# Patient Record
Sex: FEMALE | Race: BLACK OR AFRICAN AMERICAN | NOT HISPANIC OR LATINO | URBAN - METROPOLITAN AREA
[De-identification: names, ages, dates, MRNs, and addresses within clinical notes are randomized per-mention and may not be internally consistent; named-entity substitution may affect disease eponyms.]

---

## 2015-03-23 RX ORDER — LEVOTHYROXINE SODIUM 125 MCG
1 TABLET ORAL
Qty: 0 | Refills: 0 | DISCHARGE
Start: 2015-03-23

## 2020-02-17 ENCOUNTER — INPATIENT (INPATIENT)
Facility: HOSPITAL | Age: 85
LOS: 8 days | Discharge: HOME | DRG: 385 | End: 2020-02-26
Attending: INTERNAL MEDICINE | Admitting: INTERNAL MEDICINE
Payer: MEDICARE

## 2020-02-17 VITALS
TEMPERATURE: 97 F | DIASTOLIC BLOOD PRESSURE: 83 MMHG | HEART RATE: 61 BPM | OXYGEN SATURATION: 96 % | HEIGHT: 60 IN | WEIGHT: 164.91 LBS | RESPIRATION RATE: 18 BRPM | SYSTOLIC BLOOD PRESSURE: 158 MMHG

## 2020-02-17 DIAGNOSIS — K92.2 GASTROINTESTINAL HEMORRHAGE, UNSPECIFIED: ICD-10-CM

## 2020-02-17 LAB
ALBUMIN SERPL ELPH-MCNC: 3.1 G/DL — LOW (ref 3.3–5)
ALP SERPL-CCNC: 70 U/L — SIGNIFICANT CHANGE UP (ref 40–120)
ALT FLD-CCNC: 11 U/L — SIGNIFICANT CHANGE UP (ref 10–45)
ANION GAP SERPL CALC-SCNC: 11 MMOL/L — SIGNIFICANT CHANGE UP (ref 5–17)
APTT BLD: 30.6 SEC — SIGNIFICANT CHANGE UP (ref 27.5–36.3)
AST SERPL-CCNC: 16 U/L — SIGNIFICANT CHANGE UP (ref 10–40)
BASOPHILS # BLD AUTO: 0.05 K/UL — SIGNIFICANT CHANGE UP (ref 0–0.2)
BASOPHILS NFR BLD AUTO: 0.6 % — SIGNIFICANT CHANGE UP (ref 0–2)
BILIRUB SERPL-MCNC: 0.2 MG/DL — SIGNIFICANT CHANGE UP (ref 0.2–1.2)
BLD GP AB SCN SERPL QL: NEGATIVE — SIGNIFICANT CHANGE UP
BUN SERPL-MCNC: 31 MG/DL — HIGH (ref 7–23)
C DIFF GDH STL QL: NEGATIVE — SIGNIFICANT CHANGE UP
C DIFF GDH STL QL: SIGNIFICANT CHANGE UP
CALCIUM SERPL-MCNC: 9 MG/DL — SIGNIFICANT CHANGE UP (ref 8.4–10.5)
CHLORIDE SERPL-SCNC: 103 MMOL/L — SIGNIFICANT CHANGE UP (ref 96–108)
CO2 SERPL-SCNC: 22 MMOL/L — SIGNIFICANT CHANGE UP (ref 22–31)
CREAT SERPL-MCNC: 1.25 MG/DL — SIGNIFICANT CHANGE UP (ref 0.5–1.3)
EOSINOPHIL # BLD AUTO: 0.08 K/UL — SIGNIFICANT CHANGE UP (ref 0–0.5)
EOSINOPHIL NFR BLD AUTO: 0.9 % — SIGNIFICANT CHANGE UP (ref 0–6)
GAS PNL BLDV: SIGNIFICANT CHANGE UP
GLUCOSE BLDC GLUCOMTR-MCNC: 159 MG/DL — HIGH (ref 70–99)
GLUCOSE BLDC GLUCOMTR-MCNC: 182 MG/DL — HIGH (ref 70–99)
GLUCOSE BLDC GLUCOMTR-MCNC: 202 MG/DL — HIGH (ref 70–99)
GLUCOSE SERPL-MCNC: 195 MG/DL — HIGH (ref 70–99)
HCT VFR BLD CALC: 21.3 % — LOW (ref 34.5–45)
HCT VFR BLD CALC: 28 % — LOW (ref 34.5–45)
HGB BLD-MCNC: 6.9 G/DL — CRITICAL LOW (ref 11.5–15.5)
HGB BLD-MCNC: 8.5 G/DL — LOW (ref 11.5–15.5)
IMM GRANULOCYTES NFR BLD AUTO: 0.5 % — SIGNIFICANT CHANGE UP (ref 0–1.5)
INR BLD: 1.01 RATIO — SIGNIFICANT CHANGE UP (ref 0.88–1.16)
LYMPHOCYTES # BLD AUTO: 1.78 K/UL — SIGNIFICANT CHANGE UP (ref 1–3.3)
LYMPHOCYTES # BLD AUTO: 20.8 % — SIGNIFICANT CHANGE UP (ref 13–44)
MCHC RBC-ENTMCNC: 25.4 PG — LOW (ref 27–34)
MCHC RBC-ENTMCNC: 26.3 PG — LOW (ref 27–34)
MCHC RBC-ENTMCNC: 30.4 GM/DL — LOW (ref 32–36)
MCHC RBC-ENTMCNC: 32.4 GM/DL — SIGNIFICANT CHANGE UP (ref 32–36)
MCV RBC AUTO: 81.3 FL — SIGNIFICANT CHANGE UP (ref 80–100)
MCV RBC AUTO: 83.8 FL — SIGNIFICANT CHANGE UP (ref 80–100)
MONOCYTES # BLD AUTO: 0.58 K/UL — SIGNIFICANT CHANGE UP (ref 0–0.9)
MONOCYTES NFR BLD AUTO: 6.8 % — SIGNIFICANT CHANGE UP (ref 2–14)
NEUTROPHILS # BLD AUTO: 6.01 K/UL — SIGNIFICANT CHANGE UP (ref 1.8–7.4)
NEUTROPHILS NFR BLD AUTO: 70.4 % — SIGNIFICANT CHANGE UP (ref 43–77)
NRBC # BLD: 0 /100 WBCS — SIGNIFICANT CHANGE UP (ref 0–0)
NRBC # BLD: 0 /100 WBCS — SIGNIFICANT CHANGE UP (ref 0–0)
PLATELET # BLD AUTO: 229 K/UL — SIGNIFICANT CHANGE UP (ref 150–400)
PLATELET # BLD AUTO: 250 K/UL — SIGNIFICANT CHANGE UP (ref 150–400)
POTASSIUM SERPL-MCNC: 4.7 MMOL/L — SIGNIFICANT CHANGE UP (ref 3.5–5.3)
POTASSIUM SERPL-SCNC: 4.7 MMOL/L — SIGNIFICANT CHANGE UP (ref 3.5–5.3)
PROT SERPL-MCNC: 6.5 G/DL — SIGNIFICANT CHANGE UP (ref 6–8.3)
PROTHROM AB SERPL-ACNC: 11.5 SEC — SIGNIFICANT CHANGE UP (ref 10–12.9)
RBC # BLD: 2.62 M/UL — LOW (ref 3.8–5.2)
RBC # BLD: 3.34 M/UL — LOW (ref 3.8–5.2)
RBC # FLD: 15.4 % — HIGH (ref 10.3–14.5)
RBC # FLD: 15.5 % — HIGH (ref 10.3–14.5)
RH IG SCN BLD-IMP: POSITIVE — SIGNIFICANT CHANGE UP
SODIUM SERPL-SCNC: 136 MMOL/L — SIGNIFICANT CHANGE UP (ref 135–145)
WBC # BLD: 11.27 K/UL — HIGH (ref 3.8–10.5)
WBC # BLD: 8.54 K/UL — SIGNIFICANT CHANGE UP (ref 3.8–10.5)
WBC # FLD AUTO: 11.27 K/UL — HIGH (ref 3.8–10.5)
WBC # FLD AUTO: 8.54 K/UL — SIGNIFICANT CHANGE UP (ref 3.8–10.5)

## 2020-02-17 PROCEDURE — 74177 CT ABD & PELVIS W/CONTRAST: CPT | Mod: 26

## 2020-02-17 PROCEDURE — 99285 EMERGENCY DEPT VISIT HI MDM: CPT | Mod: GC

## 2020-02-17 PROCEDURE — 93010 ELECTROCARDIOGRAM REPORT: CPT

## 2020-02-17 RX ORDER — DEXTROSE 50 % IN WATER 50 %
12.5 SYRINGE (ML) INTRAVENOUS ONCE
Refills: 0 | Status: DISCONTINUED | OUTPATIENT
Start: 2020-02-17 | End: 2020-02-26

## 2020-02-17 RX ORDER — ATORVASTATIN CALCIUM 80 MG/1
40 TABLET, FILM COATED ORAL AT BEDTIME
Refills: 0 | Status: DISCONTINUED | OUTPATIENT
Start: 2020-02-17 | End: 2020-02-26

## 2020-02-17 RX ORDER — SODIUM CHLORIDE 9 MG/ML
1000 INJECTION INTRAMUSCULAR; INTRAVENOUS; SUBCUTANEOUS
Refills: 0 | Status: DISCONTINUED | OUTPATIENT
Start: 2020-02-17 | End: 2020-02-18

## 2020-02-17 RX ORDER — QUETIAPINE FUMARATE 200 MG/1
100 TABLET, FILM COATED ORAL AT BEDTIME
Refills: 0 | Status: DISCONTINUED | OUTPATIENT
Start: 2020-02-17 | End: 2020-02-26

## 2020-02-17 RX ORDER — BUDESONIDE AND FORMOTEROL FUMARATE DIHYDRATE 160; 4.5 UG/1; UG/1
2 AEROSOL RESPIRATORY (INHALATION)
Refills: 0 | Status: DISCONTINUED | OUTPATIENT
Start: 2020-02-17 | End: 2020-02-26

## 2020-02-17 RX ORDER — DONEPEZIL HYDROCHLORIDE 10 MG/1
5 TABLET, FILM COATED ORAL AT BEDTIME
Refills: 0 | Status: DISCONTINUED | OUTPATIENT
Start: 2020-02-17 | End: 2020-02-26

## 2020-02-17 RX ORDER — ALBUTEROL 90 UG/1
2 AEROSOL, METERED ORAL EVERY 6 HOURS
Refills: 0 | Status: DISCONTINUED | OUTPATIENT
Start: 2020-02-17 | End: 2020-02-26

## 2020-02-17 RX ORDER — INSULIN LISPRO 100/ML
VIAL (ML) SUBCUTANEOUS
Refills: 0 | Status: DISCONTINUED | OUTPATIENT
Start: 2020-02-17 | End: 2020-02-26

## 2020-02-17 RX ORDER — PANTOPRAZOLE SODIUM 20 MG/1
40 TABLET, DELAYED RELEASE ORAL DAILY
Refills: 0 | Status: DISCONTINUED | OUTPATIENT
Start: 2020-02-17 | End: 2020-02-23

## 2020-02-17 RX ORDER — FUROSEMIDE 40 MG
0 TABLET ORAL
Qty: 0 | Refills: 0 | DISCHARGE

## 2020-02-17 RX ORDER — SODIUM CHLORIDE 9 MG/ML
1000 INJECTION, SOLUTION INTRAVENOUS
Refills: 0 | Status: DISCONTINUED | OUTPATIENT
Start: 2020-02-17 | End: 2020-02-19

## 2020-02-17 RX ORDER — GLUCAGON INJECTION, SOLUTION 0.5 MG/.1ML
1 INJECTION, SOLUTION SUBCUTANEOUS ONCE
Refills: 0 | Status: DISCONTINUED | OUTPATIENT
Start: 2020-02-17 | End: 2020-02-26

## 2020-02-17 RX ORDER — DEXTROSE 50 % IN WATER 50 %
25 SYRINGE (ML) INTRAVENOUS ONCE
Refills: 0 | Status: DISCONTINUED | OUTPATIENT
Start: 2020-02-17 | End: 2020-02-26

## 2020-02-17 RX ORDER — LEVOTHYROXINE SODIUM 125 MCG
125 TABLET ORAL DAILY
Refills: 0 | Status: DISCONTINUED | OUTPATIENT
Start: 2020-02-17 | End: 2020-02-26

## 2020-02-17 RX ORDER — DEXTROSE 50 % IN WATER 50 %
15 SYRINGE (ML) INTRAVENOUS ONCE
Refills: 0 | Status: DISCONTINUED | OUTPATIENT
Start: 2020-02-17 | End: 2020-02-26

## 2020-02-17 RX ORDER — PANTOPRAZOLE SODIUM 20 MG/1
40 TABLET, DELAYED RELEASE ORAL ONCE
Refills: 0 | Status: COMPLETED | OUTPATIENT
Start: 2020-02-17 | End: 2020-02-17

## 2020-02-17 RX ORDER — INSULIN LISPRO 100/ML
VIAL (ML) SUBCUTANEOUS AT BEDTIME
Refills: 0 | Status: DISCONTINUED | OUTPATIENT
Start: 2020-02-17 | End: 2020-02-26

## 2020-02-17 RX ADMIN — QUETIAPINE FUMARATE 100 MILLIGRAM(S): 200 TABLET, FILM COATED ORAL at 23:38

## 2020-02-17 RX ADMIN — DONEPEZIL HYDROCHLORIDE 5 MILLIGRAM(S): 10 TABLET, FILM COATED ORAL at 23:38

## 2020-02-17 RX ADMIN — Medication 1: at 13:17

## 2020-02-17 RX ADMIN — Medication 2: at 17:47

## 2020-02-17 RX ADMIN — ATORVASTATIN CALCIUM 40 MILLIGRAM(S): 80 TABLET, FILM COATED ORAL at 23:38

## 2020-02-17 RX ADMIN — PANTOPRAZOLE SODIUM 40 MILLIGRAM(S): 20 TABLET, DELAYED RELEASE ORAL at 23:39

## 2020-02-17 RX ADMIN — PANTOPRAZOLE SODIUM 40 MILLIGRAM(S): 20 TABLET, DELAYED RELEASE ORAL at 10:00

## 2020-02-17 NOTE — CONSULT NOTE ADULT - SUBJECTIVE AND OBJECTIVE BOX
Patient is a 87y old  Female who presents with a chief complaint of     HPI: Ms. Alejandra is an 86 yo lady with PMH of  breast CA, HTN, COPD, T2DM, diastolic CHF, hypothyroidism, p/w 1 episode of BRBPR; daughter notes she was helping patient clean up around 2h PTA and noted blood with clots on toilet paper and a small amount in the toilet bowl. She had 2 more episodes while she was in ER with blood mixed with stool. She now is having more of clots coming out. Patient is unable to provide history due to her baseline dementia No fever or chills were reported. No abdominal pain.    Last Colonoscopy: 2015 The splenic flexure, cecum, at the appendiceal orifice and ileocecal valve are normal. - Findings suggestive of ischemic colitis at the hepatic flexure. Biopsied.   Clip was placed.- Diverticulosis in the sigmoid colon, in the descending colon, in the transverse colon and in the ascending colon.    Last Endoscopy: 2015  Normal esophagus.  - Hiatus hernia.- Gastric mucosal atrophy.- Normal duodenal bulb. - Non-bleeding ampullary polypoid mass.- Normal 2nd part of the duodenum. Nephrology consulted for CKD stage 3.           PAST MEDICAL & SURGICAL HISTORY:  Chronic Diastolic Heart Failure  Chronic Obstructive Pulmonary Disease (COPD)  Depression  Dementia  Diabetes Mellitus Type II  History of Hypothyroidism  Hypertension  S/P total B/LKnee Replacement      MEDICATIONS  (STANDING):  dextrose 5%. 1000 milliLiter(s) (50 mL/Hr) IV Continuous <Continuous>  dextrose 50% Injectable 12.5 Gram(s) IV Push once  dextrose 50% Injectable 25 Gram(s) IV Push once  dextrose 50% Injectable 25 Gram(s) IV Push once  insulin lispro (HumaLOG) corrective regimen sliding scale   SubCutaneous three times a day before meals  insulin lispro (HumaLOG) corrective regimen sliding scale   SubCutaneous at bedtime      Allergies  Vasotec (Unknown)    SOCIAL HISTORY:  Denies alcohol or tobacco abuse.    FAMILY HISTORY:  No pertinent family history in first degree relatives  No kidney disease in family.    REVIEW OF SYSTEMS:  Unable to obtain as she is debted.     VITAL:  T(C): , Max: 36.8 (02-17-20 @ 08:30)  T(F): , Max: 98.3 (02-17-20 @ 08:30)  HR: 76 (02-17-20 @ 20:02)  BP: 147/86 (02-17-20 @ 20:02)  RR: 20 (02-17-20 @ 20:02)  SpO2: 96% (02-17-20 @ 20:02)      PHYSICAL EXAM:  General: NAD, Alert, Pleasant  HEENT: NCAT, PERRLA  Neck: Supple, No JVD  Respiratory: CTA-b/l  Cardiovascular: RRR s1s2, no m/r/g  Gastrointestinal: +BS, soft, NT/ND  Extremities: No peripheral edema b/l  Neurological: no focal deficits; strength grossly intact  Psychiatric: Normal mood, normal affect  Back: no CVAT b/l  Skin: No rashes, no nevi      02-17    136  |  103  |  31<H>  ----------------------------<  195<H>  4.7   |  22  |  1.25    Ca    9.0      17 Feb 2020 08:44    TPro  6.5  /  Alb  3.1<L>  /  TBili  0.2  /  DBili  x   /  AST  16  /  ALT  11  /  AlkPhos  70  02-17  LABS:                        6.9    11.27 )-----------( 229      ( 17 Feb 2020 21:10 )             21.3     Na(136)/K(4.7)/Cl(103)/HCO3(22)/BUN(31)/Cr(1.25)Glu(195)/Ca(9.0)/Mg(--)/PO4(--)    02-17 @ 08:44      IMAGING:  EXAM:  CT ABDOMEN AND PELVIS IC                            PROCEDURE DATE:  02/17/2020            INTERPRETATION:  CLINICAL INFORMATION: Left lower quadrant abdominal pain.     COMPARISON: CT abdomen and pelvis 6/11/2015.    PROCEDURE:   CT of the Abdomen and Pelvis was performed with intravenous contrast.   Intravenous contrast: 90 ml Omnipaque 350. 10 ml discarded.  Oral contrast: None.  Sagittal and coronal reformats were performed.    FINDINGS:    LOWER CHEST: Cardiomegaly.    LIVER: Within normal limits.  BILE DUCTS: Normal caliber.  GALLBLADDER: Within normal limits.  SPLEEN: Within normal limits.  PANCREAS: Within normal limits.  ADRENALS: Within normal limits.  KIDNEYS/URETERS: Bilateral subcentimeter hypodense foci, too small to characterize. No hydronephrosis.    BLADDER: Diffuse wall thickening.  REPRODUCTIVE ORGANS: Uterus and adnexa within normal limits.    BOWEL: Small hiatal hernia. No bowel obstruction. Appendix is not visualized. Colonic diverticulosis without diverticulitis. Mild colonic wall thickening involving the proximal sigmoid and descending colon.  PERITONEUM: No drainable fluid collection.  VESSELS: Atherosclerotic changes.  RETROPERITONEUM/LYMPH NODES: No lymphadenopathy.    ABDOMINAL WALL: Small fat-containing umbilical hernia.  BONES: Unchanged sclerotic focus in the L1 vertebral body. Degenerative changes.    IMPRESSION:     Longer segment wall thickening involving the proximal sigmoid and descending colon favoring colitis.    Extensive diverticular disease.    Diffuse bladder wall thickening; correlate with urinalysis for underlying cystitis.

## 2020-02-17 NOTE — CONSULT NOTE ADULT - SUBJECTIVE AND OBJECTIVE BOX
Chief Complaint:  bright red blood per rectum     HPI:  86 yo hx of breast CA, HTN, COPD, T2DM, diastolic CHF, hypothyroidism, p/w 1 episode of BRBPR; daughter notes she was helping patient clean up around 2h PTA and noted blood with clots on toilet paper and a small amount in the toilet bowl. She had 2 more episodes while she was in ER with blood mixed with stool.   Patient is unable to provide history due to her baseline dementia   No fever or chills were reported. No abdominal pain.    Last Colonoscopy: 2015 The splenic flexure, cecum, at the appendiceal orifice and ileocecal valve are normal. - Findings suggestive of ischemic colitis at the hepatic flexure. Biopsied.   Clip was placed.- Diverticulosis in the sigmoid colon, in the descending colon, in the transverse colon and in the ascending colon.    Last Endoscopy: 2015  Normal esophagus.  - Hiatus hernia.- Gastric mucosal atrophy.- Normal duodenal bulb. - Non-bleeding ampullary polypoid mass.- Normal 2nd part of the duodenum.    Allergies:  Vasotec (Unknown)    Home Medications:   * Patient Currently Takes Medications as of 11-Jul-2015 15:38 documented in Structured Notes  · 	metoprolol tartrate 25 mg oral tablet: 1 tab(s) orally 2 times a day  · 	pantoprazole 40 mg oral delayed release tablet: 1 tab(s) orally 2 times a day (before meals)  · 	hydrALAZINE 50 mg oral tablet: 1 tab(s) orally 3 times a day  · 	albuterol-ipratropium 2.5 mg-0.5 mg/3 mL inhalation solution: 3 milliliter(s) inhaled every 6 hours  · 	levothyroxine 125 mcg (0.125 mg) oral tablet: 1 tab(s) orally once a day  · 	fluticasone-salmeterol 250 mcg-50 mcg inhalation powder: 1 puff(s) inhaled 2 times a day  · 	metFORMIN 500 mg oral tablet: 1 tab(s) orally 2 times a day  · 	simvastatin 80 mg oral tablet: 1 tab(s) orally once a day (at bedtime)  · 	donepezil 5 mg oral tablet: 1 tab(s) orally once a day (at bedtime)    Hospital Medications:  dextrose 40% Gel 15 Gram(s) Oral once PRN  dextrose 5%. 1000 milliLiter(s) IV Continuous <Continuous>  dextrose 50% Injectable 12.5 Gram(s) IV Push once  dextrose 50% Injectable 25 Gram(s) IV Push once  dextrose 50% Injectable 25 Gram(s) IV Push once  glucagon  Injectable 1 milliGRAM(s) IntraMuscular once PRN  insulin lispro (HumaLOG) corrective regimen sliding scale   SubCutaneous three times a day before meals  insulin lispro (HumaLOG) corrective regimen sliding scale   SubCutaneous at bedtime      PMHX/PSHX:  Chronic Diastolic Heart Failure  Chronic Obstructive Pulmonary Disease (COPD)  Depression  Dementia  Diabetes Mellitus Type II  History of Hypothyroidism  Hypertension  S/P total B/LKnee Replacement      Family history:  unable to be obtained due to her baseline dementia       Social History: no smoking    ROS:  unable to be obtained due to her baseline dementia       PHYSICAL EXAM:   GENERAL:  NAD, Appears stated age  HEENT:  NC/AT,  conjunctivae clear and pink, sclera -anicteric  CHEST:  CTA B/L, Normal effort  HEART:  RRR S1/S2, No murmurs  ABDOMEN:  Soft, non-tender, non-distended, normoactive bowel sounds,  no masses   EXTREMITIES:  Edema  SKIN:  Warm & Dry. No rash or erythema  NEURO:  Alert, oriented x 1     Vital Signs:  Vital Signs Last 24 Hrs  T(C): 36.7 (17 Feb 2020 20:02), Max: 36.8 (17 Feb 2020 08:30)  T(F): 98 (17 Feb 2020 20:02), Max: 98.3 (17 Feb 2020 08:30)  HR: 76 (17 Feb 2020 20:02) (61 - 76)  BP: 147/86 (17 Feb 2020 20:02) (121/59 - 158/83)  BP(mean): --  RR: 20 (17 Feb 2020 20:02) (16 - 21)  SpO2: 96% (17 Feb 2020 20:02) (96% - 100%)  Daily Height in cm: 152.4 (17 Feb 2020 20:02)    Daily     LABS:                        8.5    8.54  )-----------( 250      ( 17 Feb 2020 08:44 )             28.0     Mean Cell Volume: 83.8 fl (02-17-20 @ 08:44)    02-17    136  |  103  |  31<H>  ----------------------------<  195<H>  4.7   |  22  |  1.25    Ca    9.0      17 Feb 2020 08:44    TPro  6.5  /  Alb  3.1<L>  /  TBili  0.2  /  DBili  x   /  AST  16  /  ALT  11  /  AlkPhos  70  02-17    LIVER FUNCTIONS - ( 17 Feb 2020 08:44 )  Alb: 3.1 g/dL / Pro: 6.5 g/dL / ALK PHOS: 70 U/L / ALT: 11 U/L / AST: 16 U/L / GGT: x           PT/INR - ( 17 Feb 2020 08:44 )   PT: 11.5 sec;   INR: 1.01 ratio         PTT - ( 17 Feb 2020 08:44 )  PTT:30.6 sec                            8.5    8.54  )-----------( 250      ( 17 Feb 2020 08:44 )             28.0     Imaging:  < from: CT Abdomen and Pelvis w/ IV Cont (02.17.20 @ 10:16) >  EXAM:  CT ABDOMEN AND PELVIS IC                            PROCEDURE DATE:  02/17/2020            INTERPRETATION:  CLINICAL INFORMATION: Left lower quadrant abdominal pain.     COMPARISON: CT abdomen and pelvis 6/11/2015.    PROCEDURE:   CT of the Abdomen and Pelvis was performed with intravenous contrast.   Intravenous contrast: 90 ml Omnipaque 350. 10 ml discarded.  Oral contrast: None.  Sagittal and coronal reformats were performed.    FINDINGS:    LOWER CHEST: Cardiomegaly.    LIVER: Withinnormal limits.  BILE DUCTS: Normal caliber.  GALLBLADDER: Within normal limits.  SPLEEN: Within normal limits.  PANCREAS: Within normal limits.  ADRENALS: Within normal limits.  KIDNEYS/URETERS: Bilateral subcentimeter hypodense foci, too small to characterize. No hydronephrosis.    BLADDER: Diffuse wall thickening.  REPRODUCTIVE ORGANS: Uterus and adnexa within normal limits.    BOWEL: Small hiatal hernia. No bowel obstruction. Appendix is not visualized. Colonic diverticulosis without diverticulitis. Mild colonic wall thickening involving the proximal sigmoid and descending colon.  PERITONEUM: No drainable fluid collection.  VESSELS: Atherosclerotic changes.  RETROPERITONEUM/LYMPH NODES: No lymphadenopathy.    ABDOMINAL WALL: Small fat-containing umbilical hernia.  BONES: Unchanged sclerotic focus in the L1 vertebral body. Degenerative changes.    IMPRESSION:     Longer segment wall thickening involving the proximal sigmoid and descending colon favoring colitis.    Extensive diverticular disease.    Diffuse bladder wall thickening; correlate with urinalysis for underlying cystitis.          < end of copied text >

## 2020-02-17 NOTE — ED PROVIDER NOTE - PROGRESS NOTE DETAILS
Spoke w/ Dr Huerta's office, he is not her GI only saw her once in the past. Spoke w/ [facility] will which see pt while admitted

## 2020-02-17 NOTE — ED PROVIDER NOTE - CLINICAL SUMMARY MEDICAL DECISION MAKING FREE TEXT BOX
Pt p/w painless rectal bleeding, not on AC, in NAD on exam. Likely LGIB from hemorrhoid vs diverticular bleeding vs external anal pathology such as tear or fissure. Plan: Labs, guaiac, dispo pending w/u

## 2020-02-17 NOTE — H&P ADULT - HISTORY OF PRESENT ILLNESS
88 yo hx of breast CA, dementia,  HTN, COPD, T2DM, diastolic CHF, hypothyroidism, developed BRBPR this am, daughter notes she was helping patient clean up around 6 am and noted blood with clots on toilet paper and a small amount in the toilet bowl. Daughter denies pt reporting CP, SOB, or dizziness. Ptn has baseline dementia, appears confortable and denies any discomfort. pale appearing, Ptn had LGI bleed 5 years ago but daughter unclear about the results.

## 2020-02-17 NOTE — H&P ADULT - NSICDXPASTMEDICALHX_GEN_ALL_CORE_FT
PAST MEDICAL HISTORY:  Chronic Diastolic Heart Failure     Chronic Obstructive Pulmonary Disease (COPD)     Dementia     Depression     Diabetes Mellitus Type II     History of Hypothyroidism     Hypertension

## 2020-02-17 NOTE — ED ADULT NURSE NOTE - OBJECTIVE STATEMENT
86 y/o F pt, w/ pmh of COPD, CHF, DM, dementia, depression, HTN, hypothyroidism, present to ED for bloody stool, as per daughter who is at bedside, pt had 1 episode of bright red blood with clots, on exam A&Ox2, breathing spontaneous, unlabored w/o distress on room air, mild abd pain to umbilical area, abd soft, NT, ND, + BS in all 4 quadrant, neg CVA tenderness, denies N/V/D, fever, chills, dysuria, hematuria, seen and eval by MD, heplock placed, labs drawn and sent, placed on CM NSR, clot was found in diaper, butt clean and dry with pigmentation changes to buttock, awaits CT scan

## 2020-02-17 NOTE — CONSULT NOTE ADULT - ASSESSMENT
ASSESSMENT:   Ms. Alejandra is an 86 yo lady with PMH of  breast CA, HTN, COPD, T2DM, diastolic CHF, hypothyroidism, presented with 1 episode of BRBPR; daughter notes she was helping patient clean up around 2h PTA and noted blood with clots on toilet paper and a small amount in the toilet bowl. She had 2 more episodes while she was in ER with blood mixed with stool. She now is having more of clots coming out. Patient is unable to provide history due to her baseline dementia No fever or chills were reported. No abdominal pain. Nephrology consulted for CKD stage 3.     1. CKD stage 3. Her Scr is 1.25 mg/dl.    2. Electrolytes are acceptable.   3. Intravascularly depleted.   4. Acute blood loss anemia.   5. Leukocytosis.   6. Diffuse wall thickening with possible cystitis.     RECOMMEND:  - UA, urine Na, urine creatinine, urine     Thank you for involving [employer] in this patient's care.    With warm regards,    Erika Valencia, DO  [email or web address]  [phone] ASSESSMENT:   Ms. Alejandra is an 88 yo lady with PMH of  breast CA, HTN, COPD, T2DM, diastolic CHF, hypothyroidism, presented with 1 episode of BRBPR; daughter notes she was helping patient clean up around 2h PTA and noted blood with clots on toilet paper and a small amount in the toilet bowl. She had 2 more episodes while she was in ER with blood mixed with stool. She now is having more of clots coming out. Patient is unable to provide history due to her baseline dementia No fever or chills were reported. No abdominal pain. Nephrology consulted for CKD stage 3.     1. CKD stage 3. Her Scr is 1.25 mg/dl.    2. Electrolytes are acceptable.   3. Intravascularly depleted.   4. Acute blood loss anemia.   5. Leukocytosis.   6. Diffuse wall thickening with possible cystitis.     RECOMMEND:  - UA, urine Na, urine creatinine, urine protein.   - No need for renal US.   - Urine culture.   - Avoid NSAIDs and nephrotoxins.   - Renal dose all medication for GFR <35 to 45 ml/min.     Thank you for involving [employer] in this patient's care.    With warm regards,    Erika Valencia, DO  [employer]  [phone] ASSESSMENT:   Ms. Alejandra is an 86 yo lady with PMH of  breast CA, HTN, COPD, T2DM, diastolic CHF, hypothyroidism, presented with 1 episode of BRBPR; daughter notes she was helping patient clean up around 2h PTA and noted blood with clots on toilet paper and a small amount in the toilet bowl. She had 2 more episodes while she was in ER with blood mixed with stool. She now is having more of clots coming out. Patient is unable to provide history due to her baseline dementia No fever or chills were reported. No abdominal pain. Nephrology consulted for CKD stage 3.     1. CKD stage 3. Her Scr is 1.25 mg/dl.    2. Electrolytes are acceptable.   3. Intravascularly depleted.   4. Acute blood loss anemia.   5. Leukocytosis.   6. Diffuse wall thickening with possible cystitis.     RECOMMEND:  - UA, urine Na, urine creatinine, urine protein.   - No need for renal US.   - Urine culture.   - IVF of NS at 50 cc/hr for 20 hours.   - Avoid NSAIDs and nephrotoxins.   - Renal dose all medication for GFR <35 to 45 ml/min.     Thank you for involving [employer] in this patient's care.    With warm regards,    Erika Valencia, DO  [employer]  [phone]

## 2020-02-17 NOTE — ED PROVIDER NOTE - OBJECTIVE STATEMENT
88 yo hx of breast CA, HTN, COPD, T2DM, diastolaic CHF, hypothyroidism, p/w 1 episode of BRBPR; daughter notes she was helping patient clean up around 2h PTA and noted blood with clots on toilet paper and a small amount in the toilet bowl. Daughter denies pt reporting CP, SoB, or light headedness, or being on blood thinners. Per daughter at bedside pt had similar bleeding 5 years ago and had a colonoscopy; is unsure of the results at that time and is unsure whether pt has a GI doctor.

## 2020-02-17 NOTE — ED ADULT NURSE REASSESSMENT NOTE - NS ED NURSE REASSESS COMMENT FT1
pt had 1 episode of bright red blood per rectum with multiple clots, pt is A&Ox3, breathing spontaneous, unlabored w/o distress on room air, CM NSR, denies dizziness, light headed or weakness, abd soft, ND, NT, MD Lata notified, 2 large bore IV placed, b/p 131/10, hr 74, will continue to monitor pt

## 2020-02-17 NOTE — H&P ADULT - ASSESSMENT
88 yo woman presents w large volume maroon bloody BMs w large amount of clots, HH is 8.5/28, need to trend every 6 hrs, transfuse w 2 Units PRBS if drops below 7, get CRS and GI consult, Ptn w tic and questionable colitis on Ct A/P. GI consult pending. will follow recommendations, check stool PCR/ get ID consult, get colonoscopy results from 2015.  keep on clears  IVF  hold all outptn antiHTN meds, hold outptn diuretic  transfuse to HH 10/30  check cbc q 6H  consult CRS  DVT ppx w PAS

## 2020-02-17 NOTE — CONSULT NOTE ADULT - ASSESSMENT
Impression:  # Hematochezia with long segment wall thickening involving the proximal sigmoid and descending colon favoring colitis and extensive diverticular disease.    DDx: diverticular bleed, ischemic or infectious colitis   # Dementia   # hx of breast CA  # diastolic CHF    Recommendations:  - Send stool for GI PCR, C. DIFF  - Clear liquid diet for now  - Will discuss with family regarding flex. Sigmoidoscopy   - NPO after midnight.  - Continue to monitor h&h and transfuse if hgb drop below 7

## 2020-02-17 NOTE — ED PROVIDER NOTE - ATTENDING CONTRIBUTION TO CARE
Attending MD Guido:  I personally have seen and examined this patient.  Resident note reviewed and agree on plan of care and except where noted.  See HPI, PE, and MDM for details.      86 yo hx of breast CA, HTN, COPD, T2DM, diastolaic CHF, hypothyroidism, p/w 1 episode of BRBPR, exam here with stable hemodynamics but rectal with clots and bright red blood, also with mild LLQ ttp so will obtain CT a/p to ro diverticulitis or colitis. Plan for labs, PIV access, T&S, admission

## 2020-02-17 NOTE — H&P ADULT - NSHPPHYSICALEXAM_GEN_ALL_CORE
T(F): 98 (02-17-20 @ 15:02), Max: 98.3 (02-17-20 @ 08:30)  HR: 76 (02-17-20 @ 15:02) (61 - 76)  BP: 147/86 (02-17-20 @ 15:02) (121/59 - 158/83)  RR: 20 (02-17-20 @ 15:02) (16 - 21)  SpO2: 96% (02-17-20 @ 15:02) (96% - 100%)    GENERAL: NAD, well-developed  HEAD:  Atraumatic, Normocephalic  EYES: EOMI, PERRLA, conjunctiva and sclera clear  NECK: Supple, No JVD  CHEST/LUNG: Clear to auscultation bilaterally; No wheeze  HEART: Regular rate and rhythm; No murmurs, rubs, or gallops  ABDOMEN: Soft, Nontender, Nondistended; Bowel sounds present  EXTREMITIES:  2+ Peripheral Pulses, No clubbing, cyanosis, or edema  PSYCH: AAOx3  NEUROLOGY: non-focal  SKIN: No rashes or lesions T(F): 98 (02-17-20 @ 15:02), Max: 98.3 (02-17-20 @ 08:30)  HR: 76 (02-17-20 @ 15:02) (61 - 76)  BP: 147/86 (02-17-20 @ 15:02) (121/59 - 158/83)  RR: 20 (02-17-20 @ 15:02) (16 - 21)  SpO2: 96% (02-17-20 @ 15:02) (96% - 100%)    GENERAL: NAD, well-developed  HEAD:  Atraumatic, Normocephalic  EYES: EOMI, PERRLA, conjunctiva pale and sclera clear  NECK: Supple, No JVD  CHEST/LUNG: Clear to auscultation bilaterally; No wheeze  HEART: Regular rate and rhythm; No murmurs, rubs, or gallops  ABDOMEN: Soft, Nontender, Nondistended; Bowel sounds present  EXTREMITIES:  2+ Peripheral Pulses, No clubbing, cyanosis, or edema  PSYCH: AAOx3  NEUROLOGY: non-focal  SKIN: No rashes or lesions

## 2020-02-17 NOTE — ED PROVIDER NOTE - PHYSICAL EXAMINATION
General: Alert. No apparent distress.  Head: Normocephalic and atraumatic.  Eyes: PERRLA with EOMI.  Neck: Supple. Trachea midline.   Cardiac: Normal S1 and S2 w/ RRR. No murmurs appreciated.   Pulmonary: Vesicular breath sounds bilaterally. No increased WOB. No wheezes or crackles.  Abdominal: Soft, non-tender.  Neurologic: No focal sensory or motor deficits.  Musculoskeletal: Strength appropriate in all 4 extremities for age with no limited ROM.  Skin: Color appropriate for race. Intact, warm, and well-perfused. General: Alert. No apparent distress.  Head: Normocephalic and atraumatic.  Eyes: PERRLA with EOMI.  Neck: Supple. Trachea midline.   Cardiac: Normal S1 and S2 w/ RRR. No murmurs appreciated.   Pulmonary: Vesicular breath sounds bilaterally. No increased WOB. No wheezes or crackles.  Abdominal: Soft, non-tender. Gross blood and dark stool in the vault. Clemenet Chavez RN  Neurologic: No focal sensory or motor deficits.  Musculoskeletal: Strength appropriate in all 4 extremities for age with no limited ROM.  Skin: Color appropriate for race. Intact, warm, and well-perfused. General: Alert. No apparent distress.  Head: Normocephalic and atraumatic.  Eyes: PERRLA with EOMI.  Neck: Supple. Trachea midline.   Cardiac: Normal S1 and S2 w/ RRR. No murmurs appreciated.   Pulmonary: Vesicular breath sounds bilaterally. No increased WOB. No wheezes or crackles.  Abdominal: Soft, mild LLQ TTP. Gross blood and dark stool in the vault. Clemente Chavez RN  Neurologic: No focal sensory or motor deficits.  Musculoskeletal: Strength appropriate in all 4 extremities for age with no limited ROM.  Skin: Color appropriate for race. Intact, warm, and well-perfused.

## 2020-02-17 NOTE — ED ADULT NURSE NOTE - NSIMPLEMENTINTERV_GEN_ALL_ED
Implemented All Fall Risk Interventions:  [place] to call system. Call bell, personal items and telephone within reach. Instruct patient to call for assistance. Room bathroom lighting operational. Non-slip footwear when patient is off stretcher. Physically safe environment: no spills, clutter or unnecessary equipment. Stretcher in lowest position, wheels locked, appropriate side rails in place. Provide visual cue, wrist band, yellow gown, etc. Monitor gait and stability. Monitor for mental status changes and reorient to person, place, and time. Review medications for side effects contributing to fall risk. Reinforce activity limits and safety measures with patient and family.

## 2020-02-18 LAB
ANION GAP SERPL CALC-SCNC: 12 MMOL/L — SIGNIFICANT CHANGE UP (ref 5–17)
BUN SERPL-MCNC: 33 MG/DL — HIGH (ref 7–23)
CALCIUM SERPL-MCNC: 8.8 MG/DL — SIGNIFICANT CHANGE UP (ref 8.4–10.5)
CHLORIDE SERPL-SCNC: 104 MMOL/L — SIGNIFICANT CHANGE UP (ref 96–108)
CO2 SERPL-SCNC: 22 MMOL/L — SIGNIFICANT CHANGE UP (ref 22–31)
CREAT ?TM UR-MCNC: 48 MG/DL — SIGNIFICANT CHANGE UP
CREAT SERPL-MCNC: 1.48 MG/DL — HIGH (ref 0.5–1.3)
CULTURE RESULTS: SIGNIFICANT CHANGE UP
GLUCOSE BLDC GLUCOMTR-MCNC: 110 MG/DL — HIGH (ref 70–99)
GLUCOSE BLDC GLUCOMTR-MCNC: 123 MG/DL — HIGH (ref 70–99)
GLUCOSE BLDC GLUCOMTR-MCNC: 178 MG/DL — HIGH (ref 70–99)
GLUCOSE BLDC GLUCOMTR-MCNC: 202 MG/DL — HIGH (ref 70–99)
GLUCOSE SERPL-MCNC: 160 MG/DL — HIGH (ref 70–99)
HBA1C BLD-MCNC: 7.2 % — HIGH (ref 4–5.6)
HCT VFR BLD CALC: 25.4 % — LOW (ref 34.5–45)
HGB BLD-MCNC: 7.8 G/DL — LOW (ref 11.5–15.5)
MAGNESIUM SERPL-MCNC: 1.9 MG/DL — SIGNIFICANT CHANGE UP (ref 1.6–2.6)
MCHC RBC-ENTMCNC: 26.6 PG — LOW (ref 27–34)
MCHC RBC-ENTMCNC: 30.7 GM/DL — LOW (ref 32–36)
MCV RBC AUTO: 86.7 FL — SIGNIFICANT CHANGE UP (ref 80–100)
NRBC # BLD: 0 /100 WBCS — SIGNIFICANT CHANGE UP (ref 0–0)
PHOSPHATE SERPL-MCNC: 3.4 MG/DL — SIGNIFICANT CHANGE UP (ref 2.5–4.5)
PLATELET # BLD AUTO: 136 K/UL — LOW (ref 150–400)
POTASSIUM SERPL-MCNC: 4.2 MMOL/L — SIGNIFICANT CHANGE UP (ref 3.5–5.3)
POTASSIUM SERPL-SCNC: 4.2 MMOL/L — SIGNIFICANT CHANGE UP (ref 3.5–5.3)
PROT ?TM UR-MCNC: 32 MG/DL — HIGH (ref 0–12)
PROT/CREAT UR-RTO: 0.7 RATIO — HIGH (ref 0–0.2)
RBC # BLD: 2.93 M/UL — LOW (ref 3.8–5.2)
RBC # FLD: 15.5 % — HIGH (ref 10.3–14.5)
SODIUM SERPL-SCNC: 138 MMOL/L — SIGNIFICANT CHANGE UP (ref 135–145)
SODIUM UR-SCNC: 49 MMOL/L — SIGNIFICANT CHANGE UP
SPECIMEN SOURCE: SIGNIFICANT CHANGE UP
WBC # BLD: 10.38 K/UL — SIGNIFICANT CHANGE UP (ref 3.8–10.5)
WBC # FLD AUTO: 10.38 K/UL — SIGNIFICANT CHANGE UP (ref 3.8–10.5)

## 2020-02-18 PROCEDURE — 99222 1ST HOSP IP/OBS MODERATE 55: CPT

## 2020-02-18 RX ORDER — SODIUM CHLORIDE 9 MG/ML
1000 INJECTION INTRAMUSCULAR; INTRAVENOUS; SUBCUTANEOUS
Refills: 0 | Status: DISCONTINUED | OUTPATIENT
Start: 2020-02-18 | End: 2020-02-19

## 2020-02-18 RX ADMIN — DONEPEZIL HYDROCHLORIDE 5 MILLIGRAM(S): 10 TABLET, FILM COATED ORAL at 21:52

## 2020-02-18 RX ADMIN — PANTOPRAZOLE SODIUM 40 MILLIGRAM(S): 20 TABLET, DELAYED RELEASE ORAL at 12:54

## 2020-02-18 RX ADMIN — SODIUM CHLORIDE 50 MILLILITER(S): 9 INJECTION INTRAMUSCULAR; INTRAVENOUS; SUBCUTANEOUS at 00:00

## 2020-02-18 RX ADMIN — Medication 30 MILLIGRAM(S): at 12:54

## 2020-02-18 RX ADMIN — ATORVASTATIN CALCIUM 40 MILLIGRAM(S): 80 TABLET, FILM COATED ORAL at 21:52

## 2020-02-18 RX ADMIN — BUDESONIDE AND FORMOTEROL FUMARATE DIHYDRATE 2 PUFF(S): 160; 4.5 AEROSOL RESPIRATORY (INHALATION) at 06:04

## 2020-02-18 RX ADMIN — Medication 2: at 08:50

## 2020-02-18 RX ADMIN — QUETIAPINE FUMARATE 100 MILLIGRAM(S): 200 TABLET, FILM COATED ORAL at 21:52

## 2020-02-18 RX ADMIN — Medication 125 MICROGRAM(S): at 06:04

## 2020-02-18 RX ADMIN — SODIUM CHLORIDE 50 MILLILITER(S): 9 INJECTION INTRAMUSCULAR; INTRAVENOUS; SUBCUTANEOUS at 17:31

## 2020-02-18 RX ADMIN — BUDESONIDE AND FORMOTEROL FUMARATE DIHYDRATE 2 PUFF(S): 160; 4.5 AEROSOL RESPIRATORY (INHALATION) at 17:31

## 2020-02-18 NOTE — CONSULT NOTE ADULT - ASSESSMENT
GIB  fu with GI   likely diverticulosis   Monitor hemoglobin, transfuse as needed.     HTN  stable for now  if persistently elevated , we can resume slowly her outpt meds    Diastolic CHF   chronic   stable   diuretics on hold for now given active GIB    CKD  pt follow up with renal   stable

## 2020-02-18 NOTE — CONSULT NOTE ADULT - SUBJECTIVE AND OBJECTIVE BOX
CHIEF COMPLAINT:Patient is a 87y old  Female who presents with a chief complaint of GI bleed. (17 Feb 2020 19:27)      HISTORY OF PRESENT ILLNESS:    87 female with history as below, known to me from office with history of  breast CA, HTN, COPD, T2DM, diastolic CHF, hypothyroidism, p/w  GIB   bright red blood per rectum   no cp or sob  pt is mildly lethargic on exam today     PAST MEDICAL & SURGICAL HISTORY:  Chronic Diastolic Heart Failure  Chronic Obstructive Pulmonary Disease (COPD)  Depression  Dementia  Diabetes Mellitus Type II  History of Hypothyroidism  Hypertension  S/P total B/LKnee Replacement          MEDICATIONS:      ALBUTerol    90 MICROgram(s) HFA Inhaler 2 Puff(s) Inhalation every 6 hours PRN  budesonide 160 MICROgram(s)/formoterol 4.5 MICROgram(s) Inhaler 2 Puff(s) Inhalation two times a day    donepezil 5 milliGRAM(s) Oral at bedtime  PARoxetine 30 milliGRAM(s) Oral daily  QUEtiapine 100 milliGRAM(s) Oral at bedtime    pantoprazole  Injectable 40 milliGRAM(s) IV Push daily    atorvastatin 40 milliGRAM(s) Oral at bedtime  dextrose 40% Gel 15 Gram(s) Oral once PRN  dextrose 50% Injectable 12.5 Gram(s) IV Push once  dextrose 50% Injectable 25 Gram(s) IV Push once  dextrose 50% Injectable 25 Gram(s) IV Push once  glucagon  Injectable 1 milliGRAM(s) IntraMuscular once PRN  insulin lispro (HumaLOG) corrective regimen sliding scale   SubCutaneous three times a day before meals  insulin lispro (HumaLOG) corrective regimen sliding scale   SubCutaneous at bedtime  levothyroxine 125 MICROGram(s) Oral daily    dextrose 5%. 1000 milliLiter(s) IV Continuous <Continuous>  sodium chloride 0.9%. 1000 milliLiter(s) IV Continuous <Continuous>      FAMILY HISTORY:  No pertinent family history in first degree relatives      Non-contributory    SOCIAL HISTORY:    No tobacco, drugs or etoh    Allergies    Vasotec (Unknown)    Intolerances    	    REVIEW OF SYSTEMS:  as above  The rest of the 14 points ROS reviewed and except above they are unremarkable.        PHYSICAL EXAM:  T(C): 36.9 (02-18-20 @ 04:57), Max: 36.9 (02-18-20 @ 04:57)  HR: 73 (02-18-20 @ 04:57) (62 - 76)  BP: 158/51 (02-18-20 @ 04:57) (121/59 - 158/75)  RR: 18 (02-18-20 @ 04:57) (16 - 21)  SpO2: 99% (02-18-20 @ 04:57) (96% - 100%)  Wt(kg): --  I&O's Summary      JVP: Normal  Neck: supple  Lung: clear   CV: S1 S2 , Murmur:  Abd: soft  Ext: No edema  neuro: Awake / alert  Psych: flat affect  Skin: normal    LABS/DATA:    TELEMETRY: 	    ECG:  	< from: 12 Lead ECG (02.17.20 @ 09:23) >    Ventricular Rate 63 BPM    Atrial Rate 63 BPM    P-R Interval 210 ms    QRS Duration 80 ms    Q-T Interval 422 ms    QTC Calculation(Bezet) 431 ms    P Axis 87 degrees    R Axis -1 degrees    T Axis 148 degrees    Diagnosis Line SINUS RHYTHM WITH 1ST DEGREE A-V BLOCK  T WAVE ABNORMALITY, CONSIDER LATERAL ISCHEMIA  ABNORMAL ECG    < end of copied text >     	  CARDIAC MARKERS:                                      6.9    11.27 )-----------( 229      ( 17 Feb 2020 21:10 )             21.3     02-17    136  |  103  |  31<H>  ----------------------------<  195<H>  4.7   |  22  |  1.25    Ca    9.0      17 Feb 2020 08:44    TPro  6.5  /  Alb  3.1<L>  /  TBili  0.2  /  DBili  x   /  AST  16  /  ALT  11  /  AlkPhos  70  02-17    proBNP:   Lipid Profile:   HgA1c:   TSH:

## 2020-02-18 NOTE — PROGRESS NOTE ADULT - ASSESSMENT
ASSESSMENT/PLAN  ASSESSMENT:   Ms. Alejandra is an 86 yo lady with PMH of  breast CA, HTN, COPD, T2DM, diastolic CHF, hypothyroidism, presented with 1 episode of BRBPR; daughter notes she was helping patient clean up around 2h PTA and noted blood with clots on toilet paper and a small amount in the toilet bowl. She had 2 more episodes while she was in ER with blood mixed with stool. She now is having more of clots coming out. Patient is unable to provide history due to her baseline dementia No fever or chills were reported. No abdominal pain. Nephrology consulted for CKD stage 3.     1. CKD stage 3. Her Scr increased to 1.48 mg/dl.   2. Electrolytes are acceptable.   3. Intravascularly depleted.   4. Acute blood loss anemia.   5. Leukocytosis.   6. Diffuse wall thickening with possible cystitis.     RECOMMEND:  - UA, urine Na, urine creatinine, urine protein.   - No need for renal US.   - Urine culture.   - IVF of NS at 50 cc/hr for 30 hours.   - Avoid NSAIDs and nephrotoxins.   - Renal dose all medication for GFR <35 to 45 ml/min.     Thank you for involving [employer] in this patient's care.    With warm regards,      Erika Valencia, DO  [employer]  [phone]

## 2020-02-18 NOTE — PROGRESS NOTE ADULT - SUBJECTIVE AND OBJECTIVE BOX
No pain, no shortness of breath. Pleasantly confused. Did not have a bloody bowel movement. Daughter Jessica was at bedside and wanted only Dr. Allred's opinion. He stated she needs no more colonoscopies. She was seen by Cristino Huerta in 2015. Currently is being seen by house.     Review of systems: All 10 points ROS was obtained except as above.     ALBUTerol    90 MICROgram(s) HFA Inhaler 2 Puff(s) Inhalation every 6 hours PRN  atorvastatin 40 milliGRAM(s) Oral at bedtime  budesonide 160 MICROgram(s)/formoterol 4.5 MICROgram(s) Inhaler 2 Puff(s) Inhalation two times a day  dextrose 40% Gel 15 Gram(s) Oral once PRN  dextrose 5%. 1000 milliLiter(s) IV Continuous <Continuous>  dextrose 50% Injectable 12.5 Gram(s) IV Push once  dextrose 50% Injectable 25 Gram(s) IV Push once  dextrose 50% Injectable 25 Gram(s) IV Push once  donepezil 5 milliGRAM(s) Oral at bedtime  glucagon  Injectable 1 milliGRAM(s) IntraMuscular once PRN  insulin lispro (HumaLOG) corrective regimen sliding scale   SubCutaneous three times a day before meals  insulin lispro (HumaLOG) corrective regimen sliding scale   SubCutaneous at bedtime  levothyroxine 125 MICROGram(s) Oral daily  pantoprazole  Injectable 40 milliGRAM(s) IV Push daily  PARoxetine 30 milliGRAM(s) Oral daily  QUEtiapine 100 milliGRAM(s) Oral at bedtime  sodium chloride 0.9%. 1000 milliLiter(s) IV Continuous <Continuous>      VITAL:  T(C): , Max: 36.9 (02-18-20 @ 04:57)  T(F): , Max: 98.5 (02-18-20 @ 13:57)  HR: 74 (02-18-20 @ 13:57)  BP: 145/73 (02-18-20 @ 13:57)  BP(mean): --  RR: 18 (02-18-20 @ 13:57)  SpO2: 98% (02-18-20 @ 13:57)  Wt(kg): --    02-18-20 @ 07:01  -  02-18-20 @ 15:55  --------------------------------------------------------  IN: 0 mL / OUT: 0 mL / NET: 0 mL        PHYSICAL EXAM:  General: NAD; Alert  HEENT:  NCAT; PERRLA  Neck: No JVD; supple  Respiratory: CTA-b/l  Cardiac: RRR s1s2  Gastrointestinal: BS+, soft, NT/ND  Urologic: No kim  Extremities: No peripheral edema      LABS:                          7.8    10.38 )-----------( 136      ( 18 Feb 2020 10:59 )             25.4     Na(138)/K(4.2)/Cl(104)/HCO3(22)/BUN(33)/Cr(1.48)Glu(160)/Ca(8.8)/Mg(1.9)/PO4(3.4)    02-18 @ 10:59  Na(136)/K(4.7)/Cl(103)/HCO3(22)/BUN(31)/Cr(1.25)Glu(195)/Ca(9.0)/Mg(--)/PO4(--)    02-17 @ 08:44    02-18    138  |  104  |  33<H>  ----------------------------<  160<H>  4.2   |  22  |  1.48<H>    Ca    8.8      18 Feb 2020 10:59  Phos  3.4     02-18  Mg     1.9     02-18    TPro  6.5  /  Alb  3.1<L>  /  TBili  0.2  /  DBili  x   /  AST  16  /  ALT  11  /  AlkPhos  70  02-17

## 2020-02-18 NOTE — PROGRESS NOTE ADULT - ASSESSMENT
86 yo woman presents w large volume maroon bloody BMs w large amount of clots, HH was 8.5/28 on admission, rpt was 6, receievd 2 Units PRBC, mainteained a stable BP, though was orthostatic, today Hgb 7.8, rpt in 12 hrs pending, NPO after midnihgt as per GI for am flec/sig,  check stool PCR  get ID consult,  keep on clears  IVF  hold all outptn antiHTN meds, hold outptn diuretic  transfuse to HH 10/30  check cbc q 12H  DVT ppx w PAS

## 2020-02-18 NOTE — CHART NOTE - NSCHARTNOTEFT_GEN_A_CORE
Patient is 88 y/o F with pmhx breast CA, HTN, COPD presenting with BRBPR.   Pt is NPO after midnight for possible flex sigmoidoscopy or colonoscopy tomorrow. Discussed with GI fellow - decision to proceed with procedure will be based on morning Hgb and conversation with attending GI physician.   Spoke to Dr. Fish over the phone, who is covering for patient's PCP/nephrologist Dr. Allred. Per Dr. Fish, patient has had multiple colonoscopies in the past and would prefer a conservative approach to her anemia/GI work up. If decision is made to have procedure tomorrow, please contact Dr. Fish and discuss with patient and family prior.     Endorsed to night NP.     Irma Ocasio PA-C  Department of Medicine

## 2020-02-18 NOTE — CHART NOTE - NSCHARTNOTEFT_GEN_A_CORE
SAI MENSAH    Notified by RN patient with Hgb 6.9, no c/o cp or sob.      Interventions taken   Blood transfusion consent obtained by pt's daughter via phone  PRBC 1 unit transfusion  repeat H/H 1hr after blood   cont assess and monitor  NPO after MN for possible scopy by GI  f/u with am team.                         Archana Arambula ANP-BC  Spectralink #[id number]

## 2020-02-18 NOTE — CONSULT NOTE ADULT - ATTENDING COMMENTS
Thank you for allowing me to participate in the care of this patient.
88 yo F pmh breast CA, DM, dementia, COPD/CHF who presents with BRBPR consisting of stool, blood, and some clots. 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 ischemia).  No episodes since then.  Now presenting with similar symptoms, hgb to 6. 9.  However CT scan showing distribution on left side this time.  Given drop in hgb, may benefit from flex sig vs. colon, however patient and family hesitant.  Okay to transfuse prbc, supportive care, clear liquid diet at this time.  Pending repeat cbc, we can consider endoscopic evaluation.  Agree with infectious workup via stool studies if persistently loose stools

## 2020-02-19 LAB
ANION GAP SERPL CALC-SCNC: 13 MMOL/L — SIGNIFICANT CHANGE UP (ref 5–17)
APPEARANCE UR: ABNORMAL
APPEARANCE UR: ABNORMAL
BACTERIA # UR AUTO: ABNORMAL
BILIRUB UR-MCNC: NEGATIVE — SIGNIFICANT CHANGE UP
BILIRUB UR-MCNC: NEGATIVE — SIGNIFICANT CHANGE UP
BUN SERPL-MCNC: 30 MG/DL — HIGH (ref 7–23)
CALCIUM SERPL-MCNC: 8.9 MG/DL — SIGNIFICANT CHANGE UP (ref 8.4–10.5)
CHLORIDE SERPL-SCNC: 105 MMOL/L — SIGNIFICANT CHANGE UP (ref 96–108)
CO2 SERPL-SCNC: 22 MMOL/L — SIGNIFICANT CHANGE UP (ref 22–31)
COLOR SPEC: SIGNIFICANT CHANGE UP
COLOR SPEC: SIGNIFICANT CHANGE UP
COMMENT - URINE: SIGNIFICANT CHANGE UP
CREAT SERPL-MCNC: 1.65 MG/DL — HIGH (ref 0.5–1.3)
DIFF PNL FLD: ABNORMAL
DIFF PNL FLD: NEGATIVE — SIGNIFICANT CHANGE UP
EPI CELLS # UR: 5 /HPF — SIGNIFICANT CHANGE UP (ref 0–5)
GLUCOSE BLDC GLUCOMTR-MCNC: 128 MG/DL — HIGH (ref 70–99)
GLUCOSE BLDC GLUCOMTR-MCNC: 128 MG/DL — HIGH (ref 70–99)
GLUCOSE BLDC GLUCOMTR-MCNC: 164 MG/DL — HIGH (ref 70–99)
GLUCOSE BLDC GLUCOMTR-MCNC: 194 MG/DL — HIGH (ref 70–99)
GLUCOSE SERPL-MCNC: 169 MG/DL — HIGH (ref 70–99)
GLUCOSE UR QL: ABNORMAL
GLUCOSE UR QL: NEGATIVE — SIGNIFICANT CHANGE UP
HCT VFR BLD CALC: 20.5 % — CRITICAL LOW (ref 34.5–45)
HCT VFR BLD CALC: 22.8 % — LOW (ref 34.5–45)
HCT VFR BLD CALC: 28.4 % — LOW (ref 34.5–45)
HCT VFR BLD CALC: 30.7 % — LOW (ref 34.5–45)
HGB BLD-MCNC: 6.3 G/DL — CRITICAL LOW (ref 11.5–15.5)
HGB BLD-MCNC: 7.1 G/DL — LOW (ref 11.5–15.5)
HGB BLD-MCNC: 8.9 G/DL — LOW (ref 11.5–15.5)
HGB BLD-MCNC: 9.6 G/DL — LOW (ref 11.5–15.5)
HYALINE CASTS # UR AUTO: 0 /LPF — SIGNIFICANT CHANGE UP (ref 0–7)
KETONES UR-MCNC: NEGATIVE — SIGNIFICANT CHANGE UP
KETONES UR-MCNC: NEGATIVE — SIGNIFICANT CHANGE UP
LEUKOCYTE ESTERASE UR-ACNC: ABNORMAL
LEUKOCYTE ESTERASE UR-ACNC: ABNORMAL
MAGNESIUM SERPL-MCNC: 2 MG/DL — SIGNIFICANT CHANGE UP (ref 1.6–2.6)
MCHC RBC-ENTMCNC: 25.9 PG — LOW (ref 27–34)
MCHC RBC-ENTMCNC: 26.1 PG — LOW (ref 27–34)
MCHC RBC-ENTMCNC: 26.2 PG — LOW (ref 27–34)
MCHC RBC-ENTMCNC: 26.6 PG — LOW (ref 27–34)
MCHC RBC-ENTMCNC: 30.7 GM/DL — LOW (ref 32–36)
MCHC RBC-ENTMCNC: 31.1 GM/DL — LOW (ref 32–36)
MCHC RBC-ENTMCNC: 31.3 GM/DL — LOW (ref 32–36)
MCHC RBC-ENTMCNC: 31.3 GM/DL — LOW (ref 32–36)
MCV RBC AUTO: 83.2 FL — SIGNIFICANT CHANGE UP (ref 80–100)
MCV RBC AUTO: 83.3 FL — SIGNIFICANT CHANGE UP (ref 80–100)
MCV RBC AUTO: 83.9 FL — SIGNIFICANT CHANGE UP (ref 80–100)
MCV RBC AUTO: 86.5 FL — SIGNIFICANT CHANGE UP (ref 80–100)
NITRITE UR-MCNC: NEGATIVE — SIGNIFICANT CHANGE UP
NITRITE UR-MCNC: NEGATIVE — SIGNIFICANT CHANGE UP
NRBC # BLD: 0 /100 WBCS — SIGNIFICANT CHANGE UP (ref 0–0)
PH UR: 8.5 — HIGH (ref 5–8)
PH UR: 8.5 — HIGH (ref 5–8)
PHOSPHATE SERPL-MCNC: 4.2 MG/DL — SIGNIFICANT CHANGE UP (ref 2.5–4.5)
PLATELET # BLD AUTO: 193 K/UL — SIGNIFICANT CHANGE UP (ref 150–400)
PLATELET # BLD AUTO: 199 K/UL — SIGNIFICANT CHANGE UP (ref 150–400)
PLATELET # BLD AUTO: 224 K/UL — SIGNIFICANT CHANGE UP (ref 150–400)
PLATELET # BLD AUTO: 62 K/UL — LOW (ref 150–400)
POTASSIUM SERPL-MCNC: 4.8 MMOL/L — SIGNIFICANT CHANGE UP (ref 3.5–5.3)
POTASSIUM SERPL-SCNC: 4.8 MMOL/L — SIGNIFICANT CHANGE UP (ref 3.5–5.3)
PROT UR-MCNC: ABNORMAL
PROT UR-MCNC: ABNORMAL
RBC # BLD: 2.37 M/UL — LOW (ref 3.8–5.2)
RBC # BLD: 2.74 M/UL — LOW (ref 3.8–5.2)
RBC # BLD: 3.41 M/UL — LOW (ref 3.8–5.2)
RBC # BLD: 3.66 M/UL — LOW (ref 3.8–5.2)
RBC # FLD: 15.4 % — HIGH (ref 10.3–14.5)
RBC # FLD: 15.5 % — HIGH (ref 10.3–14.5)
RBC # FLD: 15.5 % — HIGH (ref 10.3–14.5)
RBC # FLD: 15.6 % — HIGH (ref 10.3–14.5)
RBC CASTS # UR COMP ASSIST: 10 /HPF — HIGH (ref 0–4)
SODIUM SERPL-SCNC: 140 MMOL/L — SIGNIFICANT CHANGE UP (ref 135–145)
SP GR SPEC: 1.01 — SIGNIFICANT CHANGE UP (ref 1.01–1.02)
SP GR SPEC: 1.01 — SIGNIFICANT CHANGE UP (ref 1.01–1.02)
T4 FREE SERPL-MCNC: 1.2 NG/DL — SIGNIFICANT CHANGE UP (ref 0.9–1.8)
TRI-PHOS CRY UR QL COMP ASSIST: ABNORMAL
TSH SERPL-MCNC: 1.48 UIU/ML — SIGNIFICANT CHANGE UP (ref 0.27–4.2)
UROBILINOGEN FLD QL: NEGATIVE — SIGNIFICANT CHANGE UP
UROBILINOGEN FLD QL: SIGNIFICANT CHANGE UP
WBC # BLD: 5.01 K/UL — SIGNIFICANT CHANGE UP (ref 3.8–10.5)
WBC # BLD: 9.25 K/UL — SIGNIFICANT CHANGE UP (ref 3.8–10.5)
WBC # BLD: 9.57 K/UL — SIGNIFICANT CHANGE UP (ref 3.8–10.5)
WBC # BLD: 9.71 K/UL — SIGNIFICANT CHANGE UP (ref 3.8–10.5)
WBC # FLD AUTO: 5.01 K/UL — SIGNIFICANT CHANGE UP (ref 3.8–10.5)
WBC # FLD AUTO: 9.25 K/UL — SIGNIFICANT CHANGE UP (ref 3.8–10.5)
WBC # FLD AUTO: 9.57 K/UL — SIGNIFICANT CHANGE UP (ref 3.8–10.5)
WBC # FLD AUTO: 9.71 K/UL — SIGNIFICANT CHANGE UP (ref 3.8–10.5)
WBC UR QL: 111 /HPF — HIGH (ref 0–5)

## 2020-02-19 PROCEDURE — 71045 X-RAY EXAM CHEST 1 VIEW: CPT | Mod: 26

## 2020-02-19 PROCEDURE — 71045 X-RAY EXAM CHEST 1 VIEW: CPT | Mod: 26,77

## 2020-02-19 PROCEDURE — 99232 SBSQ HOSP IP/OBS MODERATE 35: CPT

## 2020-02-19 RX ORDER — PANTOPRAZOLE SODIUM 20 MG/1
40 TABLET, DELAYED RELEASE ORAL ONCE
Refills: 0 | Status: COMPLETED | OUTPATIENT
Start: 2020-02-19 | End: 2020-02-19

## 2020-02-19 RX ORDER — CEFTRIAXONE 500 MG/1
INJECTION, POWDER, FOR SOLUTION INTRAMUSCULAR; INTRAVENOUS
Refills: 0 | Status: DISCONTINUED | OUTPATIENT
Start: 2020-02-19 | End: 2020-02-23

## 2020-02-19 RX ORDER — CEFTRIAXONE 500 MG/1
1000 INJECTION, POWDER, FOR SOLUTION INTRAMUSCULAR; INTRAVENOUS ONCE
Refills: 0 | Status: COMPLETED | OUTPATIENT
Start: 2020-02-19 | End: 2020-02-19

## 2020-02-19 RX ORDER — METOPROLOL TARTRATE 50 MG
50 TABLET ORAL ONCE
Refills: 0 | Status: COMPLETED | OUTPATIENT
Start: 2020-02-19 | End: 2020-02-19

## 2020-02-19 RX ORDER — AMLODIPINE BESYLATE 2.5 MG/1
2.5 TABLET ORAL ONCE
Refills: 0 | Status: DISCONTINUED | OUTPATIENT
Start: 2020-02-19 | End: 2020-02-19

## 2020-02-19 RX ORDER — CEFTRIAXONE 500 MG/1
1000 INJECTION, POWDER, FOR SOLUTION INTRAMUSCULAR; INTRAVENOUS EVERY 24 HOURS
Refills: 0 | Status: DISCONTINUED | OUTPATIENT
Start: 2020-02-20 | End: 2020-02-23

## 2020-02-19 RX ORDER — METOPROLOL TARTRATE 50 MG
50 TABLET ORAL DAILY
Refills: 0 | Status: DISCONTINUED | OUTPATIENT
Start: 2020-02-20 | End: 2020-02-23

## 2020-02-19 RX ADMIN — Medication 30 MILLIGRAM(S): at 17:56

## 2020-02-19 RX ADMIN — Medication 1: at 08:44

## 2020-02-19 RX ADMIN — Medication 125 MICROGRAM(S): at 05:40

## 2020-02-19 RX ADMIN — ATORVASTATIN CALCIUM 40 MILLIGRAM(S): 80 TABLET, FILM COATED ORAL at 21:35

## 2020-02-19 RX ADMIN — BUDESONIDE AND FORMOTEROL FUMARATE DIHYDRATE 2 PUFF(S): 160; 4.5 AEROSOL RESPIRATORY (INHALATION) at 17:56

## 2020-02-19 RX ADMIN — CEFTRIAXONE 100 MILLIGRAM(S): 500 INJECTION, POWDER, FOR SOLUTION INTRAMUSCULAR; INTRAVENOUS at 20:14

## 2020-02-19 RX ADMIN — DONEPEZIL HYDROCHLORIDE 5 MILLIGRAM(S): 10 TABLET, FILM COATED ORAL at 21:35

## 2020-02-19 RX ADMIN — Medication 50 MILLIGRAM(S): at 17:56

## 2020-02-19 RX ADMIN — BUDESONIDE AND FORMOTEROL FUMARATE DIHYDRATE 2 PUFF(S): 160; 4.5 AEROSOL RESPIRATORY (INHALATION) at 05:40

## 2020-02-19 RX ADMIN — PANTOPRAZOLE SODIUM 40 MILLIGRAM(S): 20 TABLET, DELAYED RELEASE ORAL at 13:11

## 2020-02-19 RX ADMIN — QUETIAPINE FUMARATE 100 MILLIGRAM(S): 200 TABLET, FILM COATED ORAL at 21:35

## 2020-02-19 RX ADMIN — PANTOPRAZOLE SODIUM 40 MILLIGRAM(S): 20 TABLET, DELAYED RELEASE ORAL at 01:25

## 2020-02-19 NOTE — DIETITIAN INITIAL EVALUATION ADULT. - OTHER INFO
Visited pt at bedside with daughter present. Pt observed to be sleeping and reported with AMS and dementia per pt's daughter. Interview differed to daughter. She reports pt having a good appetite both PTA and in-house; consuming >75% of most meals. Pt did not follow a specific diet at home, but daughter states that she monitors her carbohydrate intake. Denies any known food allergies or intolerances. She reports pt needs total assist with feeding and ADLs. Daughter denies any chewing/swallowing difficulty, nausea/vomiting, constipation, or diarrhea. She denies any micronutrient supplementation at home. Pt denies any significant weight changes.    Education: Educated family on diet advancement. Daughter made aware RD remains available as needed and will follow up as indicated/requested by patient. Family also educated on importance of adequate protein intake to promote wound healing. Family amenable to Julian 2x daily when diet is advanced.

## 2020-02-19 NOTE — PROVIDER CONTACT NOTE (CRITICAL VALUE NOTIFICATION) - ACTION/TREATMENT ORDERED:
NP made aware, to discuss POC with GI, may write for 1u transfusion. Will continue to monitor for pt safety.

## 2020-02-19 NOTE — DIETITIAN INITIAL EVALUATION ADULT. - PERTINENT MEDS FT
MEDICATIONS  (STANDING):  atorvastatin 40 milliGRAM(s) Oral at bedtime  budesonide 160 MICROgram(s)/formoterol 4.5 MICROgram(s) Inhaler 2 Puff(s) Inhalation two times a day  dextrose 5%. 1000 milliLiter(s) (50 mL/Hr) IV Continuous <Continuous>  dextrose 50% Injectable 12.5 Gram(s) IV Push once  dextrose 50% Injectable 25 Gram(s) IV Push once  dextrose 50% Injectable 25 Gram(s) IV Push once  donepezil 5 milliGRAM(s) Oral at bedtime  insulin lispro (HumaLOG) corrective regimen sliding scale   SubCutaneous three times a day before meals  insulin lispro (HumaLOG) corrective regimen sliding scale   SubCutaneous at bedtime  levothyroxine 125 MICROGram(s) Oral daily  pantoprazole  Injectable 40 milliGRAM(s) IV Push daily  PARoxetine 30 milliGRAM(s) Oral daily  QUEtiapine 100 milliGRAM(s) Oral at bedtime  sodium chloride 0.9%. 1000 milliLiter(s) (50 mL/Hr) IV Continuous <Continuous>    MEDICATIONS  (PRN):  ALBUTerol    90 MICROgram(s) HFA Inhaler 2 Puff(s) Inhalation every 6 hours PRN Bronchospasm  dextrose 40% Gel 15 Gram(s) Oral once PRN Blood Glucose LESS THAN 70 milliGRAM(s)/deciliter  glucagon  Injectable 1 milliGRAM(s) IntraMuscular once PRN Glucose LESS THAN 70 milligrams/deciliter

## 2020-02-19 NOTE — PROGRESS NOTE ADULT - SUBJECTIVE AND OBJECTIVE BOX
Chief Complaint:  Patient is a 87y old  Female who presents with a chief complaint of LGI bleed (2020 08:33)      Interval Events:   Nursing reporting small amount of rectal oozing    Allergies:  Vasotec (Unknown)      Home Medications:    Hospital Medications:  ALBUTerol    90 MICROgram(s) HFA Inhaler 2 Puff(s) Inhalation every 6 hours PRN  atorvastatin 40 milliGRAM(s) Oral at bedtime  budesonide 160 MICROgram(s)/formoterol 4.5 MICROgram(s) Inhaler 2 Puff(s) Inhalation two times a day  dextrose 40% Gel 15 Gram(s) Oral once PRN  dextrose 5%. 1000 milliLiter(s) IV Continuous <Continuous>  dextrose 50% Injectable 12.5 Gram(s) IV Push once  dextrose 50% Injectable 25 Gram(s) IV Push once  dextrose 50% Injectable 25 Gram(s) IV Push once  donepezil 5 milliGRAM(s) Oral at bedtime  glucagon  Injectable 1 milliGRAM(s) IntraMuscular once PRN  insulin lispro (HumaLOG) corrective regimen sliding scale   SubCutaneous three times a day before meals  insulin lispro (HumaLOG) corrective regimen sliding scale   SubCutaneous at bedtime  levothyroxine 125 MICROGram(s) Oral daily  pantoprazole  Injectable 40 milliGRAM(s) IV Push daily  PARoxetine 30 milliGRAM(s) Oral daily  QUEtiapine 100 milliGRAM(s) Oral at bedtime  sodium chloride 0.9%. 1000 milliLiter(s) IV Continuous <Continuous>      PMHX/PSHX:  Chronic Diastolic Heart Failure  Chronic Obstructive Pulmonary Disease (COPD)  Depression  Dementia  Diabetes Mellitus Type II  History of Hypothyroidism  Hypertension  S/P total B/LKnee Replacement      Family history:  No pertinent family history in first degree relatives      ROS:     General:  No wt loss, fevers, chills, night sweats, fatigue,   Eyes:  Good vision, no reported pain  ENT:  No sore throat, pain, runny nose, dysphagia  CV:  No pain, palpitations, hypo/hypertension  Resp:  No dyspnea, cough, tachypnea, wheezing  GI:  No pain, No nausea, No vomiting, No diarrhea, No constipation, No weight loss, No fever, No pruritis, No rectal bleeding, No tarry stools, No dysphagia,  :  No pain, bleeding, incontinence, nocturia  Muscle:  No pain, weakness  Neuro:  No weakness, tingling, memory problems  Psych:  No fatigue, insomnia, mood problems, depression  Endocrine:  No polyuria, polydipsia, cold/heat intolerance  Heme:  No petechiae, ecchymosis, easy bruisability  Skin:  No rash, tattoos, scars, edema      PHYSICAL EXAM:   Vital Signs:  Vital Signs Last 24 Hrs  T(C): 36.6 (2020 08:36), Max: 37.3 (2020 19:58)  T(F): 97.8 (2020 08:36), Max: 99.1 (2020 19:58)  HR: 73 (2020 08:36) (73 - 90)  BP: 147/67 (2020 08:36) (108/62 - 172/91)  BP(mean): --  RR: 18 (2020 08:36) (18 - 18)  SpO2: 97% (2020 08:36) (93% - 98%)  Daily     Daily     GENERAL:  NAD, Appears stated age  HEENT:  NC/AT,  conjunctivae clear and pink, sclera -anicteric  CHEST:  CTA B/L, Normal effort  HEART:  RRR S1/S2, No murmurs  ABDOMEN:  Soft, non-tender, non-distended, normoactive bowel sounds,  no masses   EXTREMITIES:  Edema  SKIN:  Warm & Dry. No rash or erythema  NEURO:  Alert, oriented x 1   LABS:                        6.3    5.01  )-----------( 62       ( 2020 07:02 )             20.5     02-19    140  |  105  |  30<H>  ----------------------------<  169<H>  4.8   |  22  |  1.65<H>    Ca    8.9      2020 07:02  Phos  4.2     -  Mg     2.0     02-19          Urinalysis Basic - ( 2020 02:10 )    Color: Light Yellow / Appearance: Turbid / S.012 / pH: x  Gluc: x / Ketone: Negative  / Bili: Negative / Urobili: <2 mg/dL   Blood: x / Protein: 100 mg/dL / Nitrite: Negative   Leuk Esterase: Large / RBC: 10 /HPF /  /HPF   Sq Epi: x / Non Sq Epi: 5 /HPF / Bacteria: Many          Imaging:

## 2020-02-19 NOTE — CONSULT NOTE ADULT - SUBJECTIVE AND OBJECTIVE BOX
Patient is a 87y old  Female who presents with a chief complaint of LGI bleed (2020 22:21)      HPI:  86 yo hx of breast CA, dementia,  HTN, COPD, T2DM, diastolic CHF, hypothyroidism, developed BRBPR this am, daughter notes she was helping patient clean up around 6 am and noted blood with clots on toilet paper and a small amount in the toilet bowl. Daughter denies pt reporting CP, SOB, or dizziness. Ptn has baseline dementia, appears confortable and denies any discomfort. pale appearing, Ptn had LGI bleed 5 years ago but daughter unclear about the results. (2020 15:54)      REVIEW OF SYSTEMS:    CONSTITUTIONAL: No fever, weight loss, or fatigue  EYES: No eye pain, visual disturbances, or discharge  ENMT:  No sore throat  NECK: No pain or stiffness  RESPIRATORY: No cough, wheezing, chills or hemoptysis; No shortness of breath  CARDIOVASCULAR: No chest pain, palpitations, dizziness, or leg swelling  GASTROINTESTINAL: No abdominal or epigastric pain. No nausea, vomiting, or hematemesis; No diarrhea or constipation. No melena or hematochezia.  GENITOURINARY: No dysuria, frequency, hematuria, or incontinence  NEUROLOGICAL: No headaches, memory loss, loss of strength, numbness, or tremors  SKIN: No itching, burning, rashes, or lesions   LYMPH NODES: No enlarged glands  MUSCULOSKELETAL: No joint pain or swelling; No muscle, back, or extremity pain      PAST MEDICAL & SURGICAL HISTORY:  Chronic Diastolic Heart Failure  Chronic Obstructive Pulmonary Disease (COPD)  Depression  Dementia  Diabetes Mellitus Type II  History of Hypothyroidism  Hypertension  S/P total B/LKnee Replacement      Allergies    Vasotec (Unknown)    Intolerances        FAMILY HISTORY:  No pertinent family history in first degree relatives      SOCIAL HISTORY:        MEDICATIONS  (STANDING):  atorvastatin 40 milliGRAM(s) Oral at bedtime  budesonide 160 MICROgram(s)/formoterol 4.5 MICROgram(s) Inhaler 2 Puff(s) Inhalation two times a day  dextrose 5%. 1000 milliLiter(s) (50 mL/Hr) IV Continuous <Continuous>  dextrose 50% Injectable 12.5 Gram(s) IV Push once  dextrose 50% Injectable 25 Gram(s) IV Push once  dextrose 50% Injectable 25 Gram(s) IV Push once  donepezil 5 milliGRAM(s) Oral at bedtime  insulin lispro (HumaLOG) corrective regimen sliding scale   SubCutaneous three times a day before meals  insulin lispro (HumaLOG) corrective regimen sliding scale   SubCutaneous at bedtime  levothyroxine 125 MICROGram(s) Oral daily  pantoprazole  Injectable 40 milliGRAM(s) IV Push daily  PARoxetine 30 milliGRAM(s) Oral daily  QUEtiapine 100 milliGRAM(s) Oral at bedtime  sodium chloride 0.9%. 1000 milliLiter(s) (50 mL/Hr) IV Continuous <Continuous>    MEDICATIONS  (PRN):  ALBUTerol    90 MICROgram(s) HFA Inhaler 2 Puff(s) Inhalation every 6 hours PRN Bronchospasm  dextrose 40% Gel 15 Gram(s) Oral once PRN Blood Glucose LESS THAN 70 milliGRAM(s)/deciliter  glucagon  Injectable 1 milliGRAM(s) IntraMuscular once PRN Glucose LESS THAN 70 milligrams/deciliter      Vital Signs Last 24 Hrs  T(C): 36.6 (2020 04:27), Max: 37.3 (2020 19:58)  T(F): 97.9 (2020 04:27), Max: 99.1 (2020 19:58)  HR: 87 (2020 04:27) (74 - 90)  BP: 158/76 (2020 04:27) (108/62 - 172/91)  BP(mean): --  RR: 18 (2020 04:27) (18 - 18)  SpO2: 93% (2020 04:27) (93% - 98%)    PHYSICAL EXAM:    GENERAL: NAD, well-groomed  HEAD:  Atraumatic, Normocephalic  EYES: EOMI, PERRLA, conjunctiva and sclera clear  ENMT: No tonsillar erythema, exudates, or enlargement; Moist mucous membranes  NECK: Supple, No JVD  CHEST/LUNG: Clear to percussion bilaterally; No rales, rhonchi, wheezing, or rubs  HEART: Regular rate and rhythm; No murmurs, rubs, or gallops  ABDOMEN: Soft, Nontender, Nondistended; Bowel sounds present  EXTREMITIES:  2+ Peripheral Pulses, No clubbing, cyanosis, or edema  LYMPH: No lymphadenopathy noted  SKIN: No rashes or lesions    LABS:  CBC Full  -  ( 2020 07:02 )  WBC Count : 5.01 K/uL  RBC Count : 2.37 M/uL  Hemoglobin : 6.3 g/dL  Hematocrit : 20.5 %  Platelet Count - Automated : 62 K/uL  Mean Cell Volume : 86.5 fl  Mean Cell Hemoglobin : 26.6 pg  Mean Cell Hemoglobin Concentration : 30.7 gm/dL  Auto Neutrophil # : x  Auto Lymphocyte # : x  Auto Monocyte # : x  Auto Eosinophil # : x  Auto Basophil # : x  Auto Neutrophil % : x  Auto Lymphocyte % : x  Auto Monocyte % : x  Auto Eosinophil % : x  Auto Basophil % : x          140  |  105  |  30<H>  ----------------------------<  169<H>  4.8   |  22  |  1.65<H>    Ca    8.9      2020 07:02  Phos  4.2       Mg     2.0         TPro  6.5  /  Alb  3.1<L>  /  TBili  0.2  /  DBili  x   /  AST  16  /  ALT  11  /  AlkPhos  70  02-17      LIVER FUNCTIONS - ( 2020 08:44 )  Alb: 3.1 g/dL / Pro: 6.5 g/dL / ALK PHOS: 70 U/L / ALT: 11 U/L / AST: 16 U/L / GGT: x                               MICROBIOLOGY:        Urinalysis Basic - ( 2020 02:10 )    Color: Light Yellow / Appearance: Turbid / S.012 / pH: x  Gluc: x / Ketone: Negative  / Bili: Negative / Urobili: <2 mg/dL   Blood: x / Protein: 100 mg/dL / Nitrite: Negative   Leuk Esterase: Large / RBC: 10 /HPF /  /HPF   Sq Epi: x / Non Sq Epi: 5 /HPF / Bacteria: Many      GI PCR Panel, Stool (20 @ 00:35)    Culture Results:   GI PCR Results: NOT detected  *******Please Note:*******  GI panel PCR evaluates for:  Campylobacter, Plesiomonas shigelloides, Salmonella,  Vibrio, Yersinia enterocolitica, Enteroaggregative  Escherichia coli (EAEC), Enteropathogenic E.coli (EPEC),  Enterotoxigenic E. coli (ETEC) lt/st, Shiga-like  toxin-producing E. coli (STEC) stx1/stx2,  Shigella/ Enteroinvasive E. coli (EIEC), Cryptosporidium,  Cyclospora cayetanensis, Entamoeba histolytica,  Giardia lamblia, Adenovirus F 40/41, Astrovirus,  Norovirus GI/GII, Rotavirus A, Sapovirus    C. difficile GDH &amp; toxins A/B by EIA . (20 @ 22:33)    Clostridium difficile GDH Toxins A&amp;B, EIA:   Negative    Clostridium difficile GDH Interpretation: Negative for toxigenic C. Difficile.  This specimen is negative for C.  Difficile glutamate dehydrogenase (GDH) antigen and negative for C.  Difficile Toxins A & B, by EIA.  GDH is a highly sensitive screening  marker for C. Difficile that is produced in large amounts by all C.  Difficile strains, both toxigenic and nontoxigenic.  This assay has not  been validated as a test of cure.  Repeat testing during the same episode  of diarrhea is of limited value and is discouraged.  The results of this  assay should always be interpreted in conjunction with pateint's clinical  history.              RADIOLOGY:    < from: CT Abdomen and Pelvis w/ IV Cont (20 @ 10:16) >    EXAM:  CT ABDOMEN AND PELVIS IC                            PROCEDURE DATE:  2020            INTERPRETATION:  CLINICAL INFORMATION: Left lower quadrant abdominal pain.     COMPARISON: CT abdomen and pelvis 2015.    PROCEDURE:   CT of the Abdomen and Pelvis was performed with intravenous contrast.   Intravenous contrast: 90 ml Omnipaque 350. 10 ml discarded.  Oral contrast: None.  Sagittal and coronal reformats were performed.    FINDINGS:    LOWER CHEST: Cardiomegaly.    LIVER: Withinnormal limits.  BILE DUCTS: Normal caliber.  GALLBLADDER: Within normal limits.  SPLEEN: Within normal limits.  PANCREAS: Within normal limits.  ADRENALS: Within normal limits.  KIDNEYS/URETERS: Bilateral subcentimeter hypodense foci, too small to characterize. No hydronephrosis.    BLADDER: Diffuse wall thickening.  REPRODUCTIVE ORGANS: Uterus and adnexa within normal limits.    BOWEL: Small hiatal hernia. No bowel obstruction. Appendix is not visualized. Colonic diverticulosis without diverticulitis. Mild colonic wall thickening involving the proximal sigmoid and descending colon.  PERITONEUM: No drainable fluid collection.  VESSELS: Atherosclerotic changes.  RETROPERITONEUM/LYMPH NODES: No lymphadenopathy.    ABDOMINAL WALL: Small fat-containing umbilical hernia.  BONES: Unchanged sclerotic focus in the L1 vertebral body. Degenerative changes.    IMPRESSION:     Longer segment wall thickening involving the proximal sigmoid and descending colon favoring colitis.    Extensive diverticular disease.    Diffuse bladder wall thickening; correlate with urinalysis for underlying cystitis.    < end of copied text > Patient is a 87y old  Female who presents with a chief complaint of LGI bleed (2020 22:21)      HPI:  86 yo hx of breast CA, dementia,  HTN, COPD, T2DM, diastolic CHF, hypothyroidism, developed BRBPR this am, daughter notes she was helping patient clean up around 6 am and noted blood with clots on toilet paper and a small amount in the toilet bowl. Daughter denies pt reporting CP, SOB, or dizziness. Ptn has baseline dementia, appears confortable and denies any discomfort. pale appearing, Ptn had LGI bleed 5 years ago but daughter unclear about the results. (2020 15:54)    Pt afebrile, normal WBC.  Lact 2.4.  UA large LE and pyuria.  Thus far stools studies:  Cdiff (-) and GI pcr (-).  Ct ap shows longer segment wall thickening involving the proximal sigmoid and descending colon favoring colitis, extensive diverticular disease and diffuse bladder wall thickening; correlate with urinalysis for underlying cystitis.  Pt is on rocephin.        REVIEW OF SYSTEMS:    CONSTITUTIONAL: No fever, weight loss, or fatigue  EYES: No eye pain, visual disturbances, or discharge  ENMT:  No sore throat  NECK: No pain or stiffness  RESPIRATORY: No cough, wheezing, chills or hemoptysis; No shortness of breath  CARDIOVASCULAR: No chest pain, palpitations, dizziness, or leg swelling  GASTROINTESTINAL: No abdominal or epigastric pain. No nausea, vomiting, or hematemesis; No diarrhea or constipation. No melena or hematochezia.  GENITOURINARY: No dysuria, frequency, hematuria, or incontinence  NEUROLOGICAL: No headaches, memory loss, loss of strength, numbness, or tremors  SKIN: No itching, burning, rashes, or lesions   LYMPH NODES: No enlarged glands  MUSCULOSKELETAL: No joint pain or swelling; No muscle, back, or extremity pain      PAST MEDICAL & SURGICAL HISTORY:  Chronic Diastolic Heart Failure  Chronic Obstructive Pulmonary Disease (COPD)  Depression  Dementia  Diabetes Mellitus Type II  History of Hypothyroidism  Hypertension  S/P total B/LKnee Replacement      Allergies    Vasotec (Unknown)    Intolerances        FAMILY HISTORY:  No pertinent family history in first degree relatives      SOCIAL HISTORY:    No smoking, ivdu, etoh    MEDICATIONS  (STANDING):  atorvastatin 40 milliGRAM(s) Oral at bedtime  budesonide 160 MICROgram(s)/formoterol 4.5 MICROgram(s) Inhaler 2 Puff(s) Inhalation two times a day  dextrose 5%. 1000 milliLiter(s) (50 mL/Hr) IV Continuous <Continuous>  dextrose 50% Injectable 12.5 Gram(s) IV Push once  dextrose 50% Injectable 25 Gram(s) IV Push once  dextrose 50% Injectable 25 Gram(s) IV Push once  donepezil 5 milliGRAM(s) Oral at bedtime  insulin lispro (HumaLOG) corrective regimen sliding scale   SubCutaneous three times a day before meals  insulin lispro (HumaLOG) corrective regimen sliding scale   SubCutaneous at bedtime  levothyroxine 125 MICROGram(s) Oral daily  pantoprazole  Injectable 40 milliGRAM(s) IV Push daily  PARoxetine 30 milliGRAM(s) Oral daily  QUEtiapine 100 milliGRAM(s) Oral at bedtime  sodium chloride 0.9%. 1000 milliLiter(s) (50 mL/Hr) IV Continuous <Continuous>    MEDICATIONS  (PRN):  ALBUTerol    90 MICROgram(s) HFA Inhaler 2 Puff(s) Inhalation every 6 hours PRN Bronchospasm  dextrose 40% Gel 15 Gram(s) Oral once PRN Blood Glucose LESS THAN 70 milliGRAM(s)/deciliter  glucagon  Injectable 1 milliGRAM(s) IntraMuscular once PRN Glucose LESS THAN 70 milligrams/deciliter      Vital Signs Last 24 Hrs  T(C): 36.6 (2020 04:27), Max: 37.3 (2020 19:58)  T(F): 97.9 (2020 04:27), Max: 99.1 (2020 19:58)  HR: 87 (2020 04:27) (74 - 90)  BP: 158/76 (2020 04:27) (108/62 - 172/91)  BP(mean): --  RR: 18 (2020 04:27) (18 - 18)  SpO2: 93% (2020 04:27) (93% - 98%)    PHYSICAL EXAM:    GENERAL: NAD, well-groomed  HEAD:  Atraumatic, Normocephalic  EYES: EOMI, PERRLA, conjunctiva and sclera clear  ENMT: No tonsillar erythema, exudates, or enlargement; Moist mucous membranes  NECK: Supple, No JVD  CHEST/LUNG: Clear to percussion bilaterally; No rales, rhonchi, wheezing, or rubs  HEART: Regular rate and rhythm; No murmurs, rubs, or gallops  ABDOMEN: Soft, Nontender, Nondistended; Bowel sounds present  EXTREMITIES:  2+ Peripheral Pulses, No clubbing, cyanosis, or edema  LYMPH: No lymphadenopathy noted  SKIN: No rashes or lesions    LABS:  CBC Full  -  ( 2020 07:02 )  WBC Count : 5.01 K/uL  RBC Count : 2.37 M/uL  Hemoglobin : 6.3 g/dL  Hematocrit : 20.5 %  Platelet Count - Automated : 62 K/uL  Mean Cell Volume : 86.5 fl  Mean Cell Hemoglobin : 26.6 pg  Mean Cell Hemoglobin Concentration : 30.7 gm/dL  Auto Neutrophil # : x  Auto Lymphocyte # : x  Auto Monocyte # : x  Auto Eosinophil # : x  Auto Basophil # : x  Auto Neutrophil % : x  Auto Lymphocyte % : x  Auto Monocyte % : x  Auto Eosinophil % : x  Auto Basophil % : x      -    140  |  105  |  30<H>  ----------------------------<  169<H>  4.8   |  22  |  1.65<H>    Ca    8.9      2020 07:02  Phos  4.2     -  Mg     2.0     -    TPro  6.5  /  Alb  3.1<L>  /  TBili  0.2  /  DBili  x   /  AST  16  /  ALT  11  /  AlkPhos  70  02-17      LIVER FUNCTIONS - ( 2020 08:44 )  Alb: 3.1 g/dL / Pro: 6.5 g/dL / ALK PHOS: 70 U/L / ALT: 11 U/L / AST: 16 U/L / GGT: x                               MICROBIOLOGY:        Urinalysis Basic - ( 2020 02:10 )    Color: Light Yellow / Appearance: Turbid / S.012 / pH: x  Gluc: x / Ketone: Negative  / Bili: Negative / Urobili: <2 mg/dL   Blood: x / Protein: 100 mg/dL / Nitrite: Negative   Leuk Esterase: Large / RBC: 10 /HPF /  /HPF   Sq Epi: x / Non Sq Epi: 5 /HPF / Bacteria: Many      GI PCR Panel, Stool (20 @ 00:35)    Culture Results:   GI PCR Results: NOT detected  *******Please Note:*******  GI panel PCR evaluates for:  Campylobacter, Plesiomonas shigelloides, Salmonella,  Vibrio, Yersinia enterocolitica, Enteroaggregative  Escherichia coli (EAEC), Enteropathogenic E.coli (EPEC),  Enterotoxigenic E. coli (ETEC) lt/st, Shiga-like  toxin-producing E. coli (STEC) stx1/stx2,  Shigella/ Enteroinvasive E. coli (EIEC), Cryptosporidium,  Cyclospora cayetanensis, Entamoeba histolytica,  Giardia lamblia, Adenovirus F 40/41, Astrovirus,  Norovirus GI/GII, Rotavirus A, Sapovirus    C. difficile GDH &amp; toxins A/B by EIA . (20 @ 22:33)    Clostridium difficile GDH Toxins A&amp;B, EIA:   Negative    Clostridium difficile GDH Interpretation: Negative for toxigenic C. Difficile.  This specimen is negative for C.  Difficile glutamate dehydrogenase (GDH) antigen and negative for C.  Difficile Toxins A & B, by EIA.  GDH is a highly sensitive screening  marker for C. Difficile that is produced in large amounts by all C.  Difficile strains, both toxigenic and nontoxigenic.  This assay has not  been validated as a test of cure.  Repeat testing during the same episode  of diarrhea is of limited value and is discouraged.  The results of this  assay should always be interpreted in conjunction with pateint's clinical  history.              RADIOLOGY:    < from: CT Abdomen and Pelvis w/ IV Cont (20 @ 10:16) >    EXAM:  CT ABDOMEN AND PELVIS IC                            PROCEDURE DATE:  2020            INTERPRETATION:  CLINICAL INFORMATION: Left lower quadrant abdominal pain.     COMPARISON: CT abdomen and pelvis 2015.    PROCEDURE:   CT of the Abdomen and Pelvis was performed with intravenous contrast.   Intravenous contrast: 90 ml Omnipaque 350. 10 ml discarded.  Oral contrast: None.  Sagittal and coronal reformats were performed.    FINDINGS:    LOWER CHEST: Cardiomegaly.    LIVER: Withinnormal limits.  BILE DUCTS: Normal caliber.  GALLBLADDER: Within normal limits.  SPLEEN: Within normal limits.  PANCREAS: Within normal limits.  ADRENALS: Within normal limits.  KIDNEYS/URETERS: Bilateral subcentimeter hypodense foci, too small to characterize. No hydronephrosis.    BLADDER: Diffuse wall thickening.  REPRODUCTIVE ORGANS: Uterus and adnexa within normal limits.    BOWEL: Small hiatal hernia. No bowel obstruction. Appendix is not visualized. Colonic diverticulosis without diverticulitis. Mild colonic wall thickening involving the proximal sigmoid and descending colon.  PERITONEUM: No drainable fluid collection.  VESSELS: Atherosclerotic changes.  RETROPERITONEUM/LYMPH NODES: No lymphadenopathy.    ABDOMINAL WALL: Small fat-containing umbilical hernia.  BONES: Unchanged sclerotic focus in the L1 vertebral body. Degenerative changes.    IMPRESSION:     Longer segment wall thickening involving the proximal sigmoid and descending colon favoring colitis.    Extensive diverticular disease.    Diffuse bladder wall thickening; correlate with urinalysis for underlying cystitis.    < end of copied text >

## 2020-02-19 NOTE — DIETITIAN INITIAL EVALUATION ADULT. - REASON INDICATOR FOR ASSESSMENT
Pt seen for nutrition consult.  Source: Comprehensive chart review and pt's daughter  Pertinent chart information: Patient is a 87y old  Female who presents with a chief complaint of GI bleed.

## 2020-02-19 NOTE — PROGRESS NOTE ADULT - ASSESSMENT
GIB  fu with GI   transfusion     HTN  stable for now  if persistently elevated , we can resume slowly her outpt meds    Diastolic CHF   chronic   stable   diuretics on hold for now given active GIB    CKD  pt follow up with renal   stable

## 2020-02-19 NOTE — PROGRESS NOTE ADULT - SUBJECTIVE AND OBJECTIVE BOX
Subjective: Patient seen and examined. No new events except as noted.     SUBJECTIVE/ROS:  feels ok   a bit confused   MEDICATIONS:  MEDICATIONS  (STANDING):  atorvastatin 40 milliGRAM(s) Oral at bedtime  budesonide 160 MICROgram(s)/formoterol 4.5 MICROgram(s) Inhaler 2 Puff(s) Inhalation two times a day  dextrose 5%. 1000 milliLiter(s) (50 mL/Hr) IV Continuous <Continuous>  dextrose 50% Injectable 12.5 Gram(s) IV Push once  dextrose 50% Injectable 25 Gram(s) IV Push once  dextrose 50% Injectable 25 Gram(s) IV Push once  donepezil 5 milliGRAM(s) Oral at bedtime  insulin lispro (HumaLOG) corrective regimen sliding scale   SubCutaneous three times a day before meals  insulin lispro (HumaLOG) corrective regimen sliding scale   SubCutaneous at bedtime  levothyroxine 125 MICROGram(s) Oral daily  pantoprazole  Injectable 40 milliGRAM(s) IV Push daily  PARoxetine 30 milliGRAM(s) Oral daily  QUEtiapine 100 milliGRAM(s) Oral at bedtime  sodium chloride 0.9%. 1000 milliLiter(s) (50 mL/Hr) IV Continuous <Continuous>      PHYSICAL EXAM:  T(C): 36.6 (02-19-20 @ 04:27), Max: 37.3 (02-18-20 @ 19:58)  HR: 87 (02-19-20 @ 04:27) (74 - 90)  BP: 158/76 (02-19-20 @ 04:27) (108/62 - 172/91)  RR: 18 (02-19-20 @ 04:27) (18 - 18)  SpO2: 93% (02-19-20 @ 04:27) (93% - 98%)  Wt(kg): --  I&O's Summary    18 Feb 2020 07:01  -  19 Feb 2020 07:00  --------------------------------------------------------  IN: 840 mL / OUT: 0 mL / NET: 840 mL            JVP: Normal  Neck: supple  Lung: clear   CV: S1 S2 , Murmur:  Abd: soft  Ext: No edema  neuro: Awake   Psych: flat affect  Skin: normal``    LABS/DATA:    CARDIAC MARKERS:                                6.3    5.01  )-----------( 62       ( 19 Feb 2020 07:02 )             20.5     02-19    140  |  105  |  30<H>  ----------------------------<  169<H>  4.8   |  22  |  1.65<H>    Ca    8.9      19 Feb 2020 07:02  Phos  4.2     02-19  Mg     2.0     02-19    TPro  6.5  /  Alb  3.1<L>  /  TBili  0.2  /  DBili  x   /  AST  16  /  ALT  11  /  AlkPhos  70  02-17    proBNP:   Lipid Profile:   HgA1c: Hemoglobin A1C, Whole Blood: 7.2 % (02-18 @ 13:53)    TSH:     TELE:  EKG:

## 2020-02-19 NOTE — PROGRESS NOTE ADULT - SUBJECTIVE AND OBJECTIVE BOX
No pain, no shortness of breath. Pleasantly confused. Spoke to GI fellow and they would like to pursue and flex sig. Colorectal notes noted and family refused any surgical intervention if something is found. For now H/H is stable.     Review of systems: All 10 points ROS was obtained except as above.     ALBUTerol    90 MICROgram(s) HFA Inhaler 2 Puff(s) Inhalation every 6 hours PRN  atorvastatin 40 milliGRAM(s) Oral at bedtime  budesonide 160 MICROgram(s)/formoterol 4.5 MICROgram(s) Inhaler 2 Puff(s) Inhalation two times a day  dextrose 40% Gel 15 Gram(s) Oral once PRN  dextrose 50% Injectable 12.5 Gram(s) IV Push once  dextrose 50% Injectable 25 Gram(s) IV Push once  dextrose 50% Injectable 25 Gram(s) IV Push once  donepezil 5 milliGRAM(s) Oral at bedtime  glucagon  Injectable 1 milliGRAM(s) IntraMuscular once PRN  insulin lispro (HumaLOG) corrective regimen sliding scale   SubCutaneous three times a day before meals  insulin lispro (HumaLOG) corrective regimen sliding scale   SubCutaneous at bedtime  levothyroxine 125 MICROGram(s) Oral daily  metoprolol succinate ER 50 milliGRAM(s) Oral once  pantoprazole  Injectable 40 milliGRAM(s) IV Push daily  PARoxetine 30 milliGRAM(s) Oral daily  QUEtiapine 100 milliGRAM(s) Oral at bedtime      VITAL:  T(C): , Max: 37.3 (20 @ 19:58)  T(F): , Max: 99.1 (20 @ 19:58)  HR: 80 (20 @ 16:44)  BP: 179/100 (20 @ 16:44)  BP(mean): --  RR: 18 (20 @ 16:44)  SpO2: 97% (20 @ 16:44)    20 @ 07:01  -  20 @ 07:00  --------------------------------------------------------  IN: 840 mL / OUT: 0 mL / NET: 840 mL        PHYSICAL EXAM:  General: NAD; Alert  HEENT:  NCAT; PERRLA  Neck: No JVD; supple  Respiratory: CTA-b/l  Cardiac: RRR s1s2  Gastrointestinal: BS+, soft, NT/ND  Urologic: External Female catheter put on.   Extremities: No peripheral edema      LABS:                          8.9    9.25  )-----------( 193      ( 2020 10:55 )             28.4     Na(140)/K(4.8)/Cl(105)/HCO3(22)/BUN(30)/Cr(1.65)Glu(169)/Ca(8.9)/Mg(2.0)/PO4(4.2)     @ 07:02  Na(138)/K(4.2)/Cl(104)/HCO3(22)/BUN(33)/Cr(1.48)Glu(160)/Ca(8.8)/Mg(1.9)/PO4(3.4)     @ 10:59  Na(136)/K(4.7)/Cl(103)/HCO3(22)/BUN(31)/Cr(1.25)Glu(195)/Ca(9.0)/Mg(--)/PO4(--)     @ 08:44        140  |  105  |  30<H>  ----------------------------<  169<H>  4.8   |  22  |  1.65<H>    Ca    8.9      2020 07:02  Phos  4.2       Mg     2.0           Urinalysis Basic - ( 2020 02:10 )    Color: Light Yellow / Appearance: Turbid / S.012 / pH: x  Gluc: x / Ketone: Negative  / Bili: Negative / Urobili: <2 mg/dL   Blood: x / Protein: 100 mg/dL / Nitrite: Negative   Leuk Esterase: Large / RBC: 10 /HPF /  /HPF   Sq Epi: x / Non Sq Epi: 5 /HPF / Bacteria: Many      Creatinine, Random Urine: 48 mg/dL ( @ 22:32)  Protein/Creatinine Ratio Calculation: 0.7 Ratio ( @ 22:32)  Sodium, Random Urine: 49 mmol/L ( @ 22:32)

## 2020-02-19 NOTE — PROGRESS NOTE ADULT - ASSESSMENT
86 yo woman presents w large volume maroon bloody BMs w large amount of clots, HH was 8.5/28 on admission, rpt was 6, received total 2 Units PRBC, maintained a stable BP, though was orthostatic,   keep on clears, advance diet in am if GI clears  cont IVF  resume Toprol 2/2 BP starting to rise    HH is stable  DVT ppx w PAS

## 2020-02-19 NOTE — PROGRESS NOTE ADULT - SUBJECTIVE AND OBJECTIVE BOX
Patient is a 87y old  Female who presents with a chief complaint of LGI bleed (2020 17:38)      SUBJECTIVE / OVERNIGHT EVENTS: ptn feels better, less blood in BMs, HH stable, conservative management planned, no scopes planned    MEDICATIONS  (STANDING):  atorvastatin 40 milliGRAM(s) Oral at bedtime  budesonide 160 MICROgram(s)/formoterol 4.5 MICROgram(s) Inhaler 2 Puff(s) Inhalation two times a day  cefTRIAXone   IVPB      dextrose 50% Injectable 12.5 Gram(s) IV Push once  dextrose 50% Injectable 25 Gram(s) IV Push once  dextrose 50% Injectable 25 Gram(s) IV Push once  donepezil 5 milliGRAM(s) Oral at bedtime  insulin lispro (HumaLOG) corrective regimen sliding scale   SubCutaneous three times a day before meals  insulin lispro (HumaLOG) corrective regimen sliding scale   SubCutaneous at bedtime  levothyroxine 125 MICROGram(s) Oral daily  pantoprazole  Injectable 40 milliGRAM(s) IV Push daily  PARoxetine 30 milliGRAM(s) Oral daily  QUEtiapine 100 milliGRAM(s) Oral at bedtime    MEDICATIONS  (PRN):  ALBUTerol    90 MICROgram(s) HFA Inhaler 2 Puff(s) Inhalation every 6 hours PRN Bronchospasm  dextrose 40% Gel 15 Gram(s) Oral once PRN Blood Glucose LESS THAN 70 milliGRAM(s)/deciliter  glucagon  Injectable 1 milliGRAM(s) IntraMuscular once PRN Glucose LESS THAN 70 milligrams/deciliter      Vital Signs Last 24 Hrs  T(F): 98.6 (20 @ 21:02), Max: 98.6 (20 @ 21:02)  HR: 66 (20 @ 21:02) (66 - 90)  BP: 165/78 (20 @ 21:02) (108/62 - 179/100)  RR: 18 (20 @ 21:02) (18 - 18)  SpO2: 100% (20 @ 21:02) (93% - 100%)  Telemetry:   CAPILLARY BLOOD GLUCOSE      POCT Blood Glucose.: 194 mg/dL (2020 21:44)  POCT Blood Glucose.: 128 mg/dL (2020 17:45)  POCT Blood Glucose.: 128 mg/dL (2020 13:24)  POCT Blood Glucose.: 164 mg/dL (2020 08:33)    I&O's Summary    2020 07:01  -  2020 07:00  --------------------------------------------------------  IN: 840 mL / OUT: 0 mL / NET: 840 mL    2020 07:01  -  2020 22:20  --------------------------------------------------------  IN: 940 mL / OUT: 700 mL / NET: 240 mL        PHYSICAL EXAM:  GENERAL: NAD, well-developed  HEAD:  Atraumatic, Normocephalic  EYES: EOMI, PERRLA, conjunctiva and sclera clear  NECK: Supple, No JVD  CHEST/LUNG: Clear to auscultation bilaterally; No wheeze  HEART: Regular rate and rhythm; No murmurs, rubs, or gallops  ABDOMEN: Soft, Nontender, Nondistended; Bowel sounds present  EXTREMITIES:  2+ Peripheral Pulses, No clubbing, cyanosis, or edema  PSYCH: AAOx3  NEUROLOGY: non-focal  SKIN: No rashes or lesions    LABS:                        9.6    9.57  )-----------( 224      ( 2020 19:55 )             30.7     -19    140  |  105  |  30<H>  ----------------------------<  169<H>  4.8   |  22  |  1.65<H>    Ca    8.9      2020 07:02  Phos  4.2     -  Mg     2.0                 Urinalysis Basic - ( 2020 21:15 )    Color: Light Yellow / Appearance: Slightly Turbid / S.012 / pH: x  Gluc: x / Ketone: Negative  / Bili: Negative / Urobili: Negative   Blood: x / Protein: Trace / Nitrite: Negative   Leuk Esterase: Large / RBC: 1 /hpf / WBC 83 /HPF   Sq Epi: x / Non Sq Epi: 2 /hpf / Bacteria: Moderate        RADIOLOGY & ADDITIONAL TESTS:    Imaging Personally Reviewed:    Consultant(s) Notes Reviewed:      Care Discussed with Consultants/Other Providers:

## 2020-02-19 NOTE — PROGRESS NOTE ADULT - ASSESSMENT
86 yo hx of breast CA, HTN, COPD, T2DM, diastolic CHF, hypothyroidism, p/w 1 episode of BRBPR; daughter notes she was helping patient clean up around 2h PTA and noted blood with clots on toilet paper and a small amount in the toilet bowl. She had 2 more episodes while she was in ER with blood mixed with stool.     # Hematochezia with long segment wall thickening involving the proximal sigmoid and descending colon favoring colitis and extensive diverticular disease.    DDx: diverticular bleed, ischemic or infectious colitis   # Dementia   # hx of breast CA  # diastolic CHF    Recommendations:  - f/u repeat CBC  - Send stool for GI PCR, C. DIFF  - Clear liquid diet for now  - Discussed with Dr Valencia about possible flex sig. Still in discussion with family about endoscopy  - Continue to monitor h&h and transfuse if hgb drop below 7 88 yo hx of breast CA, HTN, COPD, T2DM, diastolic CHF, hypothyroidism, p/w 1 episode of BRBPR; daughter notes she was helping patient clean up around 2h PTA and noted blood with clots on toilet paper and a small amount in the toilet bowl. She had 2 more episodes while she was in ER with blood mixed with stool.     # Hematochezia with long segment wall thickening involving the proximal sigmoid and descending colon favoring colitis and extensive diverticular disease.    DDx: diverticular bleed, ischemic or infectious colitis   # Dementia   # hx of breast CA  # diastolic CHF    Recommendations:  - f/u repeat CBC  - Send stool for GI PCR, C. DIFF  - please keep NPO for now  - Discussed with Dr Valencia about possible flex sig. Still in discussion with family about endoscopy  - Continue to monitor h&h and transfuse if hgb drop below 7

## 2020-02-19 NOTE — DIETITIAN INITIAL EVALUATION ADULT. - PHYSICAL APPEARANCE
well nourished/other (specify) Ht: 60in, Wt: 160lbs, BMI: 31.3kg/m2, IBW: 100lbs +/- 10%, %IBW: 160%  Edema: none noted  Skin per nursing flowsheets: stage 2 pressure ulcer (coccyx, buttocks)

## 2020-02-19 NOTE — PROGRESS NOTE ADULT - ASSESSMENT
ASSESSMENT/PLAN  ASSESSMENT:   Ms. Alejandra is an 88 yo lady with PMH of  breast CA, HTN, COPD, T2DM, diastolic CHF, hypothyroidism, presented with 1 episode of BRBPR; daughter notes she was helping patient clean up around 2h PTA and noted blood with clots on toilet paper and a small amount in the toilet bowl. She had 2 more episodes while she was in ER with blood mixed with stool. She now is having more of clots coming out. Patient is unable to provide history due to her baseline dementia No fever or chills were reported. No abdominal pain. Nephrology consulted for CKD stage 3.     1. CKD stage 3. Her Scr increased to 1.65 mg/dl as she is not eating or drinking.   2. Electrolytes are acceptable.   3. Intravascularly depleted.   4. Acute blood loss anemia.   5. Leukocytosis.   6. Diffuse wall thickening with possible cystitis.   7. Possible GI Bleed.     RECOMMEND:  - Discontinue the IVF NS  - Urine culture.    - Avoid NSAIDs and nephrotoxins.   - Renal dose all medication for GFR <35 to 45 ml/min.   - Hemoglobin < 7.0.     Thank you for involving [facility] in this patient's care.    With warm regards,        Erika Valencia, DO  [employer]  [phone]

## 2020-02-19 NOTE — DIETITIAN INITIAL EVALUATION ADULT. - ADD RECOMMEND
1) Medical team to advance diet when medically feasible via tolerated route. Consider advancing to clear liquid, followed by low fiber diet as tolerated. If NPO status > 7 days, consider alternate means of nutrition if within pt/family GOC. 2) Recommend Julian (80 calories, 14 grams amino acids) 2x daily when diet is advanced. 3) Monitor weight trends, labs, and skin integrity.

## 2020-02-19 NOTE — CHART NOTE - NSCHARTNOTEFT_GEN_A_CORE
MEDICINE NP    SAI MENSAH  87y Female    Patient is a 87y old  Female who presents with a chief complaint of LGI bleed (18 Feb 2020 22:21)       > Event Summary:  Notified by RN, Patient repeat Hgb 7.1.  Also reports patient with x2 recurrent bowel movements with dark stools and clots tonight.  86y/o Female presented with large volume maroon bloody BM, Hgb 6.  Admitted with LGIB s/p 1U PRBC tx.    Now with recurrent Anemia.  -BP noted 108/62 downtrending from 148/79.  Patient seen at bedside, asymptomatic.    -Attending note reviewed with goal of HH 10/30.  Will transfue 1u PRBC.  F/u repeat CBC   -f/u GI in AM  -C/w Current regimen PPI           -Vital Signs Last 24 Hrs  T(C): 36.6 (19 Feb 2020 01:03), Max: 37.3 (18 Feb 2020 19:58)  T(F): 97.9 (19 Feb 2020 01:03), Max: 99.1 (18 Feb 2020 19:58)  HR: 90 (19 Feb 2020 01:03) (73 - 90)  BP: 108/62 (19 Feb 2020 01:03) (108/62 - 172/91)  RR: 18 (19 Feb 2020 01:03) (18 - 18)  SpO2: 97% (19 Feb 2020 01:03) (97% - 99%)          ABDIAZIZ Verdugo-BC  Medicine Department  #[id number]

## 2020-02-19 NOTE — CONSULT NOTE ADULT - ASSESSMENT
88 yo hx of breast CA, dementia,  HTN, COPD, T2DM, diastolic CHF, hypothyroidism, developed BRBPR this am, daughter notes she was helping patient clean up around 6 am and noted blood with clots on toilet paper and a small amount in the toilet bowl. Daughter denies pt reporting CP, SOB, or dizziness. Ptn has baseline dementia, appears confortable and denies any discomfort. pale appearing, Ptn had LGI bleed 5 years ago but daughter unclear about the results. (17 Feb 2020 15:54)    Pt afebrile, normal WBC.  Lact 2.4.  UA large LE and pyuria.  Thus far stools studies:  Cdiff (-) and GI pcr (-).  Ct ap shows longer segment wall thickening involving the proximal sigmoid and descending colon favoring colitis, extensive diverticular disease and diffuse bladder wall thickening; correlate with urinalysis for underlying cystitis.  Pt is on rocephin.     L sided colitis:    - Pt with descending colitis - ischemic vs. infectious colitis.  Thus far Cdiff neg.  GI pcr neg.      - Cont clears.  GI following, planned for possible sigmoidoscopy pending family decision.     - Monitor H/H, transfuse PRN    - Continue to monitor h&h and transfuse if hgb drop below 7        - Monitor stool outpt, serial abd exams.      Cystitis/Uti:    - UA large LE with pyuria.  f/u urine cx.    - Cont rocephin for now.      Will follow,    Sharita Mishra  [phone]

## 2020-02-20 LAB
ANION GAP SERPL CALC-SCNC: 13 MMOL/L — SIGNIFICANT CHANGE UP (ref 5–17)
BUN SERPL-MCNC: 25 MG/DL — HIGH (ref 7–23)
CALCIUM SERPL-MCNC: 9.2 MG/DL — SIGNIFICANT CHANGE UP (ref 8.4–10.5)
CHLORIDE SERPL-SCNC: 105 MMOL/L — SIGNIFICANT CHANGE UP (ref 96–108)
CO2 SERPL-SCNC: 20 MMOL/L — LOW (ref 22–31)
CREAT SERPL-MCNC: 1.44 MG/DL — HIGH (ref 0.5–1.3)
GLUCOSE BLDC GLUCOMTR-MCNC: 128 MG/DL — HIGH (ref 70–99)
GLUCOSE BLDC GLUCOMTR-MCNC: 137 MG/DL — HIGH (ref 70–99)
GLUCOSE BLDC GLUCOMTR-MCNC: 157 MG/DL — HIGH (ref 70–99)
GLUCOSE BLDC GLUCOMTR-MCNC: 207 MG/DL — HIGH (ref 70–99)
GLUCOSE SERPL-MCNC: 149 MG/DL — HIGH (ref 70–99)
HCT VFR BLD CALC: 26.7 % — LOW (ref 34.5–45)
HCT VFR BLD CALC: 28.4 % — LOW (ref 34.5–45)
HCT VFR BLD CALC: 29.5 % — LOW (ref 34.5–45)
HGB BLD-MCNC: 8.3 G/DL — LOW (ref 11.5–15.5)
HGB BLD-MCNC: 9 G/DL — LOW (ref 11.5–15.5)
HGB BLD-MCNC: 9.2 G/DL — LOW (ref 11.5–15.5)
MCHC RBC-ENTMCNC: 26.1 PG — LOW (ref 27–34)
MCHC RBC-ENTMCNC: 26.6 PG — LOW (ref 27–34)
MCHC RBC-ENTMCNC: 26.7 PG — LOW (ref 27–34)
MCHC RBC-ENTMCNC: 31.1 GM/DL — LOW (ref 32–36)
MCHC RBC-ENTMCNC: 31.2 GM/DL — LOW (ref 32–36)
MCHC RBC-ENTMCNC: 31.7 GM/DL — LOW (ref 32–36)
MCV RBC AUTO: 84 FL — SIGNIFICANT CHANGE UP (ref 80–100)
MCV RBC AUTO: 84.3 FL — SIGNIFICANT CHANGE UP (ref 80–100)
MCV RBC AUTO: 85.3 FL — SIGNIFICANT CHANGE UP (ref 80–100)
NRBC # BLD: 0 /100 WBCS — SIGNIFICANT CHANGE UP (ref 0–0)
NT-PROBNP SERPL-SCNC: 360 PG/ML — HIGH (ref 0–300)
PLATELET # BLD AUTO: 220 K/UL — SIGNIFICANT CHANGE UP (ref 150–400)
PLATELET # BLD AUTO: 235 K/UL — SIGNIFICANT CHANGE UP (ref 150–400)
PLATELET # BLD AUTO: 239 K/UL — SIGNIFICANT CHANGE UP (ref 150–400)
POTASSIUM SERPL-MCNC: 4.5 MMOL/L — SIGNIFICANT CHANGE UP (ref 3.5–5.3)
POTASSIUM SERPL-SCNC: 4.5 MMOL/L — SIGNIFICANT CHANGE UP (ref 3.5–5.3)
RBC # BLD: 3.18 M/UL — LOW (ref 3.8–5.2)
RBC # BLD: 3.37 M/UL — LOW (ref 3.8–5.2)
RBC # BLD: 3.46 M/UL — LOW (ref 3.8–5.2)
RBC # FLD: 15.7 % — HIGH (ref 10.3–14.5)
SODIUM SERPL-SCNC: 138 MMOL/L — SIGNIFICANT CHANGE UP (ref 135–145)
WBC # BLD: 8.42 K/UL — SIGNIFICANT CHANGE UP (ref 3.8–10.5)
WBC # BLD: 8.8 K/UL — SIGNIFICANT CHANGE UP (ref 3.8–10.5)
WBC # BLD: 9.56 K/UL — SIGNIFICANT CHANGE UP (ref 3.8–10.5)
WBC # FLD AUTO: 8.42 K/UL — SIGNIFICANT CHANGE UP (ref 3.8–10.5)
WBC # FLD AUTO: 8.8 K/UL — SIGNIFICANT CHANGE UP (ref 3.8–10.5)
WBC # FLD AUTO: 9.56 K/UL — SIGNIFICANT CHANGE UP (ref 3.8–10.5)

## 2020-02-20 PROCEDURE — 99232 SBSQ HOSP IP/OBS MODERATE 35: CPT

## 2020-02-20 RX ORDER — AMLODIPINE BESYLATE 2.5 MG/1
5 TABLET ORAL DAILY
Refills: 0 | Status: DISCONTINUED | OUTPATIENT
Start: 2020-02-20 | End: 2020-02-21

## 2020-02-20 RX ADMIN — DONEPEZIL HYDROCHLORIDE 5 MILLIGRAM(S): 10 TABLET, FILM COATED ORAL at 21:10

## 2020-02-20 RX ADMIN — Medication 50 MILLIGRAM(S): at 05:10

## 2020-02-20 RX ADMIN — Medication 30 MILLIGRAM(S): at 13:16

## 2020-02-20 RX ADMIN — CEFTRIAXONE 100 MILLIGRAM(S): 500 INJECTION, POWDER, FOR SOLUTION INTRAMUSCULAR; INTRAVENOUS at 19:57

## 2020-02-20 RX ADMIN — Medication 125 MICROGRAM(S): at 05:10

## 2020-02-20 RX ADMIN — BUDESONIDE AND FORMOTEROL FUMARATE DIHYDRATE 2 PUFF(S): 160; 4.5 AEROSOL RESPIRATORY (INHALATION) at 05:10

## 2020-02-20 RX ADMIN — BUDESONIDE AND FORMOTEROL FUMARATE DIHYDRATE 2 PUFF(S): 160; 4.5 AEROSOL RESPIRATORY (INHALATION) at 18:13

## 2020-02-20 RX ADMIN — PANTOPRAZOLE SODIUM 40 MILLIGRAM(S): 20 TABLET, DELAYED RELEASE ORAL at 13:17

## 2020-02-20 RX ADMIN — AMLODIPINE BESYLATE 5 MILLIGRAM(S): 2.5 TABLET ORAL at 18:13

## 2020-02-20 RX ADMIN — Medication 2: at 13:17

## 2020-02-20 RX ADMIN — QUETIAPINE FUMARATE 100 MILLIGRAM(S): 200 TABLET, FILM COATED ORAL at 21:10

## 2020-02-20 RX ADMIN — ATORVASTATIN CALCIUM 40 MILLIGRAM(S): 80 TABLET, FILM COATED ORAL at 21:10

## 2020-02-20 NOTE — PROGRESS NOTE ADULT - ASSESSMENT
GIB  fu with GI for possible sigmoidoscopy   H/H stable as of yesterday   fu labs today     HTN  stable for now  if persistently elevated , we can resume slowly her outpt meds    Diastolic CHF   chronic   stable   diuretics on hold for now given active GIB    CKD  pt follow up with renal   stable

## 2020-02-20 NOTE — PROGRESS NOTE ADULT - SUBJECTIVE AND OBJECTIVE BOX
Chief Complaint:  Patient is a 87y old  Female who presents with a chief complaint of LGI bleed (2020 07:16)      Interval Events: No BMs yesterday. Denies abdominal pain, rested comfortably     Allergies:  Vasotec (Unknown)      Hospital Medications:  ALBUTerol    90 MICROgram(s) HFA Inhaler 2 Puff(s) Inhalation every 6 hours PRN  atorvastatin 40 milliGRAM(s) Oral at bedtime  budesonide 160 MICROgram(s)/formoterol 4.5 MICROgram(s) Inhaler 2 Puff(s) Inhalation two times a day  cefTRIAXone   IVPB      cefTRIAXone   IVPB 1000 milliGRAM(s) IV Intermittent every 24 hours  dextrose 40% Gel 15 Gram(s) Oral once PRN  dextrose 50% Injectable 12.5 Gram(s) IV Push once  dextrose 50% Injectable 25 Gram(s) IV Push once  dextrose 50% Injectable 25 Gram(s) IV Push once  donepezil 5 milliGRAM(s) Oral at bedtime  glucagon  Injectable 1 milliGRAM(s) IntraMuscular once PRN  insulin lispro (HumaLOG) corrective regimen sliding scale   SubCutaneous three times a day before meals  insulin lispro (HumaLOG) corrective regimen sliding scale   SubCutaneous at bedtime  levothyroxine 125 MICROGram(s) Oral daily  metoprolol succinate ER 50 milliGRAM(s) Oral daily  pantoprazole  Injectable 40 milliGRAM(s) IV Push daily  PARoxetine 30 milliGRAM(s) Oral daily  QUEtiapine 100 milliGRAM(s) Oral at bedtime      PMHX/PSHX:  Chronic Diastolic Heart Failure  Chronic Obstructive Pulmonary Disease (COPD)  Depression  Dementia  Diabetes Mellitus Type II  History of Hypothyroidism  Hypertension  S/P total B/LKnee Replacement      Family history:  No pertinent family history in first degree relatives      ROS:     General:  No wt loss, fevers, chills, night sweats, fatigue,   Eyes:  Good vision, no reported pain  ENT:  No sore throat, pain, runny nose, dysphagia  CV:  No pain, palpitations, hypo/hypertension  Resp:  No dyspnea, cough, tachypnea, wheezing  GI:  See HPI  :  No pain, bleeding, incontinence, nocturia  Muscle:  No pain, weakness  Neuro:  No weakness, tingling, memory problems  Psych:  No fatigue, insomnia, mood problems, depression  Endocrine:  No polyuria, polydipsia, cold/heat intolerance  Heme:  No petechiae, ecchymosis, easy bruisability  Skin:  No rash, edema      PHYSICAL EXAM:     Vital Signs:  Vital Signs Last 24 Hrs  T(C): 36.4 (2020 04:33), Max: 37 (2020 21:02)  T(F): 97.6 (2020 04:33), Max: 98.6 (2020 21:02)  HR: 62 (2020 04:33) (62 - 80)  BP: 147/74 (2020 04:33) (134/73 - 179/100)  BP(mean): --  RR: 18 (2020 04:33) (18 - 18)  SpO2: 99% (2020 04:33) (96% - 100%)  Daily     Daily Weight in k.5 (2020 16:06)    GENERAL:  appears comfortable, no acute distress  HEENT:  NC/AT,  conjunctivae clear, sclera -anicteric  CHEST:  no increased effort  HEART:  Regular rate and rhythm  ABDOMEN:  Soft, non-tender, non-distended,  no masses ,no hepato-splenomegaly,   EXTREMITIES:  no cyanosis, clubbing or edema  SKIN:  No rash/erythema/ecchymoses/petechiae/wounds  NEURO:  Alert, oriented    LABS:                        8.3    8.80  )-----------( 220      ( 2020 07:13 )             26.7     02-20    138  |  105  |  25<H>  ----------------------------<  149<H>  4.5   |  20<L>  |  1.44<H>    Ca    9.2      2020 07:09  Phos  4.2     02-19  Mg     2.0     02-19          Urinalysis Basic - ( 2020 21:15 )    Color: Light Yellow / Appearance: Slightly Turbid / S.012 / pH: x  Gluc: x / Ketone: Negative  / Bili: Negative / Urobili: Negative   Blood: x / Protein: Trace / Nitrite: Negative   Leuk Esterase: Large / RBC: 1 /hpf / WBC 83 /HPF   Sq Epi: x / Non Sq Epi: 2 /hpf / Bacteria: Moderate          Imaging:    < from: CT Abdomen and Pelvis w/ IV Cont (20 @ 10:16) >  IMPRESSION:     Longer segment wall thickening involving the proximal sigmoid and descending colon favoring colitis.    Extensive diverticular disease.    Diffuse bladder wall thickening; correlate with urinalysis for underlying cystitis.        < end of copied text >

## 2020-02-20 NOTE — PROGRESS NOTE ADULT - SUBJECTIVE AND OBJECTIVE BOX
Patient is a 87y old  Female who presents with a chief complaint of LGI bleed (2020 09:33)      SUBJECTIVE / OVERNIGHT EVENTS: no new c/o, ptn has dark loose BMs, non watery no fever, no chills, no abd pain, daughter at bedside, tolerating solids    MEDICATIONS  (STANDING):  atorvastatin 40 milliGRAM(s) Oral at bedtime  budesonide 160 MICROgram(s)/formoterol 4.5 MICROgram(s) Inhaler 2 Puff(s) Inhalation two times a day  cefTRIAXone   IVPB      cefTRIAXone   IVPB 1000 milliGRAM(s) IV Intermittent every 24 hours  dextrose 50% Injectable 12.5 Gram(s) IV Push once  dextrose 50% Injectable 25 Gram(s) IV Push once  dextrose 50% Injectable 25 Gram(s) IV Push once  donepezil 5 milliGRAM(s) Oral at bedtime  insulin lispro (HumaLOG) corrective regimen sliding scale   SubCutaneous three times a day before meals  insulin lispro (HumaLOG) corrective regimen sliding scale   SubCutaneous at bedtime  levothyroxine 125 MICROGram(s) Oral daily  metoprolol succinate ER 50 milliGRAM(s) Oral daily  pantoprazole  Injectable 40 milliGRAM(s) IV Push daily  PARoxetine 30 milliGRAM(s) Oral daily  QUEtiapine 100 milliGRAM(s) Oral at bedtime    MEDICATIONS  (PRN):  ALBUTerol    90 MICROgram(s) HFA Inhaler 2 Puff(s) Inhalation every 6 hours PRN Bronchospasm  dextrose 40% Gel 15 Gram(s) Oral once PRN Blood Glucose LESS THAN 70 milliGRAM(s)/deciliter  glucagon  Injectable 1 milliGRAM(s) IntraMuscular once PRN Glucose LESS THAN 70 milligrams/deciliter      Vital Signs Last 24 Hrs  T(F): 98.2 (20 @ 16:50), Max: 98.6 (20 @ 21:02)  HR: 61 (20 @ 16:50) (58 - 70)  BP: 156/79 (20 @ 16:50) (134/73 - 165/78)  RR: 18 (20 @ 16:50) (18 - 18)  SpO2: 99% (20 @ 16:50) (96% - 100%)  Telemetry:   CAPILLARY BLOOD GLUCOSE      POCT Blood Glucose.: 128 mg/dL (2020 17:30)  POCT Blood Glucose.: 207 mg/dL (2020 12:21)  POCT Blood Glucose.: 137 mg/dL (2020 07:59)  POCT Blood Glucose.: 194 mg/dL (2020 21:44)  POCT Blood Glucose.: 128 mg/dL (2020 17:45)    I&O's Summary    2020 07:01  -  2020 07:00  --------------------------------------------------------  IN: 1180 mL / OUT: 700 mL / NET: 480 mL        PHYSICAL EXAM:  GENERAL: NAD, well-developed  HEAD:  Atraumatic, Normocephalic  EYES: EOMI, PERRLA, conjunctiva and sclera clear  NECK: Supple, No JVD  CHEST/LUNG: Clear to auscultation bilaterally; No wheeze  HEART: Regular rate and rhythm; No murmurs, rubs, or gallops  ABDOMEN: Soft, Nontender, Nondistended; Bowel sounds present  EXTREMITIES:  2+ Peripheral Pulses, No clubbing, cyanosis, or edema  PSYCH: AAOx3  NEUROLOGY: non-focal  SKIN: No rashes or lesions    LABS:                        9.2    8.42  )-----------( 235      ( 2020 10:15 )             29.5     02-20    138  |  105  |  25<H>  ----------------------------<  149<H>  4.5   |  20<L>  |  1.44<H>    Ca    9.2      2020 07:09  Phos  4.2     02-19  Mg     2.0     02-19            Urinalysis Basic - ( 2020 21:15 )    Color: Light Yellow / Appearance: Slightly Turbid / S.012 / pH: x  Gluc: x / Ketone: Negative  / Bili: Negative / Urobili: Negative   Blood: x / Protein: Trace / Nitrite: Negative   Leuk Esterase: Large / RBC: 1 /hpf / WBC 83 /HPF   Sq Epi: x / Non Sq Epi: 2 /hpf / Bacteria: Moderate        RADIOLOGY & ADDITIONAL TESTS:    Imaging Personally Reviewed:    Consultant(s) Notes Reviewed:      Care Discussed with Consultants/Other Providers:

## 2020-02-20 NOTE — PROGRESS NOTE ADULT - SUBJECTIVE AND OBJECTIVE BOX
No pain or SOB. Had dark tarry stools today. Pleasantly confused.     Review of systems: Unable to obtain as pt is confused.     ALBUTerol    90 MICROgram(s) HFA Inhaler 2 Puff(s) Inhalation every 6 hours PRN  amLODIPine   Tablet 5 milliGRAM(s) Oral daily  atorvastatin 40 milliGRAM(s) Oral at bedtime  budesonide 160 MICROgram(s)/formoterol 4.5 MICROgram(s) Inhaler 2 Puff(s) Inhalation two times a day  cefTRIAXone   IVPB      cefTRIAXone   IVPB 1000 milliGRAM(s) IV Intermittent every 24 hours  dextrose 40% Gel 15 Gram(s) Oral once PRN  dextrose 50% Injectable 12.5 Gram(s) IV Push once  dextrose 50% Injectable 25 Gram(s) IV Push once  dextrose 50% Injectable 25 Gram(s) IV Push once  donepezil 5 milliGRAM(s) Oral at bedtime  glucagon  Injectable 1 milliGRAM(s) IntraMuscular once PRN  insulin lispro (HumaLOG) corrective regimen sliding scale   SubCutaneous three times a day before meals  insulin lispro (HumaLOG) corrective regimen sliding scale   SubCutaneous at bedtime  levothyroxine 125 MICROGram(s) Oral daily  metoprolol succinate ER 50 milliGRAM(s) Oral daily  pantoprazole  Injectable 40 milliGRAM(s) IV Push daily  PARoxetine 30 milliGRAM(s) Oral daily  QUEtiapine 100 milliGRAM(s) Oral at bedtime      VITAL:  T(C): , Max: 37 (20 @ 21:02)  T(F): , Max: 98.6 (20 @ 21:02)  HR: 61 (20 @ 16:50)  BP: 156/79 (20 @ 16:50)    RR: 18 (20 @ 16:50)  SpO2: 99% (20 @ 16:50)  Wt(kg): --    20 @ 07:01  -  20 @ 07:00  --------------------------------------------------------  IN: 1180 mL / OUT: 700 mL / NET: 480 mL        PHYSICAL EXAM:  General: NAD; Alert  HEENT:  NCAT; PERRLA  Neck: No JVD; supple  Respiratory: CTA-b/l  Cardiac: RRR s1s2  Gastrointestinal: BS+, soft, NT/ND  Urologic: No kim  Extremities: No peripheral edema      LABS:                          9.2    8.42  )-----------( 235      ( 2020 10:15 )             29.5     Na(138)/K(4.5)/Cl(105)/HCO3(20)/BUN(25)/Cr(1.44)Glu(149)/Ca(9.2)/Mg(--)/PO4(--)     @ 07:09  Na(140)/K(4.8)/Cl(105)/HCO3(22)/BUN(30)/Cr(1.65)Glu(169)/Ca(8.9)/Mg(2.0)/PO4(4.2)     @ 07:02  Na(138)/K(4.2)/Cl(104)/HCO3(22)/BUN(33)/Cr(1.48)Glu(160)/Ca(8.8)/Mg(1.9)/PO4(3.4)     @ 10:59        138  |  105  |  25<H>  ----------------------------<  149<H>  4.5   |  20<L>  |  1.44<H>    Ca    9.2      2020 07:09  Phos  4.2       Mg     2.0           Urinalysis Basic - ( 2020 21:15 )    Color: Light Yellow / Appearance: Slightly Turbid / S.012 / pH: x  Gluc: x / Ketone: Negative  / Bili: Negative / Urobili: Negative   Blood: x / Protein: Trace / Nitrite: Negative   Leuk Esterase: Large / RBC: 1 /hpf / WBC 83 /HPF   Sq Epi: x / Non Sq Epi: 2 /hpf / Bacteria: Moderate      Creatinine, Random Urine: 48 mg/dL ( @ 22:32)  Protein/Creatinine Ratio Calculation: 0.7 Ratio ( @ 22:32)  Sodium, Random Urine: 49 mmol/L ( @ 22:32)

## 2020-02-20 NOTE — PROGRESS NOTE ADULT - ASSESSMENT
88 yo hx of breast CA, HTN, COPD, T2DM, diastolic CHF, hypothyroidism, p/w 1 episode of BRBPR; daughter notes she was helping patient clean up around 2h PTA and noted blood with clots on toilet paper and a small amount in the toilet bowl. She had 2 more episodes while she was in ER with blood mixed with stool.     # Hematochezia with long segment wall thickening involving the proximal sigmoid and descending colon favoring colitis and extensive diverticular disease. : afebrile, no overt bleeding, hgb stable (suspect 9.6 hgb was a lab error, likely 8 is normal range given trend and clinical picture)   DDx: diverticular bleed, ischemic or infectious colitis   # Dementia   # hx of breast CA  # diastolic CHF    Recommendations:  - no further bleeding, can initiate cipro/flagyl for 5 days given concern for ischemic colitis  - monitor CBC and abdominal exam  - check lactate  - diet as tolerated, no plan for GI interventions at this time  - supportive care

## 2020-02-20 NOTE — PROGRESS NOTE ADULT - ASSESSMENT
86 yo woman presents w large volume maroon bloody BMs w large amount of clots, HH was 8.5/28 on admission, rpt was 6, received total 2 Units PRBC, maintained a stable BP, though was orthostatic,   today had a dark nonwatery stool, no abd pain, no fever no chills, diet upgraded by GI, tolerating LRD  DC IVF  resume her outptn anti HTN meds, cont Toprol   HH is stable  DVT ppx w PAS

## 2020-02-20 NOTE — PHYSICAL THERAPY INITIAL EVALUATION ADULT - PRECAUTIONS/LIMITATIONS, REHAB EVAL
+Abdomen/Pelvis CT 2/17/2020: Longer segment wall thickening involving the proximal sigmoid and descending colon favoring colitis. Extensive diverticular disease. Diffuse bladder wall thickening.

## 2020-02-20 NOTE — PROGRESS NOTE ADULT - ASSESSMENT
ASSESSMENT/PLAN  ASSESSMENT:   Ms. Alejandra is an 86 yo lady with PMH of  breast CA, HTN, COPD, T2DM, diastolic CHF, hypothyroidism, presented with 1 episode of BRBPR; daughter notes she was helping patient clean up around 2h PTA and noted blood with clots on toilet paper and a small amount in the toilet bowl. She had 2 more episodes while she was in ER with blood mixed with stool. She now is having more of clots coming out. Patient is unable to provide history due to her baseline dementia No fever or chills were reported. No abdominal pain. Nephrology consulted for CKD stage 3.     1. CKD stage 3. Her Scr decreased to 1.44 mg/dl as she is not eating or drinking.   2. Non anion gap metabolic acidosis.   3. Intravascularly depleted.   4. Acute blood loss anemia. Hemoglobin is 9.2.   5. HTN.   6. Diffuse wall thickening with possible cystitis.   7. Active GI Bleed.     RECOMMEND:  - Hold diuretics and IVF.   - Urine culture.    - Avoid NSAIDs and nephrotoxins.   - Renal dose all medication for GFR < 45 ml/min.   - Give blood transfusion for Hemoglobin < 7.0.   - BMP, CBC, Magnesium and phosphorus.       Erika Valencia, DO  [employer]  [phone]

## 2020-02-20 NOTE — PROGRESS NOTE ADULT - SUBJECTIVE AND OBJECTIVE BOX
Subjective: Patient seen and examined. No new events except as noted.     SUBJECTIVE/ROS:        MEDICATIONS:  MEDICATIONS  (STANDING):  atorvastatin 40 milliGRAM(s) Oral at bedtime  budesonide 160 MICROgram(s)/formoterol 4.5 MICROgram(s) Inhaler 2 Puff(s) Inhalation two times a day  cefTRIAXone   IVPB      cefTRIAXone   IVPB 1000 milliGRAM(s) IV Intermittent every 24 hours  dextrose 50% Injectable 12.5 Gram(s) IV Push once  dextrose 50% Injectable 25 Gram(s) IV Push once  dextrose 50% Injectable 25 Gram(s) IV Push once  donepezil 5 milliGRAM(s) Oral at bedtime  insulin lispro (HumaLOG) corrective regimen sliding scale   SubCutaneous three times a day before meals  insulin lispro (HumaLOG) corrective regimen sliding scale   SubCutaneous at bedtime  levothyroxine 125 MICROGram(s) Oral daily  metoprolol succinate ER 50 milliGRAM(s) Oral daily  pantoprazole  Injectable 40 milliGRAM(s) IV Push daily  PARoxetine 30 milliGRAM(s) Oral daily  QUEtiapine 100 milliGRAM(s) Oral at bedtime      PHYSICAL EXAM:  T(C): 36.4 (02-20-20 @ 04:33), Max: 37 (02-19-20 @ 21:02)  HR: 62 (02-20-20 @ 04:33) (62 - 80)  BP: 147/74 (02-20-20 @ 04:33) (134/73 - 179/100)  RR: 18 (02-20-20 @ 04:33) (18 - 18)  SpO2: 99% (02-20-20 @ 04:33) (96% - 100%)  Wt(kg): --  I&O's Summary    19 Feb 2020 07:01  -  20 Feb 2020 07:00  --------------------------------------------------------  IN: 1180 mL / OUT: 700 mL / NET: 480 mL            JVP: Normal  Neck: supple  Lung: clear   CV: S1 S2 , Murmur:  Abd: soft  Ext: No edema  neuro: Awake   Psych: flat affect  Skin: normal``    LABS/DATA:    CARDIAC MARKERS:                                9.6    9.57  )-----------( 224      ( 19 Feb 2020 19:55 )             30.7     02-19    140  |  105  |  30<H>  ----------------------------<  169<H>  4.8   |  22  |  1.65<H>    Ca    8.9      19 Feb 2020 07:02  Phos  4.2     02-19  Mg     2.0     02-19      proBNP:   Lipid Profile:   HgA1c:   TSH: Thyroid Stimulating Hormone, Serum: 1.48 uIU/mL (02-19 @ 08:50)      TELE:  EKG:

## 2020-02-20 NOTE — PHYSICAL THERAPY INITIAL EVALUATION ADULT - PERTINENT HX OF CURRENT PROBLEM, REHAB EVAL
Pt is a 88 yo hx of breast CA, dementia,  HTN, COPD, T2DM, diastolic CHF, hypothyroidism, developed BRBPR this am, daughter notes she was helping patient clean up around 6 am and noted blood with clots on toilet paper and a small amount in the toilet bowl. (-) CXR 2/19/2020. Continued below.

## 2020-02-20 NOTE — PHYSICAL THERAPY INITIAL EVALUATION ADULT - PLANNED THERAPY INTERVENTIONS, PT EVAL
transfer training/balance training/GOAL: Stair Negotiation Training: Patient will be able to negotiate up & down 1 flight of stairs with unilateral rail, step to gait pattern, in 2 weeks./strengthening/bed mobility training/gait training

## 2020-02-21 LAB
ANION GAP SERPL CALC-SCNC: 12 MMOL/L — SIGNIFICANT CHANGE UP (ref 5–17)
BUN SERPL-MCNC: 26 MG/DL — HIGH (ref 7–23)
CALCIUM SERPL-MCNC: 9.1 MG/DL — SIGNIFICANT CHANGE UP (ref 8.4–10.5)
CHLORIDE SERPL-SCNC: 103 MMOL/L — SIGNIFICANT CHANGE UP (ref 96–108)
CO2 SERPL-SCNC: 24 MMOL/L — SIGNIFICANT CHANGE UP (ref 22–31)
CREAT SERPL-MCNC: 1.36 MG/DL — HIGH (ref 0.5–1.3)
GLUCOSE BLDC GLUCOMTR-MCNC: 131 MG/DL — HIGH (ref 70–99)
GLUCOSE BLDC GLUCOMTR-MCNC: 163 MG/DL — HIGH (ref 70–99)
GLUCOSE BLDC GLUCOMTR-MCNC: 173 MG/DL — HIGH (ref 70–99)
GLUCOSE BLDC GLUCOMTR-MCNC: 198 MG/DL — HIGH (ref 70–99)
GLUCOSE SERPL-MCNC: 160 MG/DL — HIGH (ref 70–99)
HCT VFR BLD CALC: 27.2 % — LOW (ref 34.5–45)
HCT VFR BLD CALC: 29 % — LOW (ref 34.5–45)
HGB BLD-MCNC: 8.7 G/DL — LOW (ref 11.5–15.5)
HGB BLD-MCNC: 9.2 G/DL — LOW (ref 11.5–15.5)
MAGNESIUM SERPL-MCNC: 2 MG/DL — SIGNIFICANT CHANGE UP (ref 1.6–2.6)
MCHC RBC-ENTMCNC: 26.8 PG — LOW (ref 27–34)
MCHC RBC-ENTMCNC: 26.9 PG — LOW (ref 27–34)
MCHC RBC-ENTMCNC: 31.7 GM/DL — LOW (ref 32–36)
MCHC RBC-ENTMCNC: 32 GM/DL — SIGNIFICANT CHANGE UP (ref 32–36)
MCV RBC AUTO: 84.2 FL — SIGNIFICANT CHANGE UP (ref 80–100)
MCV RBC AUTO: 84.5 FL — SIGNIFICANT CHANGE UP (ref 80–100)
NRBC # BLD: 0 /100 WBCS — SIGNIFICANT CHANGE UP (ref 0–0)
NRBC # BLD: 0 /100 WBCS — SIGNIFICANT CHANGE UP (ref 0–0)
PHOSPHATE SERPL-MCNC: 3.4 MG/DL — SIGNIFICANT CHANGE UP (ref 2.5–4.5)
PLATELET # BLD AUTO: 239 K/UL — SIGNIFICANT CHANGE UP (ref 150–400)
PLATELET # BLD AUTO: 271 K/UL — SIGNIFICANT CHANGE UP (ref 150–400)
POTASSIUM SERPL-MCNC: 3.7 MMOL/L — SIGNIFICANT CHANGE UP (ref 3.5–5.3)
POTASSIUM SERPL-SCNC: 3.7 MMOL/L — SIGNIFICANT CHANGE UP (ref 3.5–5.3)
RBC # BLD: 3.23 M/UL — LOW (ref 3.8–5.2)
RBC # BLD: 3.43 M/UL — LOW (ref 3.8–5.2)
RBC # FLD: 15.5 % — HIGH (ref 10.3–14.5)
RBC # FLD: 15.7 % — HIGH (ref 10.3–14.5)
SODIUM SERPL-SCNC: 139 MMOL/L — SIGNIFICANT CHANGE UP (ref 135–145)
WBC # BLD: 9.12 K/UL — SIGNIFICANT CHANGE UP (ref 3.8–10.5)
WBC # BLD: 9.78 K/UL — SIGNIFICANT CHANGE UP (ref 3.8–10.5)
WBC # FLD AUTO: 9.12 K/UL — SIGNIFICANT CHANGE UP (ref 3.8–10.5)
WBC # FLD AUTO: 9.78 K/UL — SIGNIFICANT CHANGE UP (ref 3.8–10.5)

## 2020-02-21 PROCEDURE — 99232 SBSQ HOSP IP/OBS MODERATE 35: CPT

## 2020-02-21 RX ORDER — AMLODIPINE BESYLATE 2.5 MG/1
10 TABLET ORAL DAILY
Refills: 0 | Status: DISCONTINUED | OUTPATIENT
Start: 2020-02-21 | End: 2020-02-26

## 2020-02-21 RX ADMIN — ATORVASTATIN CALCIUM 40 MILLIGRAM(S): 80 TABLET, FILM COATED ORAL at 21:25

## 2020-02-21 RX ADMIN — QUETIAPINE FUMARATE 100 MILLIGRAM(S): 200 TABLET, FILM COATED ORAL at 21:25

## 2020-02-21 RX ADMIN — Medication 125 MICROGRAM(S): at 05:15

## 2020-02-21 RX ADMIN — CEFTRIAXONE 100 MILLIGRAM(S): 500 INJECTION, POWDER, FOR SOLUTION INTRAMUSCULAR; INTRAVENOUS at 21:24

## 2020-02-21 RX ADMIN — Medication 1: at 13:10

## 2020-02-21 RX ADMIN — DONEPEZIL HYDROCHLORIDE 5 MILLIGRAM(S): 10 TABLET, FILM COATED ORAL at 21:25

## 2020-02-21 RX ADMIN — Medication 50 MILLIGRAM(S): at 05:15

## 2020-02-21 RX ADMIN — Medication 1: at 08:44

## 2020-02-21 RX ADMIN — PANTOPRAZOLE SODIUM 40 MILLIGRAM(S): 20 TABLET, DELAYED RELEASE ORAL at 11:49

## 2020-02-21 RX ADMIN — BUDESONIDE AND FORMOTEROL FUMARATE DIHYDRATE 2 PUFF(S): 160; 4.5 AEROSOL RESPIRATORY (INHALATION) at 17:39

## 2020-02-21 RX ADMIN — AMLODIPINE BESYLATE 5 MILLIGRAM(S): 2.5 TABLET ORAL at 05:15

## 2020-02-21 RX ADMIN — BUDESONIDE AND FORMOTEROL FUMARATE DIHYDRATE 2 PUFF(S): 160; 4.5 AEROSOL RESPIRATORY (INHALATION) at 05:16

## 2020-02-21 RX ADMIN — Medication 30 MILLIGRAM(S): at 11:49

## 2020-02-21 NOTE — PROGRESS NOTE ADULT - SUBJECTIVE AND OBJECTIVE BOX
Chief Complaint:  Patient is a 87y old  Female who presents with a chief complaint of LGI bleed (2020 19:18)      Interval Events: No new events. Denies hematochezia , melena, n/v.    Allergies:  Vasotec (Unknown)      Hospital Medications:  ALBUTerol    90 MICROgram(s) HFA Inhaler 2 Puff(s) Inhalation every 6 hours PRN  amLODIPine   Tablet 5 milliGRAM(s) Oral daily  atorvastatin 40 milliGRAM(s) Oral at bedtime  budesonide 160 MICROgram(s)/formoterol 4.5 MICROgram(s) Inhaler 2 Puff(s) Inhalation two times a day  cefTRIAXone   IVPB      cefTRIAXone   IVPB 1000 milliGRAM(s) IV Intermittent every 24 hours  dextrose 40% Gel 15 Gram(s) Oral once PRN  dextrose 50% Injectable 12.5 Gram(s) IV Push once  dextrose 50% Injectable 25 Gram(s) IV Push once  dextrose 50% Injectable 25 Gram(s) IV Push once  donepezil 5 milliGRAM(s) Oral at bedtime  glucagon  Injectable 1 milliGRAM(s) IntraMuscular once PRN  insulin lispro (HumaLOG) corrective regimen sliding scale   SubCutaneous three times a day before meals  insulin lispro (HumaLOG) corrective regimen sliding scale   SubCutaneous at bedtime  levothyroxine 125 MICROGram(s) Oral daily  metoprolol succinate ER 50 milliGRAM(s) Oral daily  pantoprazole  Injectable 40 milliGRAM(s) IV Push daily  PARoxetine 30 milliGRAM(s) Oral daily  QUEtiapine 100 milliGRAM(s) Oral at bedtime      PMHX/PSHX:  Chronic Diastolic Heart Failure  Chronic Obstructive Pulmonary Disease (COPD)  Depression  Dementia  Diabetes Mellitus Type II  History of Hypothyroidism  Hypertension  S/P total B/LKnee Replacement      Family history:  No pertinent family history in first degree relatives      ROS:     General:  No wt loss, fevers, chills, night sweats, fatigue,   Eyes:  Good vision, no reported pain  ENT:  No sore throat, pain, runny nose, dysphagia  CV:  No pain, palpitations, hypo/hypertension  Resp:  No dyspnea, cough, tachypnea, wheezing  GI:  See HPI  :  No pain, bleeding, incontinence, nocturia  Muscle:  No pain, weakness  Neuro:  No weakness, tingling, memory problems  Psych:  No fatigue, insomnia, mood problems, depression  Endocrine:  No polyuria, polydipsia, cold/heat intolerance  Heme:  No petechiae, ecchymosis, easy bruisability  Skin:  No rash, edema      PHYSICAL EXAM:     Vital Signs:  Vital Signs Last 24 Hrs  T(C): 36.7 (2020 04:30), Max: 36.9 (2020 13:29)  T(F): 98.1 (2020 04:30), Max: 98.5 (2020 20:46)  HR: 69 (2020 04:30) (59 - 69)  BP: 167/76 (2020 04:30) (143/87 - 169/75)  BP(mean): --  RR: 18 (2020 04:30) (18 - 18)  SpO2: 100% (2020 04:30) (96% - 100%)  Daily     Daily     GENERAL:  appears comfortable, no acute distress  HEENT:  NC/AT,  conjunctivae clear, sclera -anicteric  CHEST:  no increased effort  HEART:  Regular rate and rhythm  ABDOMEN:  Soft, non-tender, non-distended,  no masses ,no hepato-splenomegaly,   EXTREMITIES:  no cyanosis, clubbing or edema  SKIN:  No rash/erythema/ecchymoses/petechiae/wounds  NEURO:  nonfocal    LABS:                        9.2    9.78  )-----------( 239      ( 2020 06:47 )             29.0     02-21    139  |  103  |  26<H>  ----------------------------<  160<H>  3.7   |  24  |  1.36<H>    Ca    9.1      2020 06:47  Phos  3.4     02-21  Mg     2.0     02-21          Urinalysis Basic - ( 2020 21:15 )    Color: Light Yellow / Appearance: Slightly Turbid / S.012 / pH: x  Gluc: x / Ketone: Negative  / Bili: Negative / Urobili: Negative   Blood: x / Protein: Trace / Nitrite: Negative   Leuk Esterase: Large / RBC: 1 /hpf / WBC 83 /HPF   Sq Epi: x / Non Sq Epi: 2 /hpf / Bacteria: Moderate          Imaging:

## 2020-02-21 NOTE — PROGRESS NOTE ADULT - ASSESSMENT
GIB  fu with GI     HTN  BP increasing   outpt meds resumed yesterday   monitor     Diastolic CHF   chronic   stable     CKD  pt follow up with renal   stable

## 2020-02-21 NOTE — PROGRESS NOTE ADULT - SUBJECTIVE AND OBJECTIVE BOX
Patient is a 87y old  Female who presents with a chief complaint of LGI bleed (21 Feb 2020 14:24)      SUBJECTIVE / OVERNIGHT EVENTS: HH remains stable, no further bloody BMs, on ABx for UTI    MEDICATIONS  (STANDING):  amLODIPine   Tablet 5 milliGRAM(s) Oral daily  atorvastatin 40 milliGRAM(s) Oral at bedtime  budesonide 160 MICROgram(s)/formoterol 4.5 MICROgram(s) Inhaler 2 Puff(s) Inhalation two times a day  cefTRIAXone   IVPB      cefTRIAXone   IVPB 1000 milliGRAM(s) IV Intermittent every 24 hours  dextrose 50% Injectable 12.5 Gram(s) IV Push once  dextrose 50% Injectable 25 Gram(s) IV Push once  dextrose 50% Injectable 25 Gram(s) IV Push once  donepezil 5 milliGRAM(s) Oral at bedtime  insulin lispro (HumaLOG) corrective regimen sliding scale   SubCutaneous three times a day before meals  insulin lispro (HumaLOG) corrective regimen sliding scale   SubCutaneous at bedtime  levothyroxine 125 MICROGram(s) Oral daily  metoprolol succinate ER 50 milliGRAM(s) Oral daily  pantoprazole  Injectable 40 milliGRAM(s) IV Push daily  PARoxetine 30 milliGRAM(s) Oral daily  QUEtiapine 100 milliGRAM(s) Oral at bedtime    MEDICATIONS  (PRN):  ALBUTerol    90 MICROgram(s) HFA Inhaler 2 Puff(s) Inhalation every 6 hours PRN Bronchospasm  dextrose 40% Gel 15 Gram(s) Oral once PRN Blood Glucose LESS THAN 70 milliGRAM(s)/deciliter  glucagon  Injectable 1 milliGRAM(s) IntraMuscular once PRN Glucose LESS THAN 70 milligrams/deciliter      Vital Signs Last 24 Hrs  T(F): 98 (02-21-20 @ 21:05), Max: 98.3 (02-21-20 @ 00:11)  HR: 62 (02-21-20 @ 21:05) (58 - 69)  BP: 185/90 (02-21-20 @ 21:06) (161/82 - 190/77)  RR: 18 (02-21-20 @ 21:05) (18 - 18)  SpO2: 100% (02-21-20 @ 21:05) (96% - 100%)  Telemetry:   CAPILLARY BLOOD GLUCOSE      POCT Blood Glucose.: 198 mg/dL (21 Feb 2020 21:05)  POCT Blood Glucose.: 131 mg/dL (21 Feb 2020 17:48)  POCT Blood Glucose.: 173 mg/dL (21 Feb 2020 13:05)  POCT Blood Glucose.: 163 mg/dL (21 Feb 2020 08:36)    I&O's Summary    20 Feb 2020 07:01  -  21 Feb 2020 07:00  --------------------------------------------------------  IN: 480 mL / OUT: 0 mL / NET: 480 mL        PHYSICAL EXAM:  GENERAL: NAD, well-developed  HEAD:  Atraumatic, Normocephalic  EYES: EOMI, PERRLA, conjunctiva and sclera clear  NECK: Supple, No JVD  CHEST/LUNG: Clear to auscultation bilaterally; No wheeze  HEART: Regular rate and rhythm; No murmurs, rubs, or gallops  ABDOMEN: Soft, Nontender, Nondistended; Bowel sounds present  EXTREMITIES:  2+ Peripheral Pulses, No clubbing, cyanosis, or edema  PSYCH: AAOx3  NEUROLOGY: non-focal  SKIN: No rashes or lesions    LABS:                        8.7    9.12  )-----------( 271      ( 21 Feb 2020 21:23 )             27.2     02-21    139  |  103  |  26<H>  ----------------------------<  160<H>  3.7   |  24  |  1.36<H>    Ca    9.1      21 Feb 2020 06:47  Phos  3.4     02-21  Mg     2.0     02-21                RADIOLOGY & ADDITIONAL TESTS:    Imaging Personally Reviewed:    Consultant(s) Notes Reviewed:      Care Discussed with Consultants/Other Providers:

## 2020-02-21 NOTE — PROGRESS NOTE ADULT - ASSESSMENT
86 yo hx of breast CA, HTN, COPD, T2DM, diastolic CHF, hypothyroidism, p/w 1 episode of BRBPR; daughter notes she was helping patient clean up around 2h PTA and noted blood with clots on toilet paper and a small amount in the toilet bowl. She had 2 more episodes while she was in ER with blood mixed with stool.     # Hematochezia with long segment wall thickening involving the proximal sigmoid and descending colon favoring colitis and extensive diverticular disease: Hgb stable. Pt is afebrile, no overt bleeding, hgb stable (suspect 9.6 hgb was a lab error, likely 8 is normal range given trend and clinical picture)   DDx: diverticular bleed, ischemic or infectious colitis   # Dementia   # hx of breast CA  # diastolic CHF    Recommendations:  - continue with antibiotics for 5 days, consider adding flagyl  - monitor CBC and abdominal exam  - diet as tolerated  - no plan for GI interventions at this time  - supportive care 86 yo hx of breast CA, HTN, COPD, T2DM, diastolic CHF, hypothyroidism, p/w 1 episode of BRBPR; daughter notes she was helping patient clean up around 2h PTA and noted blood with clots on toilet paper and a small amount in the toilet bowl. She had 2 more episodes while she was in ER with blood mixed with stool.     # Hematochezia with long segment wall thickening involving the proximal sigmoid and descending colon favoring colitis and extensive diverticular disease: Hgb stable. Pt is afebrile, no overt bleeding, hgb stable (suspect 9.6 hgb was a lab error, likely 8 is normal range given trend and clinical picture)   DDx: diverticular bleed, ischemic or infectious colitis   # Dementia   # hx of breast CA  # diastolic CHF    Recommendations:  - continue with antibiotics for 5 days  - monitor CBC and abdominal exam  - diet as tolerated  - no plan for GI interventions at this time  - supportive care

## 2020-02-21 NOTE — PROGRESS NOTE ADULT - ASSESSMENT
86 yo woman presents w large volume maroon bloody BMs w large amount of clots, HH was 8.5/28 on admission, rpt was 6, received total 2 Units PRBC, maintained a stable BP, though was orthostatic,   hematochezia stopped on 2/20 but had dark stools then, today no further dark stools  HH remains stable  good po intake  BP is elevated, resume all  her outptn anti HTN meds,   uti e GNR, day 3 iV CTX  DVT ppx w PAS

## 2020-02-21 NOTE — PROGRESS NOTE ADULT - SUBJECTIVE AND OBJECTIVE BOX
Subjective: Patient seen and examined. No new events except as noted.     SUBJECTIVE/ROS:  No chest pain, dyspnea, palpitation, or dizziness.       MEDICATIONS:  MEDICATIONS  (STANDING):  amLODIPine   Tablet 5 milliGRAM(s) Oral daily  atorvastatin 40 milliGRAM(s) Oral at bedtime  budesonide 160 MICROgram(s)/formoterol 4.5 MICROgram(s) Inhaler 2 Puff(s) Inhalation two times a day  cefTRIAXone   IVPB      cefTRIAXone   IVPB 1000 milliGRAM(s) IV Intermittent every 24 hours  dextrose 50% Injectable 12.5 Gram(s) IV Push once  dextrose 50% Injectable 25 Gram(s) IV Push once  dextrose 50% Injectable 25 Gram(s) IV Push once  donepezil 5 milliGRAM(s) Oral at bedtime  insulin lispro (HumaLOG) corrective regimen sliding scale   SubCutaneous three times a day before meals  insulin lispro (HumaLOG) corrective regimen sliding scale   SubCutaneous at bedtime  levothyroxine 125 MICROGram(s) Oral daily  metoprolol succinate ER 50 milliGRAM(s) Oral daily  pantoprazole  Injectable 40 milliGRAM(s) IV Push daily  PARoxetine 30 milliGRAM(s) Oral daily  QUEtiapine 100 milliGRAM(s) Oral at bedtime      PHYSICAL EXAM:  T(C): 36.5 (02-21-20 @ 08:27), Max: 36.9 (02-20-20 @ 13:29)  HR: 60 (02-21-20 @ 08:27) (59 - 69)  BP: 177/88 (02-21-20 @ 08:27) (143/87 - 177/88)  RR: 18 (02-21-20 @ 08:27) (18 - 18)  SpO2: 96% (02-21-20 @ 08:27) (96% - 100%)  Wt(kg): --  I&O's Summary    20 Feb 2020 07:01  -  21 Feb 2020 07:00  --------------------------------------------------------  IN: 480 mL / OUT: 0 mL / NET: 480 mL            JVP: Normal  Neck: supple  Lung: clear   CV: S1 S2 , Murmur:  Abd: soft  Ext: No edema  neuro: Awake / alert  Psych: flat affect  Skin: normal``    LABS/DATA:    CARDIAC MARKERS:                                9.2    9.78  )-----------( 239      ( 21 Feb 2020 06:47 )             29.0     02-21    139  |  103  |  26<H>  ----------------------------<  160<H>  3.7   |  24  |  1.36<H>    Ca    9.1      21 Feb 2020 06:47  Phos  3.4     02-21  Mg     2.0     02-21      proBNP:   Lipid Profile:   HgA1c:   TSH:     TELE:  EKG:

## 2020-02-21 NOTE — PROGRESS NOTE ADULT - ATTENDING COMMENTS
Thank you for allowing me to participate in the care of this patient.
Improved abdominal discomfort with benign exam today  No BM x 1 day, Tolerating some PO  H&H stable  GI to follow  Deferring endoscopic evaluation per family wishes at this time
Discussed again with family role for endoscopic assessment - they decline at this time pending discussion with multiple family members  s/p 1 unit prbc today  Some mild tenderness RLQ, but no rebound or rigidity  Please check lactate in the AM with labs  Would consider abx for empiric ischemic colitis treatment given age and distribution as we cannot confirm or reject the diagnosis  GI to follow
Doing well  Benign Exam  No blood in stools  Can give miralax if consipated  No endoscopy per family request  Agree with completing course of abx  Planned for d/c today - no contraindication from Gi standpoint at this point

## 2020-02-21 NOTE — PROGRESS NOTE ADULT - SUBJECTIVE AND OBJECTIVE BOX
Infectious Diseases progress note:    Subjective: No acute o/n events.  No diarrhea or dark stools today.  Afebrile.  Daughter at bedside.     ROS:  CONSTITUTIONAL:  No fever, chills, rigors  CARDIOVASCULAR:  No chest pain or palpitations  RESPIRATORY:   No SOB, cough, dyspnea on exertion.  No wheezing  GASTROINTESTINAL:  No abd pain, N/V, diarrhea/constipation  EXTREMITIES:  No swelling or joint pain  GENITOURINARY:  No burning on urination, increased frequency or urgency.  No flank pain  NEUROLOGIC:  No HA, visual disturbances  SKIN: No rashes    Allergies    Vasotec (Unknown)    Intolerances        ANTIBIOTICS/RELEVANT:  antimicrobials  cefTRIAXone   IVPB      cefTRIAXone   IVPB 1000 milliGRAM(s) IV Intermittent every 24 hours    immunologic:    OTHER:  ALBUTerol    90 MICROgram(s) HFA Inhaler 2 Puff(s) Inhalation every 6 hours PRN  amLODIPine   Tablet 5 milliGRAM(s) Oral daily  atorvastatin 40 milliGRAM(s) Oral at bedtime  budesonide 160 MICROgram(s)/formoterol 4.5 MICROgram(s) Inhaler 2 Puff(s) Inhalation two times a day  dextrose 40% Gel 15 Gram(s) Oral once PRN  dextrose 50% Injectable 12.5 Gram(s) IV Push once  dextrose 50% Injectable 25 Gram(s) IV Push once  dextrose 50% Injectable 25 Gram(s) IV Push once  donepezil 5 milliGRAM(s) Oral at bedtime  glucagon  Injectable 1 milliGRAM(s) IntraMuscular once PRN  insulin lispro (HumaLOG) corrective regimen sliding scale   SubCutaneous three times a day before meals  insulin lispro (HumaLOG) corrective regimen sliding scale   SubCutaneous at bedtime  levothyroxine 125 MICROGram(s) Oral daily  metoprolol succinate ER 50 milliGRAM(s) Oral daily  pantoprazole  Injectable 40 milliGRAM(s) IV Push daily  PARoxetine 30 milliGRAM(s) Oral daily  QUEtiapine 100 milliGRAM(s) Oral at bedtime      Objective:  Vital Signs Last 24 Hrs  T(C): 36.6 (2020 12:25), Max: 36.9 (2020 20:46)  T(F): 97.8 (2020 12:25), Max: 98.5 (2020 20:46)  HR: 60 (2020 12:25) (60 - 69)  BP: 183/82 (2020 12:25) (156/79 - 183/82)  BP(mean): --  RR: 18 (2020 12:25) (18 - 18)  SpO2: 100% (2020 12:25) (96% - 100%)    PHYSICAL EXAM:  Constitutional:NAD  Eyes:CAROLINE, EOMI  Ear/Nose/Throat: no thrush, mucositis.  Moist mucous membranes	  Neck:no JVD, no lymphadenopathy, supple  Respiratory: CTA bren  Cardiovascular: S1S2 RRR, no murmurs  Gastrointestinal:soft, nontender,  nondistended (+) BS  Extremities:no e/e/c  Skin:  no rashes, open wounds or ulcerations        LABS:                        9.2    9.78  )-----------( 239      ( 2020 06:47 )             29.0     02-    139  |  103  |  26<H>  ----------------------------<  160<H>  3.7   |  24  |  1.36<H>    Ca    9.1      2020 06:47  Phos  3.4       Mg     2.0             Urinalysis Basic - ( 2020 21:15 )    Color: Light Yellow / Appearance: Slightly Turbid / S.012 / pH: x  Gluc: x / Ketone: Negative  / Bili: Negative / Urobili: Negative   Blood: x / Protein: Trace / Nitrite: Negative   Leuk Esterase: Large / RBC: 1 /hpf / WBC 83 /HPF   Sq Epi: x / Non Sq Epi: 2 /hpf / Bacteria: Moderate                          MICROBIOLOGY:    Culture - Urine (20 @ 01:25)    Specimen Source: .Urine Clean Catch (Midstream)    Culture Results:   >100,000 CFU/ml Gram Negative Rods    GI PCR Panel, Stool (20 @ 00:35)    Culture Results:   GI PCR Results: NOT detected  *******Please Note:*******  GI panel PCR evaluates for:  Campylobacter, Plesiomonas shigelloides, Salmonella,  Vibrio, Yersinia enterocolitica, Enteroaggregative  Escherichia coli (EAEC), Enteropathogenic E.coli (EPEC),  Enterotoxigenic E. coli (ETEC) lt/st, Shiga-like  toxin-producing E. coli (STEC) stx1/stx2,  Shigella/ Enteroinvasive E. coli (EIEC), Cryptosporidium,  Cyclospora cayetanensis, Entamoeba histolytica,  Giardia lamblia, Adenovirus F 40/41, Astrovirus,  Norovirus GI/GII, Rotavirus A, Sapovirus            RADIOLOGY & ADDITIONAL STUDIES:    < from: Xray Chest 1 View- PORTABLE-Urgent (20 @ 17:16) >  FINDINGS:  The lungs are clear. There is no pleural effusion or pneumothorax.  The heart size is not well evaluated on this projection. Stable elevation of the right hemidiaphragm.  The visualized osseous and soft tissue structures demonstrate no acute pathology.    IMPRESSION:   Clear lungs.    < end of copied text >        < from: Xray Chest 1 View- PORTABLE-Urgent (20 @ 01:03) >  FINDINGS:  Clear lungs. No pleural effusion or pneumothorax.  Stable elevation of the right hemidiaphragm. Heart size is normal evaluate on this projection.  No acute osseous abnormality.    IMPRESSION:   Clear lungs.    < end of copied text >        < from: CT Abdomen and Pelvis w/ IV Cont (20 @ 10:16) >  FINDINGS:    LOWER CHEST: Cardiomegaly.    LIVER: Withinnormal limits.  BILE DUCTS: Normal caliber.  GALLBLADDER: Within normal limits.  SPLEEN: Within normal limits.  PANCREAS: Within normal limits.  ADRENALS: Within normal limits.  KIDNEYS/URETERS: Bilateral subcentimeter hypodense foci, too small to characterize. No hydronephrosis.    BLADDER: Diffuse wall thickening.  REPRODUCTIVE ORGANS: Uterus and adnexa within normal limits.    BOWEL: Small hiatal hernia. No bowel obstruction. Appendix is not visualized. Colonic diverticulosis without diverticulitis. Mild colonic wall thickening involving the proximal sigmoid and descending colon.  PERITONEUM: No drainable fluid collection.  VESSELS: Atherosclerotic changes.  RETROPERITONEUM/LYMPH NODES: No lymphadenopathy.    ABDOMINAL WALL: Small fat-containing umbilical hernia.  BONES: Unchanged sclerotic focus in the L1 vertebral body. Degenerative changes.    IMPRESSION:     Longer segment wall thickening involving the proximal sigmoid and descending colon favoring colitis.    Extensive diverticular disease.    Diffuse bladder wall thickening; correlate with urinalysis for underlying cystitis.    < end of copied text >

## 2020-02-21 NOTE — PROGRESS NOTE ADULT - ASSESSMENT
88 yo hx of breast CA, dementia,  HTN, COPD, T2DM, diastolic CHF, hypothyroidism, developed BRBPR this am, daughter notes she was helping patient clean up around 6 am and noted blood with clots on toilet paper and a small amount in the toilet bowl. Daughter denies pt reporting CP, SOB, or dizziness. Ptn has baseline dementia, appears confortable and denies any discomfort. pale appearing, Ptn had LGI bleed 5 years ago but daughter unclear about the results. (17 Feb 2020 15:54)    Pt afebrile, normal WBC.  Lact 2.4.  UA large LE and pyuria.  Thus far stools studies:  Cdiff (-) and GI pcr (-).  Ct ap shows longer segment wall thickening involving the proximal sigmoid and descending colon favoring colitis, extensive diverticular disease and diffuse bladder wall thickening; correlate with urinalysis for underlying cystitis.  Pt is on rocephin.     L sided colitis:    - Pt with descending colitis with BRBRP.  Thus far Cdiff neg.  GI pcr neg.  Suspect ischemic >> infectious colitis.  No fever or leukocytosis at present.  No abd pain today.     - Advance diet as tolerated.  GI following, no further intervention at this time.     - Monitor H/H, transfuse PRN    - Continue to monitor h&h and transfuse if hgb drop below 7        - Monitor stool outpt, serial abd exams.      Cystitis/Uti:    - UA large LE with pyuria.  Diffuse bladder wall thickening on CTap.  f/u urine cx - growing >100K GNR.      - Cont rocephin for now.      Will follow,  d/w daughter at bedside.     Sharita Mishra  [phone]

## 2020-02-22 LAB
-  AMIKACIN: SIGNIFICANT CHANGE UP
-  AMPICILLIN/SULBACTAM: SIGNIFICANT CHANGE UP
-  AMPICILLIN: SIGNIFICANT CHANGE UP
-  AZTREONAM: SIGNIFICANT CHANGE UP
-  CEFAZOLIN: SIGNIFICANT CHANGE UP
-  CEFEPIME: SIGNIFICANT CHANGE UP
-  CEFOXITIN: SIGNIFICANT CHANGE UP
-  CEFTRIAXONE: SIGNIFICANT CHANGE UP
-  CIPROFLOXACIN: SIGNIFICANT CHANGE UP
-  GENTAMICIN: SIGNIFICANT CHANGE UP
-  LEVOFLOXACIN: SIGNIFICANT CHANGE UP
-  MEROPENEM: SIGNIFICANT CHANGE UP
-  NITROFURANTOIN: SIGNIFICANT CHANGE UP
-  PIPERACILLIN/TAZOBACTAM: SIGNIFICANT CHANGE UP
-  TOBRAMYCIN: SIGNIFICANT CHANGE UP
-  TRIMETHOPRIM/SULFAMETHOXAZOLE: SIGNIFICANT CHANGE UP
ANION GAP SERPL CALC-SCNC: 11 MMOL/L — SIGNIFICANT CHANGE UP (ref 5–17)
BUN SERPL-MCNC: 24 MG/DL — HIGH (ref 7–23)
CALCIUM SERPL-MCNC: 8.8 MG/DL — SIGNIFICANT CHANGE UP (ref 8.4–10.5)
CHLORIDE SERPL-SCNC: 102 MMOL/L — SIGNIFICANT CHANGE UP (ref 96–108)
CO2 SERPL-SCNC: 24 MMOL/L — SIGNIFICANT CHANGE UP (ref 22–31)
CREAT SERPL-MCNC: 1.66 MG/DL — HIGH (ref 0.5–1.3)
CULTURE RESULTS: SIGNIFICANT CHANGE UP
GLUCOSE BLDC GLUCOMTR-MCNC: 147 MG/DL — HIGH (ref 70–99)
GLUCOSE BLDC GLUCOMTR-MCNC: 150 MG/DL — HIGH (ref 70–99)
GLUCOSE BLDC GLUCOMTR-MCNC: 162 MG/DL — HIGH (ref 70–99)
GLUCOSE BLDC GLUCOMTR-MCNC: 240 MG/DL — HIGH (ref 70–99)
GLUCOSE SERPL-MCNC: 164 MG/DL — HIGH (ref 70–99)
HCT VFR BLD CALC: 26.9 % — LOW (ref 34.5–45)
HGB BLD-MCNC: 8.3 G/DL — LOW (ref 11.5–15.5)
MCHC RBC-ENTMCNC: 26.7 PG — LOW (ref 27–34)
MCHC RBC-ENTMCNC: 30.9 GM/DL — LOW (ref 32–36)
MCV RBC AUTO: 86.5 FL — SIGNIFICANT CHANGE UP (ref 80–100)
METHOD TYPE: SIGNIFICANT CHANGE UP
NRBC # BLD: 0 /100 WBCS — SIGNIFICANT CHANGE UP (ref 0–0)
ORGANISM # SPEC MICROSCOPIC CNT: SIGNIFICANT CHANGE UP
ORGANISM # SPEC MICROSCOPIC CNT: SIGNIFICANT CHANGE UP
PLATELET # BLD AUTO: 236 K/UL — SIGNIFICANT CHANGE UP (ref 150–400)
POTASSIUM SERPL-MCNC: 3.9 MMOL/L — SIGNIFICANT CHANGE UP (ref 3.5–5.3)
POTASSIUM SERPL-SCNC: 3.9 MMOL/L — SIGNIFICANT CHANGE UP (ref 3.5–5.3)
RBC # BLD: 3.11 M/UL — LOW (ref 3.8–5.2)
RBC # FLD: 15.8 % — HIGH (ref 10.3–14.5)
SODIUM SERPL-SCNC: 137 MMOL/L — SIGNIFICANT CHANGE UP (ref 135–145)
SPECIMEN SOURCE: SIGNIFICANT CHANGE UP
WBC # BLD: 8.93 K/UL — SIGNIFICANT CHANGE UP (ref 3.8–10.5)
WBC # FLD AUTO: 8.93 K/UL — SIGNIFICANT CHANGE UP (ref 3.8–10.5)

## 2020-02-22 RX ADMIN — Medication 2: at 13:11

## 2020-02-22 RX ADMIN — BUDESONIDE AND FORMOTEROL FUMARATE DIHYDRATE 2 PUFF(S): 160; 4.5 AEROSOL RESPIRATORY (INHALATION) at 17:40

## 2020-02-22 RX ADMIN — CEFTRIAXONE 100 MILLIGRAM(S): 500 INJECTION, POWDER, FOR SOLUTION INTRAMUSCULAR; INTRAVENOUS at 23:50

## 2020-02-22 RX ADMIN — Medication 1: at 17:36

## 2020-02-22 RX ADMIN — DONEPEZIL HYDROCHLORIDE 5 MILLIGRAM(S): 10 TABLET, FILM COATED ORAL at 22:07

## 2020-02-22 RX ADMIN — Medication 50 MILLIGRAM(S): at 05:22

## 2020-02-22 RX ADMIN — PANTOPRAZOLE SODIUM 40 MILLIGRAM(S): 20 TABLET, DELAYED RELEASE ORAL at 14:02

## 2020-02-22 RX ADMIN — Medication 125 MICROGRAM(S): at 05:22

## 2020-02-22 RX ADMIN — ATORVASTATIN CALCIUM 40 MILLIGRAM(S): 80 TABLET, FILM COATED ORAL at 22:07

## 2020-02-22 RX ADMIN — AMLODIPINE BESYLATE 10 MILLIGRAM(S): 2.5 TABLET ORAL at 05:22

## 2020-02-22 RX ADMIN — Medication 30 MILLIGRAM(S): at 13:11

## 2020-02-22 RX ADMIN — QUETIAPINE FUMARATE 100 MILLIGRAM(S): 200 TABLET, FILM COATED ORAL at 22:07

## 2020-02-22 RX ADMIN — Medication 0.1 MILLIGRAM(S): at 05:22

## 2020-02-22 RX ADMIN — BUDESONIDE AND FORMOTEROL FUMARATE DIHYDRATE 2 PUFF(S): 160; 4.5 AEROSOL RESPIRATORY (INHALATION) at 05:22

## 2020-02-22 NOTE — PROGRESS NOTE ADULT - ASSESSMENT
88 yo woman presents w large volume maroon bloody BMs w large amount of clots, HH was 8.5/28 on admission, rpt was 6, received total 2 Units PRBC, maintained a stable BP, though was orthostatic,   hematochezia stopped on 2/20 but has had dark stools off and on since,  HH remains stable  good po intake  BP has improved on all her anti HTN meds,   uti w GNR: proteus: day 4/7  iV CTX  DVT ppx w PAS

## 2020-02-22 NOTE — PROGRESS NOTE ADULT - ASSESSMENT
ASSESSMENT/PLAN  ASSESSMENT:   Ms. Alejandra is an 86 yo lady with PMH of  breast CA, HTN, COPD, T2DM, diastolic CHF, hypothyroidism, presented with 1 episode of BRBPR; daughter notes she was helping patient clean up around 2h PTA and noted blood with clots on toilet paper and a small amount in the toilet bowl. She had 2 more episodes while she was in ER with blood mixed with stool. She now is having more of clots coming out. Patient is unable to provide history due to her baseline dementia No fever or chills were reported. No abdominal pain. Nephrology consulted for CKD stage 3.     1. CKD stage 3. Her Scr decreased to 1.35 mg/dl as she is eating or drinking.   2. Non anion gap metabolic acidosis.   3. Intravascularly depleted.   4. Acute blood loss anemia. Hemoglobin is 8.7   5. HTN.   6. Diffuse wall thickening with possible cystitis.   7. GI Bleed resolving.     RECOMMEND:  - Hold diuretics and IVF.   - Urine culture.    - Avoid NSAIDs and nephrotoxins.   - Renal dose all medication for GFR < 45 ml/min.   - Give blood transfusion for Hemoglobin < 7.0.   - BMP, CBC, Magnesium and phosphorus daily.  - Conservative treatment.          Erika Valencia, DO  [facility]  [phone]

## 2020-02-22 NOTE — PROGRESS NOTE ADULT - ASSESSMENT
GIB  fu with GI     HTN  stable     Diastolic CHF   chronic   stable     CKD  pt follow up with renal   stable

## 2020-02-22 NOTE — PROVIDER CONTACT NOTE (OTHER) - RECOMMENDATIONS
Clarify order request
Notify provider, Meds required?
Phlebotomy to draw labs, IV RN to attempt later this AM?

## 2020-02-22 NOTE — PROGRESS NOTE ADULT - SUBJECTIVE AND OBJECTIVE BOX
Infectious Diseases progress note:    Subjective: Pt having bowel movement, some black tarry stool reported as per daughter.  No fever/chills/abd pain    ROS:  CONSTITUTIONAL:  No fever, chills, rigors  CARDIOVASCULAR:  No chest pain or palpitations  RESPIRATORY:   No SOB, cough, dyspnea on exertion.  No wheezing  GASTROINTESTINAL:  No abd pain, N/V, diarrhea/constipation  EXTREMITIES:  No swelling or joint pain  GENITOURINARY:  No burning on urination, increased frequency or urgency.  No flank pain  NEUROLOGIC:  No HA, visual disturbances  SKIN: No rashes    Allergies    Vasotec (Unknown)    Intolerances        ANTIBIOTICS/RELEVANT:  antimicrobials  cefTRIAXone   IVPB      cefTRIAXone   IVPB 1000 milliGRAM(s) IV Intermittent every 24 hours    immunologic:    OTHER:  ALBUTerol    90 MICROgram(s) HFA Inhaler 2 Puff(s) Inhalation every 6 hours PRN  amLODIPine   Tablet 10 milliGRAM(s) Oral daily  atorvastatin 40 milliGRAM(s) Oral at bedtime  budesonide 160 MICROgram(s)/formoterol 4.5 MICROgram(s) Inhaler 2 Puff(s) Inhalation two times a day  cloNIDine 0.1 milliGRAM(s) Oral daily  dextrose 40% Gel 15 Gram(s) Oral once PRN  dextrose 50% Injectable 12.5 Gram(s) IV Push once  dextrose 50% Injectable 25 Gram(s) IV Push once  dextrose 50% Injectable 25 Gram(s) IV Push once  donepezil 5 milliGRAM(s) Oral at bedtime  glucagon  Injectable 1 milliGRAM(s) IntraMuscular once PRN  insulin lispro (HumaLOG) corrective regimen sliding scale   SubCutaneous three times a day before meals  insulin lispro (HumaLOG) corrective regimen sliding scale   SubCutaneous at bedtime  levothyroxine 125 MICROGram(s) Oral daily  metoprolol succinate ER 50 milliGRAM(s) Oral daily  pantoprazole  Injectable 40 milliGRAM(s) IV Push daily  PARoxetine 30 milliGRAM(s) Oral daily  QUEtiapine 100 milliGRAM(s) Oral at bedtime      Objective:  Vital Signs Last 24 Hrs  T(C): 36.7 (22 Feb 2020 13:32), Max: 36.7 (21 Feb 2020 21:05)  T(F): 98 (22 Feb 2020 13:32), Max: 98 (21 Feb 2020 21:05)  HR: 89 (22 Feb 2020 13:32) (56 - 89)  BP: 136/79 (22 Feb 2020 13:32) (136/79 - 190/77)  BP(mean): --  RR: 18 (22 Feb 2020 13:32) (18 - 18)  SpO2: 96% (22 Feb 2020 13:32) (96% - 100%)    PHYSICAL EXAM:  Constitutional:NAD  Eyes:CAROLINE, EOMI  Ear/Nose/Throat: no thrush, mucositis.  Moist mucous membranes	  Neck:no JVD, no lymphadenopathy, supple  Respiratory: CTA bren  Cardiovascular: S1S2 RRR, no murmurs  Gastrointestinal:soft, nontender,  nondistended (+) BS  Extremities:no e/e/c  Skin:  no rashes, open wounds or ulcerations        LABS:                        8.3    8.93  )-----------( 236      ( 22 Feb 2020 08:21 )             26.9     02-22    137  |  102  |  24<H>  ----------------------------<  164<H>  3.9   |  24  |  1.66<H>    Ca    8.8      22 Feb 2020 08:20  Phos  3.4     02-21  Mg     2.0     02-21                MICROBIOLOGY:      Culture - Urine (02.20.20 @ 01:25)    -  Amikacin: S <=16    -  Ampicillin: R >16 These ampicillin results predict results for amoxicillin    -  Ampicillin/Sulbactam: S <=8/4 Enterobacter, Citrobacter, and Serratia may develop resistance during prolonged therapy (3-4 days)    -  Aztreonam: S <=4    -  Cefazolin: S <=8 (MIC_CL_COM_ENTERIC_CEFAZU) For uncomplicated UTI with K. pneumoniae, E. coli, or P. mirablis: MOR <=16 is sensitive and MOR >=32 is resistant. This also predicts results for oral agents cefaclor, cefdinir, cefpodoxime, cefprozil, cefuroxime axetil, cephalexin and locarbef for uncomplicated UTI. Note that some isolates may be susceptible to these agents while testing resistant to cefazolin.    -  Cefepime: S <=4    -  Cefoxitin: S <=8    -  Ceftriaxone: S <=1 Enterobacter, Citrobacter, and Serratia may develop resistance during prolonged therapy    -  Ciprofloxacin: R >2    -  Gentamicin: S <=4    -  Levofloxacin: R >4    -  Meropenem: S <=1    -  Nitrofurantoin: R 64 Should not be used to treat pyelonephritis    -  Piperacillin/Tazobactam: S <=16    -  Tobramycin: S <=4    -  Trimethoprim/Sulfamethoxazole: R >2/38    Specimen Source: .Urine Clean Catch (Midstream)    Culture Results:   >100,000 CFU/ml Proteus mirabilis    Organism Identification: Proteus mirabilis    Organism: Proteus mirabilis    Method Type: MOR        RADIOLOGY & ADDITIONAL STUDIES:

## 2020-02-22 NOTE — PROGRESS NOTE ADULT - SUBJECTIVE AND OBJECTIVE BOX
Patient is a 87y old  Female who presents with a chief complaint of LGI bleed (22 Feb 2020 19:45)      SUBJECTIVE / OVERNIGHT EVENTS: no further BM today, HH stable.     MEDICATIONS  (STANDING):  amLODIPine   Tablet 10 milliGRAM(s) Oral daily  atorvastatin 40 milliGRAM(s) Oral at bedtime  budesonide 160 MICROgram(s)/formoterol 4.5 MICROgram(s) Inhaler 2 Puff(s) Inhalation two times a day  cefTRIAXone   IVPB      cefTRIAXone   IVPB 1000 milliGRAM(s) IV Intermittent every 24 hours  cloNIDine 0.1 milliGRAM(s) Oral daily  dextrose 50% Injectable 12.5 Gram(s) IV Push once  dextrose 50% Injectable 25 Gram(s) IV Push once  dextrose 50% Injectable 25 Gram(s) IV Push once  donepezil 5 milliGRAM(s) Oral at bedtime  insulin lispro (HumaLOG) corrective regimen sliding scale   SubCutaneous three times a day before meals  insulin lispro (HumaLOG) corrective regimen sliding scale   SubCutaneous at bedtime  levothyroxine 125 MICROGram(s) Oral daily  metoprolol succinate ER 50 milliGRAM(s) Oral daily  pantoprazole  Injectable 40 milliGRAM(s) IV Push daily  PARoxetine 30 milliGRAM(s) Oral daily  QUEtiapine 100 milliGRAM(s) Oral at bedtime    MEDICATIONS  (PRN):  ALBUTerol    90 MICROgram(s) HFA Inhaler 2 Puff(s) Inhalation every 6 hours PRN Bronchospasm  dextrose 40% Gel 15 Gram(s) Oral once PRN Blood Glucose LESS THAN 70 milliGRAM(s)/deciliter  glucagon  Injectable 1 milliGRAM(s) IntraMuscular once PRN Glucose LESS THAN 70 milligrams/deciliter      Vital Signs Last 24 Hrs  T(F): 98.2 (02-22-20 @ 20:06), Max: 98.2 (02-22-20 @ 20:06)  HR: 90 (02-22-20 @ 20:06) (56 - 90)  BP: 117/76 (02-22-20 @ 20:06) (117/76 - 190/77)  RR: 18 (02-22-20 @ 20:06) (18 - 18)  SpO2: 98% (02-22-20 @ 20:06) (96% - 100%)  Telemetry:   CAPILLARY BLOOD GLUCOSE      POCT Blood Glucose.: 162 mg/dL (22 Feb 2020 17:28)  POCT Blood Glucose.: 240 mg/dL (22 Feb 2020 12:59)  POCT Blood Glucose.: 147 mg/dL (22 Feb 2020 09:18)  POCT Blood Glucose.: 198 mg/dL (21 Feb 2020 21:05)    I&O's Summary      PHYSICAL EXAM:  GENERAL: NAD, well-developed  HEAD:  Atraumatic, Normocephalic  EYES: EOMI, PERRLA, conjunctiva and sclera clear  NECK: Supple, No JVD  CHEST/LUNG: Clear to auscultation bilaterally; No wheeze  HEART: Regular rate and rhythm; No murmurs, rubs, or gallops  ABDOMEN: Soft, Nontender, Nondistended; Bowel sounds present  EXTREMITIES:  2+ Peripheral Pulses, No clubbing, cyanosis, or edema  PSYCH: AAOx3  NEUROLOGY: non-focal  SKIN: No rashes or lesions    LABS:                        8.3    8.93  )-----------( 236      ( 22 Feb 2020 08:21 )             26.9     02-22    137  |  102  |  24<H>  ----------------------------<  164<H>  3.9   |  24  |  1.66<H>    Ca    8.8      22 Feb 2020 08:20  Phos  3.4     02-21  Mg     2.0     02-21                RADIOLOGY & ADDITIONAL TESTS:    Imaging Personally Reviewed:    Consultant(s) Notes Reviewed:      Care Discussed with Consultants/Other Providers:

## 2020-02-22 NOTE — PROGRESS NOTE ADULT - ASSESSMENT
86 yo hx of breast CA, dementia,  HTN, COPD, T2DM, diastolic CHF, hypothyroidism, developed BRBPR this am, daughter notes she was helping patient clean up around 6 am and noted blood with clots on toilet paper and a small amount in the toilet bowl. Daughter denies pt reporting CP, SOB, or dizziness. Ptn has baseline dementia, appears confortable and denies any discomfort. pale appearing, Ptn had LGI bleed 5 years ago but daughter unclear about the results. (17 Feb 2020 15:54)    Pt afebrile, normal WBC.  Lact 2.4.  UA large LE and pyuria.  Thus far stools studies:  Cdiff (-) and GI pcr (-).  Ct ap shows longer segment wall thickening involving the proximal sigmoid and descending colon favoring colitis, extensive diverticular disease and diffuse bladder wall thickening; correlate with urinalysis for underlying cystitis.  Pt is on rocephin.     L sided colitis:    - Pt with descending colitis with BRBRP.  Thus far Cdiff neg.  GI pcr neg.  Suspect ischemic >> infectious colitis.  No fever or leukocytosis at present.  No abd pain today.     - Advance diet as tolerated.  GI following, no further intervention at this time.     - Monitor H/H, transfuse PRN    - Continue to monitor h&h and transfuse if hgb drop below 7        - Monitor stool outpt, serial abd exams.      Cystitis/Uti:    - UA large LE with pyuria.  Diffuse bladder wall thickening on CTap.  f/u urine cx - growing >100K proteus miralbilis.  Sensitive to rocephin      - Cont rocephin x 7 day course      Will follow,  d/w daughter at bedside.     Sharita Mishra  [phone]

## 2020-02-23 LAB
ANION GAP SERPL CALC-SCNC: 10 MMOL/L — SIGNIFICANT CHANGE UP (ref 5–17)
BUN SERPL-MCNC: 29 MG/DL — HIGH (ref 7–23)
CALCIUM SERPL-MCNC: 9.3 MG/DL — SIGNIFICANT CHANGE UP (ref 8.4–10.5)
CHLORIDE SERPL-SCNC: 102 MMOL/L — SIGNIFICANT CHANGE UP (ref 96–108)
CO2 SERPL-SCNC: 26 MMOL/L — SIGNIFICANT CHANGE UP (ref 22–31)
CREAT SERPL-MCNC: 1.78 MG/DL — HIGH (ref 0.5–1.3)
CULTURE RESULTS: SIGNIFICANT CHANGE UP
GLUCOSE BLDC GLUCOMTR-MCNC: 132 MG/DL — HIGH (ref 70–99)
GLUCOSE BLDC GLUCOMTR-MCNC: 167 MG/DL — HIGH (ref 70–99)
GLUCOSE BLDC GLUCOMTR-MCNC: 171 MG/DL — HIGH (ref 70–99)
GLUCOSE BLDC GLUCOMTR-MCNC: 213 MG/DL — HIGH (ref 70–99)
GLUCOSE SERPL-MCNC: 148 MG/DL — HIGH (ref 70–99)
HCT VFR BLD CALC: 27.6 % — LOW (ref 34.5–45)
HGB BLD-MCNC: 8.4 G/DL — LOW (ref 11.5–15.5)
MCHC RBC-ENTMCNC: 25.9 PG — LOW (ref 27–34)
MCHC RBC-ENTMCNC: 30.4 GM/DL — LOW (ref 32–36)
MCV RBC AUTO: 85.2 FL — SIGNIFICANT CHANGE UP (ref 80–100)
NRBC # BLD: 0 /100 WBCS — SIGNIFICANT CHANGE UP (ref 0–0)
PLATELET # BLD AUTO: 219 K/UL — SIGNIFICANT CHANGE UP (ref 150–400)
POTASSIUM SERPL-MCNC: 4.3 MMOL/L — SIGNIFICANT CHANGE UP (ref 3.5–5.3)
POTASSIUM SERPL-SCNC: 4.3 MMOL/L — SIGNIFICANT CHANGE UP (ref 3.5–5.3)
RBC # BLD: 3.24 M/UL — LOW (ref 3.8–5.2)
RBC # FLD: 15.6 % — HIGH (ref 10.3–14.5)
SODIUM SERPL-SCNC: 138 MMOL/L — SIGNIFICANT CHANGE UP (ref 135–145)
SPECIMEN SOURCE: SIGNIFICANT CHANGE UP
WBC # BLD: 9.06 K/UL — SIGNIFICANT CHANGE UP (ref 3.8–10.5)
WBC # FLD AUTO: 9.06 K/UL — SIGNIFICANT CHANGE UP (ref 3.8–10.5)

## 2020-02-23 RX ORDER — CEFUROXIME AXETIL 250 MG
250 TABLET ORAL EVERY 12 HOURS
Refills: 0 | Status: DISCONTINUED | OUTPATIENT
Start: 2020-02-23 | End: 2020-02-23

## 2020-02-23 RX ORDER — SODIUM CHLORIDE 9 MG/ML
1000 INJECTION INTRAMUSCULAR; INTRAVENOUS; SUBCUTANEOUS
Refills: 0 | Status: DISCONTINUED | OUTPATIENT
Start: 2020-02-23 | End: 2020-02-23

## 2020-02-23 RX ORDER — CEFUROXIME AXETIL 250 MG
250 TABLET ORAL DAILY
Refills: 0 | Status: COMPLETED | OUTPATIENT
Start: 2020-02-23 | End: 2020-02-26

## 2020-02-23 RX ORDER — PANTOPRAZOLE SODIUM 20 MG/1
40 TABLET, DELAYED RELEASE ORAL
Refills: 0 | Status: DISCONTINUED | OUTPATIENT
Start: 2020-02-23 | End: 2020-02-26

## 2020-02-23 RX ORDER — METOPROLOL TARTRATE 50 MG
50 TABLET ORAL DAILY
Refills: 0 | Status: DISCONTINUED | OUTPATIENT
Start: 2020-02-23 | End: 2020-02-26

## 2020-02-23 RX ADMIN — DONEPEZIL HYDROCHLORIDE 5 MILLIGRAM(S): 10 TABLET, FILM COATED ORAL at 21:45

## 2020-02-23 RX ADMIN — Medication 1: at 12:05

## 2020-02-23 RX ADMIN — PANTOPRAZOLE SODIUM 40 MILLIGRAM(S): 20 TABLET, DELAYED RELEASE ORAL at 13:46

## 2020-02-23 RX ADMIN — QUETIAPINE FUMARATE 100 MILLIGRAM(S): 200 TABLET, FILM COATED ORAL at 21:45

## 2020-02-23 RX ADMIN — Medication 125 MICROGRAM(S): at 05:56

## 2020-02-23 RX ADMIN — Medication 50 MILLIGRAM(S): at 09:44

## 2020-02-23 RX ADMIN — ATORVASTATIN CALCIUM 40 MILLIGRAM(S): 80 TABLET, FILM COATED ORAL at 21:45

## 2020-02-23 RX ADMIN — Medication 1: at 09:17

## 2020-02-23 RX ADMIN — Medication 0.1 MILLIGRAM(S): at 09:43

## 2020-02-23 RX ADMIN — BUDESONIDE AND FORMOTEROL FUMARATE DIHYDRATE 2 PUFF(S): 160; 4.5 AEROSOL RESPIRATORY (INHALATION) at 05:56

## 2020-02-23 RX ADMIN — Medication 250 MILLIGRAM(S): at 17:10

## 2020-02-23 RX ADMIN — AMLODIPINE BESYLATE 10 MILLIGRAM(S): 2.5 TABLET ORAL at 09:44

## 2020-02-23 RX ADMIN — Medication 30 MILLIGRAM(S): at 12:05

## 2020-02-23 RX ADMIN — BUDESONIDE AND FORMOTEROL FUMARATE DIHYDRATE 2 PUFF(S): 160; 4.5 AEROSOL RESPIRATORY (INHALATION) at 18:17

## 2020-02-23 NOTE — PROGRESS NOTE ADULT - ASSESSMENT
88 yo woman presents w large volume maroon bloody BMs w large amount of clots, HH was 8.5/28 on admission, rpt was 6, received total 2 Units PRBC, maintained a stable BP, though was orthostatic,   hematochezia stopped on 2/20 but has had dark stools off and on since,  HH remains stable  good po intake  BP has improved on all her anti HTN meds,   uti w GNR: proteus: day 5/7  of ABx, switch to po Ceftin  DVT ppx w PAS

## 2020-02-23 NOTE — PROGRESS NOTE ADULT - ASSESSMENT
GIB  fu with GI   Monitor hemoglobin, transfuse as needed.    HTN  stable   cont current meds     Diastolic CHF   chronic   stable     CKD  pt follow up with renal   stable

## 2020-02-23 NOTE — PROGRESS NOTE ADULT - ASSESSMENT
Ms. Alejandra is an 88 yo lady with PMH of  breast CA, HTN, COPD, T2DM, diastolic CHF, hypothyroidism, presented with 1 episode of BRBPR; daughter notes she was helping patient clean up around 2h PTA and noted blood with clots on toilet paper and a small amount in the toilet bowl. She had 2 more episodes while she was in ER with blood mixed with stool. She now is having more of clots coming out. Patient is unable to provide history due to her baseline dementia No fever or chills were reported. No abdominal pain. Nephrology consulted for CKD stage 3.     1. CKD stage 3: Her Scr worsening now, likely d/t poor po intake and volume depleted- this am Cr 1.78 raised from 1.35 mg/dl (2/21/20) as she is eating or drinking.   2. no Metabolic acidosis   3. Intravascularly depleted.   4. Acute blood loss anemia. Hemoglobin is 8.4 of yesterday   5. HTN. 146/75  6. Diffuse wall thickening with possible cystitis.   7. GI Bleed resolving.     RECOMMEND:  - Hold diuretics  - give NS 50 cc/h for 20 hours  - no midline  - encourage/ feed  by mouth fluid 1 L/day    - Avoid NSAIDs and nephrotoxins.   - Renal dose all medication for GFR < 45 ml/min.   - Give blood transfusion for Hemoglobin < 7.0.   - BMP, CBC, Magnesium and phosphorus daily.  - Conservative treatment.      d/w Dr. Valencia and staff

## 2020-02-23 NOTE — PROGRESS NOTE ADULT - SUBJECTIVE AND OBJECTIVE BOX
Subjective: Patient seen and examined. No new events except as noted.     SUBJECTIVE/ROS:        MEDICATIONS:  MEDICATIONS  (STANDING):  amLODIPine   Tablet 10 milliGRAM(s) Oral daily  atorvastatin 40 milliGRAM(s) Oral at bedtime  budesonide 160 MICROgram(s)/formoterol 4.5 MICROgram(s) Inhaler 2 Puff(s) Inhalation two times a day  cefuroxime   Tablet 250 milliGRAM(s) Oral every 12 hours  cloNIDine 0.1 milliGRAM(s) Oral daily  dextrose 50% Injectable 12.5 Gram(s) IV Push once  dextrose 50% Injectable 25 Gram(s) IV Push once  dextrose 50% Injectable 25 Gram(s) IV Push once  donepezil 5 milliGRAM(s) Oral at bedtime  insulin lispro (HumaLOG) corrective regimen sliding scale   SubCutaneous three times a day before meals  insulin lispro (HumaLOG) corrective regimen sliding scale   SubCutaneous at bedtime  levothyroxine 125 MICROGram(s) Oral daily  metoprolol succinate ER 50 milliGRAM(s) Oral daily  pantoprazole  Injectable 40 milliGRAM(s) IV Push daily  PARoxetine 30 milliGRAM(s) Oral daily  QUEtiapine 100 milliGRAM(s) Oral at bedtime  sodium chloride 0.9%. 1000 milliLiter(s) (50 mL/Hr) IV Continuous <Continuous>      PHYSICAL EXAM:  T(C): 36.5 (02-23-20 @ 08:21), Max: 36.8 (02-22-20 @ 20:06)  HR: 57 (02-23-20 @ 08:21) (57 - 90)  BP: 146/75 (02-23-20 @ 08:07) (117/76 - 146/75)  RR: 18 (02-23-20 @ 08:07) (18 - 18)  SpO2: 96% (02-23-20 @ 08:07) (96% - 98%)  Wt(kg): --  I&O's Summary    22 Feb 2020 07:01  -  23 Feb 2020 07:00  --------------------------------------------------------  IN: 285 mL / OUT: 0 mL / NET: 285 mL            JVP: Normal  Neck: supple  Lung: clear   CV: S1 S2 , Murmur:  Abd: soft  Ext: No edema  neuro: Awake  Psych: flat affect  Skin: normal``    LABS/DATA:    CARDIAC MARKERS:                                8.4    9.06  )-----------( 219      ( 22 Feb 2020 23:53 )             27.6     02-23    138  |  102  |  29<H>  ----------------------------<  148<H>  4.3   |  26  |  1.78<H>    Ca    9.3      23 Feb 2020 06:56      proBNP:   Lipid Profile:   HgA1c:   TSH:     TELE:  EKG:

## 2020-02-23 NOTE — PROGRESS NOTE ADULT - SUBJECTIVE AND OBJECTIVE BOX
Patient seen and examined in bed.  NAD noted.  Nurse said she is poor appetite by mouth, she pulled out IV line:  CCU staff failed 4 times trial to establish IV line.  Urine incontinence     REVIEW OF SYSTEMS:  As per HPI, otherwise 8 full 10 ROS were unremarkable    MEDICATIONS  (STANDING):  amLODIPine   Tablet 10 milliGRAM(s) Oral daily  atorvastatin 40 milliGRAM(s) Oral at bedtime  budesonide 160 MICROgram(s)/formoterol 4.5 MICROgram(s) Inhaler 2 Puff(s) Inhalation two times a day  cefTRIAXone   IVPB      cefTRIAXone   IVPB 1000 milliGRAM(s) IV Intermittent every 24 hours  cloNIDine 0.1 milliGRAM(s) Oral daily  dextrose 50% Injectable 12.5 Gram(s) IV Push once  dextrose 50% Injectable 25 Gram(s) IV Push once  dextrose 50% Injectable 25 Gram(s) IV Push once  donepezil 5 milliGRAM(s) Oral at bedtime  insulin lispro (HumaLOG) corrective regimen sliding scale   SubCutaneous three times a day before meals  insulin lispro (HumaLOG) corrective regimen sliding scale   SubCutaneous at bedtime  levothyroxine 125 MICROGram(s) Oral daily  metoprolol succinate ER 50 milliGRAM(s) Oral daily  pantoprazole  Injectable 40 milliGRAM(s) IV Push daily  PARoxetine 30 milliGRAM(s) Oral daily  QUEtiapine 100 milliGRAM(s) Oral at bedtime      VITAL:  T(C): , Max: 36.8 (02-22-20 @ 20:06)  T(F): , Max: 98.2 (02-22-20 @ 20:06)  HR: 57 (02-23-20 @ 08:21)  BP: 146/75 (02-23-20 @ 08:07)  BP(mean): --  RR: 18 (02-23-20 @ 08:07)  SpO2: 96% (02-23-20 @ 08:07)  Wt(kg): --    I and O's:    02-22 @ 07:01  -  02-23 @ 07:00  --------------------------------------------------------  IN: 285 mL / OUT: 0 mL / NET: 285 mL          PHYSICAL EXAM:    Constitutional: NAD  Neck: No JVD; supple  Respiratory: CTA-b/l  Cardiac: RRR s1s2  Gastrointestinal: BS+, soft, NT/ND  Urologic: No kim  Extremities: No peripheral edema    LABS:                        8.4    9.06  )-----------( 219      ( 22 Feb 2020 23:53 )             27.6     02-23    138  |  102  |  29<H>  ----------------------------<  148<H>  4.3   |  26  |  1.78<H>    Ca    9.3      23 Feb 2020 06:56

## 2020-02-23 NOTE — PROGRESS NOTE ADULT - SUBJECTIVE AND OBJECTIVE BOX
Patient is a 87y old  Female who presents with a chief complaint of LGI bleed (23 Feb 2020 11:51)      SUBJECTIVE / OVERNIGHT EVENTS: no new events, ptn is confused, pulled her IV out, DC planning    MEDICATIONS  (STANDING):  amLODIPine   Tablet 10 milliGRAM(s) Oral daily  atorvastatin 40 milliGRAM(s) Oral at bedtime  budesonide 160 MICROgram(s)/formoterol 4.5 MICROgram(s) Inhaler 2 Puff(s) Inhalation two times a day  cefuroxime   Tablet 250 milliGRAM(s) Oral daily  cloNIDine 0.1 milliGRAM(s) Oral daily  dextrose 50% Injectable 12.5 Gram(s) IV Push once  dextrose 50% Injectable 25 Gram(s) IV Push once  dextrose 50% Injectable 25 Gram(s) IV Push once  donepezil 5 milliGRAM(s) Oral at bedtime  insulin lispro (HumaLOG) corrective regimen sliding scale   SubCutaneous three times a day before meals  insulin lispro (HumaLOG) corrective regimen sliding scale   SubCutaneous at bedtime  levothyroxine 125 MICROGram(s) Oral daily  metoprolol succinate ER 50 milliGRAM(s) Oral daily  pantoprazole    Tablet 40 milliGRAM(s) Oral before breakfast  PARoxetine 30 milliGRAM(s) Oral daily  QUEtiapine 100 milliGRAM(s) Oral at bedtime    MEDICATIONS  (PRN):  ALBUTerol    90 MICROgram(s) HFA Inhaler 2 Puff(s) Inhalation every 6 hours PRN Bronchospasm  dextrose 40% Gel 15 Gram(s) Oral once PRN Blood Glucose LESS THAN 70 milliGRAM(s)/deciliter  glucagon  Injectable 1 milliGRAM(s) IntraMuscular once PRN Glucose LESS THAN 70 milligrams/deciliter      Vital Signs Last 24 Hrs  T(F): 97.5 (02-23-20 @ 16:18), Max: 97.7 (02-23-20 @ 08:21)  HR: 54 (02-23-20 @ 16:18) (54 - 59)  BP: 130/65 (02-23-20 @ 16:18) (127/76 - 146/75)  RR: 18 (02-23-20 @ 16:18) (18 - 18)  SpO2: 97% (02-23-20 @ 16:18) (96% - 100%)  Telemetry:   CAPILLARY BLOOD GLUCOSE      POCT Blood Glucose.: 132 mg/dL (23 Feb 2020 17:20)  POCT Blood Glucose.: 171 mg/dL (23 Feb 2020 12:00)  POCT Blood Glucose.: 167 mg/dL (23 Feb 2020 08:59)  POCT Blood Glucose.: 150 mg/dL (22 Feb 2020 21:10)    I&O's Summary    22 Feb 2020 07:01  -  23 Feb 2020 07:00  --------------------------------------------------------  IN: 285 mL / OUT: 0 mL / NET: 285 mL        PHYSICAL EXAM:  GENERAL: NAD, well-developed  HEAD:  Atraumatic, Normocephalic  EYES: EOMI, PERRLA, conjunctiva and sclera clear  NECK: Supple, No JVD  CHEST/LUNG: Clear to auscultation bilaterally; No wheeze  HEART: Regular rate and rhythm; No murmurs, rubs, or gallops  ABDOMEN: Soft, Nontender, Nondistended; Bowel sounds present  EXTREMITIES:  2+ Peripheral Pulses, No clubbing, cyanosis, or edema  PSYCH: AAOx3  NEUROLOGY: non-focal  SKIN: No rashes or lesions    LABS:                        8.4    9.06  )-----------( 219      ( 22 Feb 2020 23:53 )             27.6     02-23    138  |  102  |  29<H>  ----------------------------<  148<H>  4.3   |  26  |  1.78<H>    Ca    9.3      23 Feb 2020 06:56                RADIOLOGY & ADDITIONAL TESTS:    Imaging Personally Reviewed:    Consultant(s) Notes Reviewed:      Care Discussed with Consultants/Other Providers:

## 2020-02-23 NOTE — PROVIDER CONTACT NOTE (OTHER) - ACTION/TREATMENT ORDERED:
CBC to be drawn Q12,
XIMENA Irby notified and aware of situation. ok to not have IV access right now. will continue to monitor.
No intervention at this time, pt hypertensive at baseline, continue to monitor.
Provider aware

## 2020-02-23 NOTE — CHART NOTE - NSCHARTNOTEFT_GEN_A_CORE
pt has dementia/confused, pulled out IV multiple times & very hard stick.  Discussed with Dr. Doe, IV ceftriaxone changed to ceftin 250mg po daily (as per pharmacy recommendation d/t low CrCL), no need for IV access since pt might  be able to be discharged tomorrow pending social service referral. pt has dementia/confused, pulled out IV multiple times & very hard stick.  Discussed with Dr. Doe, IV ceftriaxone changed to ceftin 250mg po daily (as per pharmacy recommendation d/t low CrCL), no need for IV access since pt might  be able to be discharged tomorrow pending social service referral.  Also will encourage po intake & oral hydration instead of IV fluids.  Plan d/w RN.

## 2020-02-24 LAB
ANION GAP SERPL CALC-SCNC: 10 MMOL/L — SIGNIFICANT CHANGE UP (ref 5–17)
BUN SERPL-MCNC: 25 MG/DL — HIGH (ref 7–23)
CALCIUM SERPL-MCNC: 9.2 MG/DL — SIGNIFICANT CHANGE UP (ref 8.4–10.5)
CHLORIDE SERPL-SCNC: 104 MMOL/L — SIGNIFICANT CHANGE UP (ref 96–108)
CO2 SERPL-SCNC: 25 MMOL/L — SIGNIFICANT CHANGE UP (ref 22–31)
CREAT SERPL-MCNC: 1.43 MG/DL — HIGH (ref 0.5–1.3)
GLUCOSE BLDC GLUCOMTR-MCNC: 120 MG/DL — HIGH (ref 70–99)
GLUCOSE BLDC GLUCOMTR-MCNC: 155 MG/DL — HIGH (ref 70–99)
GLUCOSE BLDC GLUCOMTR-MCNC: 205 MG/DL — HIGH (ref 70–99)
GLUCOSE BLDC GLUCOMTR-MCNC: 214 MG/DL — HIGH (ref 70–99)
GLUCOSE SERPL-MCNC: 150 MG/DL — HIGH (ref 70–99)
HCT VFR BLD CALC: 26.6 % — LOW (ref 34.5–45)
HGB BLD-MCNC: 8.1 G/DL — LOW (ref 11.5–15.5)
MCHC RBC-ENTMCNC: 25.9 PG — LOW (ref 27–34)
MCHC RBC-ENTMCNC: 30.5 GM/DL — LOW (ref 32–36)
MCV RBC AUTO: 85 FL — SIGNIFICANT CHANGE UP (ref 80–100)
NRBC # BLD: 0 /100 WBCS — SIGNIFICANT CHANGE UP (ref 0–0)
PHOSPHATE SERPL-MCNC: 3.4 MG/DL — SIGNIFICANT CHANGE UP (ref 2.5–4.5)
PLATELET # BLD AUTO: 278 K/UL — SIGNIFICANT CHANGE UP (ref 150–400)
POTASSIUM SERPL-MCNC: 4.1 MMOL/L — SIGNIFICANT CHANGE UP (ref 3.5–5.3)
POTASSIUM SERPL-SCNC: 4.1 MMOL/L — SIGNIFICANT CHANGE UP (ref 3.5–5.3)
RBC # BLD: 3.13 M/UL — LOW (ref 3.8–5.2)
RBC # FLD: 15.4 % — HIGH (ref 10.3–14.5)
SODIUM SERPL-SCNC: 139 MMOL/L — SIGNIFICANT CHANGE UP (ref 135–145)
WBC # BLD: 8.14 K/UL — SIGNIFICANT CHANGE UP (ref 3.8–10.5)
WBC # FLD AUTO: 8.14 K/UL — SIGNIFICANT CHANGE UP (ref 3.8–10.5)

## 2020-02-24 RX ORDER — SENNA PLUS 8.6 MG/1
2 TABLET ORAL AT BEDTIME
Refills: 0 | Status: DISCONTINUED | OUTPATIENT
Start: 2020-02-24 | End: 2020-02-26

## 2020-02-24 RX ADMIN — Medication 50 MILLIGRAM(S): at 05:53

## 2020-02-24 RX ADMIN — Medication 2: at 13:00

## 2020-02-24 RX ADMIN — Medication 30 MILLIGRAM(S): at 11:58

## 2020-02-24 RX ADMIN — BUDESONIDE AND FORMOTEROL FUMARATE DIHYDRATE 2 PUFF(S): 160; 4.5 AEROSOL RESPIRATORY (INHALATION) at 05:53

## 2020-02-24 RX ADMIN — DONEPEZIL HYDROCHLORIDE 5 MILLIGRAM(S): 10 TABLET, FILM COATED ORAL at 21:09

## 2020-02-24 RX ADMIN — AMLODIPINE BESYLATE 10 MILLIGRAM(S): 2.5 TABLET ORAL at 05:53

## 2020-02-24 RX ADMIN — QUETIAPINE FUMARATE 100 MILLIGRAM(S): 200 TABLET, FILM COATED ORAL at 21:09

## 2020-02-24 RX ADMIN — Medication 1: at 08:42

## 2020-02-24 RX ADMIN — SENNA PLUS 2 TABLET(S): 8.6 TABLET ORAL at 21:09

## 2020-02-24 RX ADMIN — ATORVASTATIN CALCIUM 40 MILLIGRAM(S): 80 TABLET, FILM COATED ORAL at 21:09

## 2020-02-24 RX ADMIN — PANTOPRAZOLE SODIUM 40 MILLIGRAM(S): 20 TABLET, DELAYED RELEASE ORAL at 05:54

## 2020-02-24 RX ADMIN — Medication 250 MILLIGRAM(S): at 11:58

## 2020-02-24 RX ADMIN — BUDESONIDE AND FORMOTEROL FUMARATE DIHYDRATE 2 PUFF(S): 160; 4.5 AEROSOL RESPIRATORY (INHALATION) at 17:20

## 2020-02-24 RX ADMIN — Medication 125 MICROGRAM(S): at 05:53

## 2020-02-24 RX ADMIN — Medication 0.1 MILLIGRAM(S): at 05:53

## 2020-02-24 NOTE — PROGRESS NOTE ADULT - SUBJECTIVE AND OBJECTIVE BOX
[facility] [phone]        Patient seen and examined in bed.  She was about the same         MEDICATIONS  (STANDING):  amLODIPine   Tablet 10 milliGRAM(s) Oral daily  atorvastatin 40 milliGRAM(s) Oral at bedtime  budesonide 160 MICROgram(s)/formoterol 4.5 MICROgram(s) Inhaler 2 Puff(s) Inhalation two times a day  cefuroxime   Tablet 250 milliGRAM(s) Oral daily  cloNIDine 0.1 milliGRAM(s) Oral daily  dextrose 50% Injectable 12.5 Gram(s) IV Push once  dextrose 50% Injectable 25 Gram(s) IV Push once  dextrose 50% Injectable 25 Gram(s) IV Push once  donepezil 5 milliGRAM(s) Oral at bedtime  insulin lispro (HumaLOG) corrective regimen sliding scale   SubCutaneous three times a day before meals  insulin lispro (HumaLOG) corrective regimen sliding scale   SubCutaneous at bedtime  levothyroxine 125 MICROGram(s) Oral daily  metoprolol succinate ER 50 milliGRAM(s) Oral daily  pantoprazole    Tablet 40 milliGRAM(s) Oral before breakfast  PARoxetine 30 milliGRAM(s) Oral daily  QUEtiapine 100 milliGRAM(s) Oral at bedtime      VITAL:  T(C): , Max: 37.2 (02-24-20 @ 08:06)  T(F): , Max: 99 (02-24-20 @ 08:06)  HR: 56 (02-24-20 @ 08:06)  BP: 112/63 (02-24-20 @ 08:06)  BP(mean): --  RR: 18 (02-24-20 @ 08:06)  SpO2: 95% (02-24-20 @ 08:06)  Wt(kg): --    I and O's:        PHYSICAL EXAM:    Constitutional: NAD  Neck:  No JVD  Respiratory: poor effort   Cardiovascular: S1 and S2  Gastrointestinal: BS+, soft, NT/ND  Extremities: No peripheral edema  Neurological:   no focal deficits  Psychiatric: Normal mood, normal affect  : No Stephen  Skin: No rashes  Access: Not applicable    LABS:                        8.1    8.14  )-----------( 278      ( 24 Feb 2020 06:13 )             26.6     02-24    139  |  104  |  25<H>  ----------------------------<  150<H>  4.1   |  25  |  1.43<H>    Ca    9.2      24 Feb 2020 06:13  Phos  3.4     02-24            Urine Studies:          RADIOLOGY & ADDITIONAL STUDIES:

## 2020-02-24 NOTE — PROGRESS NOTE ADULT - ASSESSMENT
GIB  fu with GI   Monitor hemoglobin, transfuse as needed.    HTN  stable   cont current meds     Diastolic CHF   chronic   stable     CKD  crt improving    DC planning as per primary team

## 2020-02-24 NOTE — PROGRESS NOTE ADULT - ASSESSMENT
Ms. Alejandra is an 88 yo lady with PMH of  breast CA, HTN, COPD, T2DM, diastolic CHF, hypothyroidism, presented with BRBPR;    CKD stage 3.     1. CKD stage 3: H   2. Acute blood loss anemia. Hemoglobin is 8.1   3. HTN   4. Diffuse wall thickening with possible cystitis.   5. GI Bleed resolving.     RECOMMEND:      - GI follow up but likely conservative management   - encourage/ feed  by mouth fluid 1 L/day    - Renal dose all medication for GFR < 45 ml/min.   - Give blood transfusion for Hemoglobin < 7.0.   - BMP, CBC, Magnesium and phosphorus daily.

## 2020-02-24 NOTE — PROGRESS NOTE ADULT - ASSESSMENT
88 yo woman presents w large volume maroon bloody BMs w large amount of clots, HH was 8.5/28 on admission, rpt was 6, received total 2 Units PRBC, maintained a stable BP, though was orthostatic,   hematochezia stopped on 2/20 but has had dark stools off and on since,  HH remains stable  good po intake  BP has improved on all her anti HTN meds,   uti w GNR: proteus: day 6/7  of ABx, on  po Ceftin  DVT ppx w PAS

## 2020-02-24 NOTE — PROGRESS NOTE ADULT - SUBJECTIVE AND OBJECTIVE BOX
Patient is a 87y old  Female who presents with a chief complaint of LGI bleed (24 Feb 2020 14:36)      SUBJECTIVE / OVERNIGHT EVENTS: no new developments    MEDICATIONS  (STANDING):  amLODIPine   Tablet 10 milliGRAM(s) Oral daily  atorvastatin 40 milliGRAM(s) Oral at bedtime  budesonide 160 MICROgram(s)/formoterol 4.5 MICROgram(s) Inhaler 2 Puff(s) Inhalation two times a day  cefuroxime   Tablet 250 milliGRAM(s) Oral daily  cloNIDine 0.1 milliGRAM(s) Oral daily  dextrose 50% Injectable 12.5 Gram(s) IV Push once  dextrose 50% Injectable 25 Gram(s) IV Push once  dextrose 50% Injectable 25 Gram(s) IV Push once  donepezil 5 milliGRAM(s) Oral at bedtime  insulin lispro (HumaLOG) corrective regimen sliding scale   SubCutaneous three times a day before meals  insulin lispro (HumaLOG) corrective regimen sliding scale   SubCutaneous at bedtime  levothyroxine 125 MICROGram(s) Oral daily  metoprolol succinate ER 50 milliGRAM(s) Oral daily  pantoprazole    Tablet 40 milliGRAM(s) Oral before breakfast  PARoxetine 30 milliGRAM(s) Oral daily  QUEtiapine 100 milliGRAM(s) Oral at bedtime    MEDICATIONS  (PRN):  ALBUTerol    90 MICROgram(s) HFA Inhaler 2 Puff(s) Inhalation every 6 hours PRN Bronchospasm  dextrose 40% Gel 15 Gram(s) Oral once PRN Blood Glucose LESS THAN 70 milliGRAM(s)/deciliter  glucagon  Injectable 1 milliGRAM(s) IntraMuscular once PRN Glucose LESS THAN 70 milligrams/deciliter      Vital Signs Last 24 Hrs  T(F): 98.2 (02-24-20 @ 15:47), Max: 99 (02-24-20 @ 08:06)  HR: 78 (02-24-20 @ 15:47) (56 - 78)  BP: 104/65 (02-24-20 @ 15:47) (104/65 - 166/66)  RR: 18 (02-24-20 @ 15:47) (18 - 18)  SpO2: 99% (02-24-20 @ 15:47) (95% - 100%)  Telemetry:   CAPILLARY BLOOD GLUCOSE      POCT Blood Glucose.: 120 mg/dL (24 Feb 2020 17:23)  POCT Blood Glucose.: 205 mg/dL (24 Feb 2020 12:36)  POCT Blood Glucose.: 155 mg/dL (24 Feb 2020 08:35)  POCT Blood Glucose.: 213 mg/dL (23 Feb 2020 21:38)    I&O's Summary    24 Feb 2020 07:01  -  24 Feb 2020 17:40  --------------------------------------------------------  IN: 240 mL / OUT: 0 mL / NET: 240 mL        PHYSICAL EXAM:  GENERAL: NAD, well-developed  HEAD:  Atraumatic, Normocephalic  EYES: EOMI, PERRLA, conjunctiva and sclera clear  NECK: Supple, No JVD  CHEST/LUNG: Clear to auscultation bilaterally; No wheeze  HEART: Regular rate and rhythm; No murmurs, rubs, or gallops  ABDOMEN: Soft, Nontender, Nondistended; Bowel sounds present  EXTREMITIES:  2+ Peripheral Pulses, No clubbing, cyanosis, or edema  PSYCH: AAOx3  NEUROLOGY: non-focal  SKIN: No rashes or lesions    LABS:                        8.1    8.14  )-----------( 278      ( 24 Feb 2020 06:13 )             26.6     02-24    139  |  104  |  25<H>  ----------------------------<  150<H>  4.1   |  25  |  1.43<H>    Ca    9.2      24 Feb 2020 06:13  Phos  3.4     02-24                RADIOLOGY & ADDITIONAL TESTS:    Imaging Personally Reviewed:    Consultant(s) Notes Reviewed:      Care Discussed with Consultants/Other Providers:

## 2020-02-24 NOTE — PROGRESS NOTE ADULT - SUBJECTIVE AND OBJECTIVE BOX
Subjective: Patient seen and examined. No new events except as noted.     SUBJECTIVE/ROS:  confused       MEDICATIONS:  MEDICATIONS  (STANDING):  amLODIPine   Tablet 10 milliGRAM(s) Oral daily  atorvastatin 40 milliGRAM(s) Oral at bedtime  budesonide 160 MICROgram(s)/formoterol 4.5 MICROgram(s) Inhaler 2 Puff(s) Inhalation two times a day  cefuroxime   Tablet 250 milliGRAM(s) Oral daily  cloNIDine 0.1 milliGRAM(s) Oral daily  dextrose 50% Injectable 12.5 Gram(s) IV Push once  dextrose 50% Injectable 25 Gram(s) IV Push once  dextrose 50% Injectable 25 Gram(s) IV Push once  donepezil 5 milliGRAM(s) Oral at bedtime  insulin lispro (HumaLOG) corrective regimen sliding scale   SubCutaneous three times a day before meals  insulin lispro (HumaLOG) corrective regimen sliding scale   SubCutaneous at bedtime  levothyroxine 125 MICROGram(s) Oral daily  metoprolol succinate ER 50 milliGRAM(s) Oral daily  pantoprazole    Tablet 40 milliGRAM(s) Oral before breakfast  PARoxetine 30 milliGRAM(s) Oral daily  QUEtiapine 100 milliGRAM(s) Oral at bedtime      PHYSICAL EXAM:  T(C): 36.4 (02-24-20 @ 05:35), Max: 36.5 (02-23-20 @ 08:21)  HR: 66 (02-24-20 @ 05:35) (54 - 66)  BP: 135/75 (02-24-20 @ 05:35) (127/76 - 166/66)  RR: 18 (02-24-20 @ 05:35) (18 - 18)  SpO2: 97% (02-24-20 @ 05:35) (96% - 100%)  Wt(kg): --  I&O's Summary          JVP: Normal  Neck: supple  Lung: clear   CV: S1 S2 , Murmur:  Abd: soft  Ext: No edema  neuro: Awake / alert  Psych: flat affect  Skin: normal``    LABS/DATA:    CARDIAC MARKERS:                                8.1    8.14  )-----------( 278      ( 24 Feb 2020 06:13 )             26.6     02-24    139  |  104  |  25<H>  ----------------------------<  150<H>  4.1   |  25  |  1.43<H>    Ca    9.2      24 Feb 2020 06:13  Phos  3.4     02-24      proBNP:   Lipid Profile:   HgA1c:   TSH:     TELE:  EKG:

## 2020-02-24 NOTE — PROGRESS NOTE ADULT - SUBJECTIVE AND OBJECTIVE BOX
Infectious Diseases progress note:    Subjective: No acute o/n events.  Afebrile.  No dysuria, no diarrhea today.  Events noted.  Pt switched to po ceftin.   Daughter at bedside.     ROS:  CONSTITUTIONAL:  No fever, chills, rigors  CARDIOVASCULAR:  No chest pain or palpitations  RESPIRATORY:   No SOB, cough, dyspnea on exertion.  No wheezing  GASTROINTESTINAL:  No abd pain, N/V, diarrhea/constipation  EXTREMITIES:  No swelling or joint pain  GENITOURINARY:  No burning on urination, increased frequency or urgency.  No flank pain  NEUROLOGIC:  No HA, visual disturbances  SKIN: No rashes    Allergies    Vasotec (Unknown)    Intolerances        ANTIBIOTICS/RELEVANT:  antimicrobials  cefuroxime   Tablet 250 milliGRAM(s) Oral daily    immunologic:    OTHER:  ALBUTerol    90 MICROgram(s) HFA Inhaler 2 Puff(s) Inhalation every 6 hours PRN  amLODIPine   Tablet 10 milliGRAM(s) Oral daily  atorvastatin 40 milliGRAM(s) Oral at bedtime  budesonide 160 MICROgram(s)/formoterol 4.5 MICROgram(s) Inhaler 2 Puff(s) Inhalation two times a day  cloNIDine 0.1 milliGRAM(s) Oral daily  dextrose 40% Gel 15 Gram(s) Oral once PRN  dextrose 50% Injectable 12.5 Gram(s) IV Push once  dextrose 50% Injectable 25 Gram(s) IV Push once  dextrose 50% Injectable 25 Gram(s) IV Push once  donepezil 5 milliGRAM(s) Oral at bedtime  glucagon  Injectable 1 milliGRAM(s) IntraMuscular once PRN  insulin lispro (HumaLOG) corrective regimen sliding scale   SubCutaneous three times a day before meals  insulin lispro (HumaLOG) corrective regimen sliding scale   SubCutaneous at bedtime  levothyroxine 125 MICROGram(s) Oral daily  metoprolol succinate ER 50 milliGRAM(s) Oral daily  pantoprazole    Tablet 40 milliGRAM(s) Oral before breakfast  PARoxetine 30 milliGRAM(s) Oral daily  QUEtiapine 100 milliGRAM(s) Oral at bedtime      Objective:  Vital Signs Last 24 Hrs  T(C): 36.5 (24 Feb 2020 12:10), Max: 37.2 (24 Feb 2020 08:06)  T(F): 97.7 (24 Feb 2020 12:10), Max: 99 (24 Feb 2020 08:06)  HR: 58 (24 Feb 2020 12:10) (54 - 66)  BP: 114/67 (24 Feb 2020 12:10) (112/63 - 166/66)  BP(mean): --  RR: 18 (24 Feb 2020 12:10) (18 - 18)  SpO2: 100% (24 Feb 2020 12:10) (95% - 100%)    PHYSICAL EXAM:  Constitutional:NAD  Eyes:CAROLINE, EOMI  Ear/Nose/Throat: no thrush, mucositis.  Moist mucous membranes	  Neck:no JVD, no lymphadenopathy, supple  Respiratory: CTA bren  Cardiovascular: S1S2 RRR, no murmurs  Gastrointestinal:soft, nontender,  nondistended (+) BS  Extremities:no e/e/c  Skin:  no rashes, open wounds or ulcerations        LABS:                        8.1    8.14  )-----------( 278      ( 24 Feb 2020 06:13 )             26.6     02-24    139  |  104  |  25<H>  ----------------------------<  150<H>  4.1   |  25  |  1.43<H>    Ca    9.2      24 Feb 2020 06:13  Phos  3.4     02-24                              MICROBIOLOGY:          RADIOLOGY & ADDITIONAL STUDIES:

## 2020-02-24 NOTE — PROGRESS NOTE ADULT - ASSESSMENT
86 yo hx of breast CA, dementia,  HTN, COPD, T2DM, diastolic CHF, hypothyroidism, developed BRBPR this am, daughter notes she was helping patient clean up around 6 am and noted blood with clots on toilet paper and a small amount in the toilet bowl. Daughter denies pt reporting CP, SOB, or dizziness. Ptn has baseline dementia, appears confortable and denies any discomfort. pale appearing, Ptn had LGI bleed 5 years ago but daughter unclear about the results. (17 Feb 2020 15:54)    Pt afebrile, normal WBC.  Lact 2.4.  UA large LE and pyuria.  Thus far stools studies:  Cdiff (-) and GI pcr (-).  Ct ap shows longer segment wall thickening involving the proximal sigmoid and descending colon favoring colitis, extensive diverticular disease and diffuse bladder wall thickening; correlate with urinalysis for underlying cystitis.  Pt is on rocephin.     L sided colitis:    - Pt with descending colitis with BRBRP.  Thus far Cdiff neg.  GI pcr neg.  Suspect ischemic >> infectious colitis.  No fever or leukocytosis at present.  No abd pain today.     - Advance diet as tolerated.  GI following, no further intervention at this time.     - Monitor H/H, transfuse PRN    - Continue to monitor h&h and transfuse if hgb drop below 7        - Monitor stool outpt, serial abd exams.      Cystitis/Uti:    - UA large LE with pyuria.  Diffuse bladder wall thickening on CTap.  f/u urine cx - growing >100K proteus miralbilis.  Sensitive to rocephin      - Cont rocephin x 7 day course, now switched to po ceftin.      D/c planning.  No further ID w/u at this time.         Sharita Mishra  [phone]

## 2020-02-25 ENCOUNTER — TRANSCRIPTION ENCOUNTER (OUTPATIENT)
Age: 85
End: 2020-02-25

## 2020-02-25 LAB
ANION GAP SERPL CALC-SCNC: 9 MMOL/L — SIGNIFICANT CHANGE UP (ref 5–17)
BUN SERPL-MCNC: 37 MG/DL — HIGH (ref 7–23)
CALCIUM SERPL-MCNC: 9.2 MG/DL — SIGNIFICANT CHANGE UP (ref 8.4–10.5)
CHLORIDE SERPL-SCNC: 102 MMOL/L — SIGNIFICANT CHANGE UP (ref 96–108)
CO2 SERPL-SCNC: 26 MMOL/L — SIGNIFICANT CHANGE UP (ref 22–31)
CREAT SERPL-MCNC: 1.62 MG/DL — HIGH (ref 0.5–1.3)
GLUCOSE BLDC GLUCOMTR-MCNC: 131 MG/DL — HIGH (ref 70–99)
GLUCOSE BLDC GLUCOMTR-MCNC: 137 MG/DL — HIGH (ref 70–99)
GLUCOSE BLDC GLUCOMTR-MCNC: 154 MG/DL — HIGH (ref 70–99)
GLUCOSE BLDC GLUCOMTR-MCNC: 257 MG/DL — HIGH (ref 70–99)
GLUCOSE SERPL-MCNC: 150 MG/DL — HIGH (ref 70–99)
HCT VFR BLD CALC: 28.4 % — LOW (ref 34.5–45)
HGB BLD-MCNC: 8.5 G/DL — LOW (ref 11.5–15.5)
MCHC RBC-ENTMCNC: 25.5 PG — LOW (ref 27–34)
MCHC RBC-ENTMCNC: 29.9 GM/DL — LOW (ref 32–36)
MCV RBC AUTO: 85.3 FL — SIGNIFICANT CHANGE UP (ref 80–100)
NRBC # BLD: 0 /100 WBCS — SIGNIFICANT CHANGE UP (ref 0–0)
PLATELET # BLD AUTO: 285 K/UL — SIGNIFICANT CHANGE UP (ref 150–400)
POTASSIUM SERPL-MCNC: 4.4 MMOL/L — SIGNIFICANT CHANGE UP (ref 3.5–5.3)
POTASSIUM SERPL-SCNC: 4.4 MMOL/L — SIGNIFICANT CHANGE UP (ref 3.5–5.3)
RBC # BLD: 3.33 M/UL — LOW (ref 3.8–5.2)
RBC # FLD: 15.3 % — HIGH (ref 10.3–14.5)
SODIUM SERPL-SCNC: 137 MMOL/L — SIGNIFICANT CHANGE UP (ref 135–145)
WBC # BLD: 9.06 K/UL — SIGNIFICANT CHANGE UP (ref 3.8–10.5)
WBC # FLD AUTO: 9.06 K/UL — SIGNIFICANT CHANGE UP (ref 3.8–10.5)

## 2020-02-25 RX ORDER — AMLODIPINE BESYLATE 2.5 MG/1
1 TABLET ORAL
Qty: 0 | Refills: 0 | DISCHARGE

## 2020-02-25 RX ORDER — CEFUROXIME AXETIL 250 MG
1 TABLET ORAL
Qty: 1 | Refills: 0
Start: 2020-02-25 | End: 2020-02-25

## 2020-02-25 RX ORDER — AMLODIPINE BESYLATE 2.5 MG/1
1 TABLET ORAL
Qty: 0 | Refills: 0 | DISCHARGE
Start: 2020-02-25

## 2020-02-25 RX ORDER — SENNA PLUS 8.6 MG/1
2 TABLET ORAL
Qty: 0 | Refills: 0 | DISCHARGE
Start: 2020-02-25

## 2020-02-25 RX ADMIN — BUDESONIDE AND FORMOTEROL FUMARATE DIHYDRATE 2 PUFF(S): 160; 4.5 AEROSOL RESPIRATORY (INHALATION) at 05:29

## 2020-02-25 RX ADMIN — PANTOPRAZOLE SODIUM 40 MILLIGRAM(S): 20 TABLET, DELAYED RELEASE ORAL at 05:29

## 2020-02-25 RX ADMIN — Medication 1: at 09:00

## 2020-02-25 RX ADMIN — Medication 0.1 MILLIGRAM(S): at 05:28

## 2020-02-25 RX ADMIN — Medication 125 MICROGRAM(S): at 05:28

## 2020-02-25 RX ADMIN — ATORVASTATIN CALCIUM 40 MILLIGRAM(S): 80 TABLET, FILM COATED ORAL at 21:34

## 2020-02-25 RX ADMIN — Medication 30 MILLIGRAM(S): at 11:35

## 2020-02-25 RX ADMIN — QUETIAPINE FUMARATE 100 MILLIGRAM(S): 200 TABLET, FILM COATED ORAL at 21:35

## 2020-02-25 RX ADMIN — DONEPEZIL HYDROCHLORIDE 5 MILLIGRAM(S): 10 TABLET, FILM COATED ORAL at 21:35

## 2020-02-25 RX ADMIN — Medication 1: at 21:48

## 2020-02-25 RX ADMIN — Medication 250 MILLIGRAM(S): at 11:35

## 2020-02-25 RX ADMIN — Medication 50 MILLIGRAM(S): at 05:28

## 2020-02-25 RX ADMIN — SENNA PLUS 2 TABLET(S): 8.6 TABLET ORAL at 21:34

## 2020-02-25 RX ADMIN — AMLODIPINE BESYLATE 10 MILLIGRAM(S): 2.5 TABLET ORAL at 05:28

## 2020-02-25 RX ADMIN — BUDESONIDE AND FORMOTEROL FUMARATE DIHYDRATE 2 PUFF(S): 160; 4.5 AEROSOL RESPIRATORY (INHALATION) at 17:33

## 2020-02-25 NOTE — PROGRESS NOTE ADULT - ASSESSMENT
Ms. Alejandra is an 86 yo lady with PMH of  breast CA, HTN, COPD, T2DM, diastolic CHF, hypothyroidism, presented with BRBPR;    CKD stage 3.     1. CKD stage 3:   2. Acute blood loss anemia. Hemoglobin is 8.5  3. HTN   4. Diffuse wall thickening with possible cystitis.   5. GI Bleed resolving.     RECOMMEND:      - GI follow up but likely conservative management   - encourage/ feed  by mouth fluid 1 L/day.  Bowel movement yesterday that was not black   - Renal dose all medication for GFR < 45 ml/min.   - Give blood transfusion for Hemoglobin < 7.0.   - BMP, CBC, Magnesium and phosphorus daily.    ? DC planning

## 2020-02-25 NOTE — PROGRESS NOTE ADULT - SUBJECTIVE AND OBJECTIVE BOX
[facility] [phone]        Patient seen and examined in bed.  SHe was in good spirits         MEDICATIONS  (STANDING):  amLODIPine   Tablet 10 milliGRAM(s) Oral daily  atorvastatin 40 milliGRAM(s) Oral at bedtime  budesonide 160 MICROgram(s)/formoterol 4.5 MICROgram(s) Inhaler 2 Puff(s) Inhalation two times a day  cefuroxime   Tablet 250 milliGRAM(s) Oral daily  cloNIDine 0.1 milliGRAM(s) Oral daily  dextrose 50% Injectable 12.5 Gram(s) IV Push once  dextrose 50% Injectable 25 Gram(s) IV Push once  dextrose 50% Injectable 25 Gram(s) IV Push once  donepezil 5 milliGRAM(s) Oral at bedtime  insulin lispro (HumaLOG) corrective regimen sliding scale   SubCutaneous three times a day before meals  insulin lispro (HumaLOG) corrective regimen sliding scale   SubCutaneous at bedtime  levothyroxine 125 MICROGram(s) Oral daily  metoprolol succinate ER 50 milliGRAM(s) Oral daily  pantoprazole    Tablet 40 milliGRAM(s) Oral before breakfast  PARoxetine 30 milliGRAM(s) Oral daily  QUEtiapine 100 milliGRAM(s) Oral at bedtime  senna 2 Tablet(s) Oral at bedtime      VITAL:  T(C): , Max: 37 (02-25-20 @ 01:03)  T(F): , Max: 98.6 (02-25-20 @ 01:03)  HR: 74 (02-25-20 @ 08:30)  BP: 138/80 (02-25-20 @ 08:30)  BP(mean): --  RR: 18 (02-25-20 @ 08:30)  SpO2: 94% (02-25-20 @ 08:30)  Wt(kg): --    I and O's:    02-24 @ 07:01  -  02-25 @ 07:00  --------------------------------------------------------  IN: 240 mL / OUT: 0 mL / NET: 240 mL          PHYSICAL EXAM:    Constitutional: NAD  Neck:  No JVD  Respiratory: CTAB/L  Cardiovascular: S1 and S2  Gastrointestinal: BS+, soft, NT/ND  Extremities: No peripheral edema  Neurological: A/O x 3, no focal deficits  Psychiatric: Normal mood, normal affect  : No Stephen  Skin: No rashes  Access: Not applicable    LABS:                        8.5    9.06  )-----------( 285      ( 25 Feb 2020 06:29 )             28.4     02-25    137  |  102  |  37<H>  ----------------------------<  150<H>  4.4   |  26  |  1.62<H>    Ca    9.2      25 Feb 2020 06:26  Phos  3.4     02-24            Urine Studies:          RADIOLOGY & ADDITIONAL STUDIES:

## 2020-02-25 NOTE — DISCHARGE NOTE PROVIDER - NSDCMRMEDTOKEN_GEN_ALL_CORE_FT
albuterol-ipratropium 2.5 mg-0.5 mg/3 mL inhalation solution: 3 milliliter(s) inhaled every 6 hours, As Needed  amLODIPine 10 mg oral tablet: 1 tab(s) orally once a day  bedside commode :   cefuroxime 250 mg oral tablet: 1 tab(s) orally once a day  cloNIDine 0.1 mg oral tablet: 1 tab(s) orally once a day  donepezil 5 mg oral tablet: 1 tab(s) orally once a day (at bedtime)  ferrous sulfate 325 mg (65 mg elemental iron) oral tablet: 1 tab(s) orally once a day  fluticasone-salmeterol 250 mcg-50 mcg inhalation powder: 1 puff(s) inhaled 2 times a day, As Needed  furosemide 40 mg oral tablet: 1 tab(s) orally every other day  levothyroxine 125 mcg (0.125 mg) oral tablet: 1 tab(s) orally once a day  metFORMIN 500 mg oral tablet: 2 tabs orally in the morning  1 tab orally in the evening  metoprolol succinate 50 mg oral tablet, extended release: 1 tab(s) orally once a day  omeprazole 20 mg oral delayed release capsule: 2 cap(s) orally once a day  PARoxetine 40 mg oral tablet: 1 tab(s) orally once a day  QUEtiapine 100 mg oral tablet: 1 tab(s) orally once a day (at bedtime)  Rolling Walker:   senna oral tablet: 2 tab(s) orally once a day (at bedtime)  shower Chair :   simvastatin 80 mg oral tablet: 1 tab(s) orally once a day (at bedtime)  Ventolin HFA 90 mcg/inh inhalation aerosol: 2 puff(s) inhaled every 6 hours, As Needed albuterol-ipratropium 2.5 mg-0.5 mg/3 mL inhalation solution: 3 milliliter(s) inhaled every 6 hours, As Needed  amLODIPine 10 mg oral tablet: 1 tab(s) orally once a day  bedside commode :   cloNIDine 0.1 mg oral tablet: 1 tab(s) orally once a day  donepezil 5 mg oral tablet: 1 tab(s) orally once a day (at bedtime)  ferrous sulfate 325 mg (65 mg elemental iron) oral tablet: 1 tab(s) orally once a day  fluticasone-salmeterol 250 mcg-50 mcg inhalation powder: 1 puff(s) inhaled 2 times a day, As Needed  furosemide 40 mg oral tablet: 1 tab(s) orally every other day  levothyroxine 125 mcg (0.125 mg) oral tablet: 1 tab(s) orally once a day  metFORMIN 500 mg oral tablet: 2 tabs orally in the morning  1 tab orally in the evening  metoprolol succinate 50 mg oral tablet, extended release: 1 tab(s) orally once a day  omeprazole 20 mg oral delayed release capsule: 2 cap(s) orally once a day  PARoxetine 40 mg oral tablet: 1 tab(s) orally once a day  QUEtiapine 100 mg oral tablet: 1 tab(s) orally once a day (at bedtime)  Rolling Walker:   senna oral tablet: 2 tab(s) orally once a day (at bedtime)  shower Chair :   simvastatin 80 mg oral tablet: 1 tab(s) orally once a day (at bedtime)  Ventolin HFA 90 mcg/inh inhalation aerosol: 2 puff(s) inhaled every 6 hours, As Needed

## 2020-02-25 NOTE — DISCHARGE NOTE NURSING/CASE MANAGEMENT/SOCIAL WORK - PATIENT PORTAL LINK FT
You can access the FollowMyHealth Patient Portal offered by [facility] by registering at the following website: [email or web address]. By joining [facility]’s FollowMyHealth portal, you will also be able to view your health information using other applications (apps) compatible with our system.

## 2020-02-25 NOTE — DISCHARGE NOTE PROVIDER - NSDCCAREPROVSEEN_GEN_ALL_CORE_FT
[facility] Gastroenterology, Team  Carina Doe  [facility] Medicine, Advance PracticeTeam  [facility] Surgery, [facility] Gastroenterology, Team  Carina Doe  [facility], Perri Redmond

## 2020-02-25 NOTE — DISCHARGE NOTE PROVIDER - PROVIDER TOKENS
PROVIDER:[TOKEN:[[id number]],FOLLOWUP:[2 weeks]] PROVIDER:[TOKEN:[[id number]],FOLLOWUP:[2 weeks]],PROVIDER:[TOKEN:[[id number]]],PROVIDER:[TOKEN:[[id number]]]

## 2020-02-25 NOTE — PROGRESS NOTE ADULT - SUBJECTIVE AND OBJECTIVE BOX
Subjective: Patient seen and examined. No new events except as noted.     SUBJECTIVE/ROS:        MEDICATIONS:  MEDICATIONS  (STANDING):  amLODIPine   Tablet 10 milliGRAM(s) Oral daily  atorvastatin 40 milliGRAM(s) Oral at bedtime  budesonide 160 MICROgram(s)/formoterol 4.5 MICROgram(s) Inhaler 2 Puff(s) Inhalation two times a day  cefuroxime   Tablet 250 milliGRAM(s) Oral daily  cloNIDine 0.1 milliGRAM(s) Oral daily  dextrose 50% Injectable 12.5 Gram(s) IV Push once  dextrose 50% Injectable 25 Gram(s) IV Push once  dextrose 50% Injectable 25 Gram(s) IV Push once  donepezil 5 milliGRAM(s) Oral at bedtime  insulin lispro (HumaLOG) corrective regimen sliding scale   SubCutaneous three times a day before meals  insulin lispro (HumaLOG) corrective regimen sliding scale   SubCutaneous at bedtime  levothyroxine 125 MICROGram(s) Oral daily  metoprolol succinate ER 50 milliGRAM(s) Oral daily  pantoprazole    Tablet 40 milliGRAM(s) Oral before breakfast  PARoxetine 30 milliGRAM(s) Oral daily  QUEtiapine 100 milliGRAM(s) Oral at bedtime  senna 2 Tablet(s) Oral at bedtime      PHYSICAL EXAM:  T(C): 36.4 (02-25-20 @ 08:30), Max: 37 (02-25-20 @ 01:03)  HR: 74 (02-25-20 @ 08:30) (55 - 80)  BP: 138/80 (02-25-20 @ 08:30) (104/65 - 160/76)  RR: 18 (02-25-20 @ 08:30) (18 - 18)  SpO2: 94% (02-25-20 @ 08:30) (94% - 100%)  Wt(kg): --  I&O's Summary    24 Feb 2020 07:01  -  25 Feb 2020 07:00  --------------------------------------------------------  IN: 240 mL / OUT: 0 mL / NET: 240 mL            JVP: Normal  Neck: supple  Lung: clear   CV: S1 S2 , Murmur:  Abd: soft  Ext: No edema  neuro: Awake / alert  Psych: flat affect  Skin: normal``    LABS/DATA:    CARDIAC MARKERS:                                8.5    9.06  )-----------( 285      ( 25 Feb 2020 06:29 )             28.4     02-25    137  |  102  |  37<H>  ----------------------------<  150<H>  4.4   |  26  |  1.62<H>    Ca    9.2      25 Feb 2020 06:26  Phos  3.4     02-24      proBNP:   Lipid Profile:   HgA1c:   TSH:     TELE:  EKG:

## 2020-02-25 NOTE — DISCHARGE NOTE PROVIDER - CARE PROVIDERS DIRECT ADDRESSES
,DirectAddress_Unknown ,DirectAddress_Unknown,DirectAddress_Unknown,[email or web address]

## 2020-02-25 NOTE — DISCHARGE NOTE PROVIDER - HOSPITAL COURSE
86 yo hx of breast CA, dementia,  HTN,  CKD stage 3 , COPD, T2DM, diastolic CHF, hypothyroidism, developed BRBPR this am, daughter notes she was helping patient clean up around 6 am and noted blood with clots on toilet paper and a small amount in the toilet bowl. Daughter denies pt reporting CP, SOB, or dizziness. Patient has baseline dementia, appears confortable and denies any discomfort. Ptn had LGI bleed 5 years ago but daughter unclear about the results. 86 yo hx of breast CA, dementia,  HTN,  CKD stage 3 , COPD, T2DM, diastolic CHF, hypothyroidism, developed BRBPR this am, daughter notes she was helping patient clean up around 6 am and noted blood with clots on toilet paper and a small amount in the toilet bowl. Daughter denies pt reporting CP, SOB, or dizziness. Patient has baseline dementia, appears confortable and denies any discomfort. Ptn had LGI bleed 5 years ago but daughter unclear about the results.           Hematochezia with long segment wall thickening involving the proximal sigmoid and descending colon favoring colitis and extensive diverticular disease: Hgb stable. Pt is afebrile, no overt bleeding, hgb stable (suspect 9.6 hgb was a lab error, likely 8 is normal range given trend and clinical picture)         Problem/plan # 1 diverticular bleed, ischemic or infectious colitis     Completed course of Roceptin x 3 days then ceftin now completed      Family refuses to allow for GI intervention ie EGD,      CBC stable      Follow up with GI outpatient         Problem/plan #2  Diastolic CHF    Continue Lasix every other day as needed    Follow up with your cardiologist outpatient         Problem/plan # 3 CKD stage III    Avoid NSAIDs and nephrotoxins.     Renal dose all medication    Follow up with nephrology outpatient         Problem/plan #4  Dementia     continue Aricept     re-orient to surrounds as needed     Fall precaution         Patient stable for discharge

## 2020-02-25 NOTE — PROGRESS NOTE ADULT - ASSESSMENT
88 yo woman presents w large volume maroon bloody BMs w large amount of clots, HH was 8.5/28 on admission, rpt was 6, received total 2 Units PRBC, maintained a stable BP, though was orthostatic,   hematochezia stopped on 2/20 but has had dark stools off and on since,  HH remains stable  good po intake  BP has improved on all her anti HTN meds,   uti w GNR: proteus: day 7/7  of ABx, on  po Ceftin  DVT ppx w PAS

## 2020-02-25 NOTE — PROGRESS NOTE ADULT - ASSESSMENT
GIB  fu with GI   Monitor hemoglobin, transfuse as needed.    HTN  stable   cont current meds     Diastolic CHF   chronic   stable   diuretics as needed     CKD  stable

## 2020-02-25 NOTE — PROGRESS NOTE ADULT - ASSESSMENT
86 yo hx of breast CA, dementia,  HTN, COPD, T2DM, diastolic CHF, hypothyroidism, developed BRBPR this am, daughter notes she was helping patient clean up around 6 am and noted blood with clots on toilet paper and a small amount in the toilet bowl. Daughter denies pt reporting CP, SOB, or dizziness. Ptn has baseline dementia, appears confortable and denies any discomfort. pale appearing, Ptn had LGI bleed 5 years ago but daughter unclear about the results. (17 Feb 2020 15:54)    Pt afebrile, normal WBC.  Lact 2.4.  UA large LE and pyuria.  Thus far stools studies:  Cdiff (-) and GI pcr (-).  Ct ap shows longer segment wall thickening involving the proximal sigmoid and descending colon favoring colitis, extensive diverticular disease and diffuse bladder wall thickening; correlate with urinalysis for underlying cystitis.  Pt is on rocephin.     L sided colitis:    - Pt with descending colitis with BRBRP.  Thus far Cdiff neg.  GI pcr neg.  Suspect ischemic >> infectious colitis.  No fever or leukocytosis at present.  No abd pain today.     - Advance diet as tolerated.  GI following, no further intervention at this time.     - Monitor H/H, transfuse PRN    - Continue to monitor h&h and transfuse if hgb drop below 7        - Monitor stool outpt, serial abd exams.      Cystitis/Uti:    - UA large LE with pyuria.  Diffuse bladder wall thickening on CTap.  f/u urine cx - growing >100K proteus miralbilis.  Sensitive to rocephin      - Cont rocephin x 7 day course, now switched to po ceftin.      D/c planning.  No further ID w/u at this time.         Sharita Mishra  [phone]

## 2020-02-25 NOTE — DISCHARGE NOTE PROVIDER - CARE PROVIDER_API CALL
Idris Allred)  Internal Medicine; Nephrology  [address] [facility], [address]  [place] [address]  Phone: [phone]  Fax: [phone]  Follow Up Time: 2 weeks Idris Allred)  Internal Medicine; Nephrology  [address] [facility], [address]  [place] [address]  Phone: [phone]  Fax: [phone]  Follow Up Time: 2 weeks    Devin Hernandez)  Cardiovascular Disease; Internal Medicine  [address] [facility] [address]  [place] [address]  Phone: [phone]  Fax: [phone]  Follow Up Time:     Abhijeet Enriquez)  Gastroenterology; Internal Medicine  [address] [place] [address]  Phone: [phone]  Fax: [phone]  Follow Up Time:

## 2020-02-25 NOTE — PROGRESS NOTE ADULT - SUBJECTIVE AND OBJECTIVE BOX
Infectious Diseases progress note:    Subjective:  NAD, afebrile    ROS:  CONSTITUTIONAL:  No fever, chills, rigors  CARDIOVASCULAR:  No chest pain or palpitations  RESPIRATORY:   No SOB, cough, dyspnea on exertion.  No wheezing  GASTROINTESTINAL:  No abd pain, N/V, diarrhea/constipation  EXTREMITIES:  No swelling or joint pain  GENITOURINARY:  No burning on urination, increased frequency or urgency.  No flank pain  NEUROLOGIC:  No HA, visual disturbances  SKIN: No rashes    Allergies    Vasotec (Unknown)    Intolerances        ANTIBIOTICS/RELEVANT:  antimicrobials  cefuroxime   Tablet 250 milliGRAM(s) Oral daily    immunologic:    OTHER:  ALBUTerol    90 MICROgram(s) HFA Inhaler 2 Puff(s) Inhalation every 6 hours PRN  amLODIPine   Tablet 10 milliGRAM(s) Oral daily  atorvastatin 40 milliGRAM(s) Oral at bedtime  budesonide 160 MICROgram(s)/formoterol 4.5 MICROgram(s) Inhaler 2 Puff(s) Inhalation two times a day  cloNIDine 0.1 milliGRAM(s) Oral daily  dextrose 40% Gel 15 Gram(s) Oral once PRN  dextrose 50% Injectable 12.5 Gram(s) IV Push once  dextrose 50% Injectable 25 Gram(s) IV Push once  dextrose 50% Injectable 25 Gram(s) IV Push once  donepezil 5 milliGRAM(s) Oral at bedtime  glucagon  Injectable 1 milliGRAM(s) IntraMuscular once PRN  insulin lispro (HumaLOG) corrective regimen sliding scale   SubCutaneous three times a day before meals  insulin lispro (HumaLOG) corrective regimen sliding scale   SubCutaneous at bedtime  levothyroxine 125 MICROGram(s) Oral daily  metoprolol succinate ER 50 milliGRAM(s) Oral daily  pantoprazole    Tablet 40 milliGRAM(s) Oral before breakfast  PARoxetine 30 milliGRAM(s) Oral daily  QUEtiapine 100 milliGRAM(s) Oral at bedtime  senna 2 Tablet(s) Oral at bedtime      Objective:  Vital Signs Last 24 Hrs  T(C): 37.2 (25 Feb 2020 20:16), Max: 37.2 (25 Feb 2020 20:16)  T(F): 99 (25 Feb 2020 20:16), Max: 99 (25 Feb 2020 20:16)  HR: 56 (25 Feb 2020 20:16) (50 - 80)  BP: 118/59 (25 Feb 2020 20:16) (118/59 - 160/76)  BP(mean): --  RR: 18 (25 Feb 2020 20:16) (18 - 18)  SpO2: 98% (25 Feb 2020 20:16) (94% - 100%)    PHYSICAL EXAM:  Constitutional:NAD  Eyes:CAROLINE, EOMI  Ear/Nose/Throat: no thrush, mucositis.  Moist mucous membranes	  Neck:no JVD, no lymphadenopathy, supple  Respiratory: CTA bren  Cardiovascular: S1S2 RRR, no murmurs  Gastrointestinal:soft, nontender,  nondistended (+) BS  Extremities:no e/e/c  Skin:  no rashes, open wounds or ulcerations        LABS:                        8.5    9.06  )-----------( 285      ( 25 Feb 2020 06:29 )             28.4     02-25    137  |  102  |  37<H>  ----------------------------<  150<H>  4.4   |  26  |  1.62<H>    Ca    9.2      25 Feb 2020 06:26  Phos  3.4     02-24                              MICROBIOLOGY:          RADIOLOGY & ADDITIONAL STUDIES:

## 2020-02-25 NOTE — PROGRESS NOTE ADULT - SUBJECTIVE AND OBJECTIVE BOX
Patient is a 87y old  Female who presents with a chief complaint of LGI bleed (25 Feb 2020 11:52)      SUBJECTIVE / OVERNIGHT EVENTS: no new c/o. s/p GI bleed, now resolved, HH stable, DC planning    MEDICATIONS  (STANDING):  amLODIPine   Tablet 10 milliGRAM(s) Oral daily  atorvastatin 40 milliGRAM(s) Oral at bedtime  budesonide 160 MICROgram(s)/formoterol 4.5 MICROgram(s) Inhaler 2 Puff(s) Inhalation two times a day  cefuroxime   Tablet 250 milliGRAM(s) Oral daily  cloNIDine 0.1 milliGRAM(s) Oral daily  dextrose 50% Injectable 12.5 Gram(s) IV Push once  dextrose 50% Injectable 25 Gram(s) IV Push once  dextrose 50% Injectable 25 Gram(s) IV Push once  donepezil 5 milliGRAM(s) Oral at bedtime  insulin lispro (HumaLOG) corrective regimen sliding scale   SubCutaneous three times a day before meals  insulin lispro (HumaLOG) corrective regimen sliding scale   SubCutaneous at bedtime  levothyroxine 125 MICROGram(s) Oral daily  metoprolol succinate ER 50 milliGRAM(s) Oral daily  pantoprazole    Tablet 40 milliGRAM(s) Oral before breakfast  PARoxetine 30 milliGRAM(s) Oral daily  QUEtiapine 100 milliGRAM(s) Oral at bedtime  senna 2 Tablet(s) Oral at bedtime    MEDICATIONS  (PRN):  ALBUTerol    90 MICROgram(s) HFA Inhaler 2 Puff(s) Inhalation every 6 hours PRN Bronchospasm  dextrose 40% Gel 15 Gram(s) Oral once PRN Blood Glucose LESS THAN 70 milliGRAM(s)/deciliter  glucagon  Injectable 1 milliGRAM(s) IntraMuscular once PRN Glucose LESS THAN 70 milligrams/deciliter      Vital Signs Last 24 Hrs  T(F): 98.2 (02-25-20 @ 17:37), Max: 98.6 (02-25-20 @ 01:03)  HR: 55 (02-25-20 @ 17:37) (50 - 80)  BP: 158/72 (02-25-20 @ 17:37) (117/71 - 160/76)  RR: 18 (02-25-20 @ 17:37) (18 - 18)  SpO2: 95% (02-25-20 @ 17:37) (94% - 100%)  Telemetry:   CAPILLARY BLOOD GLUCOSE      POCT Blood Glucose.: 131 mg/dL (25 Feb 2020 17:30)  POCT Blood Glucose.: 137 mg/dL (25 Feb 2020 12:24)  POCT Blood Glucose.: 154 mg/dL (25 Feb 2020 08:57)  POCT Blood Glucose.: 214 mg/dL (24 Feb 2020 21:48)    I&O's Summary    24 Feb 2020 07:01  -  25 Feb 2020 07:00  --------------------------------------------------------  IN: 240 mL / OUT: 0 mL / NET: 240 mL        PHYSICAL EXAM:  GENERAL: NAD, well-developed  HEAD:  Atraumatic, Normocephalic  EYES: EOMI, PERRLA, conjunctiva and sclera clear  NECK: Supple, No JVD  CHEST/LUNG: Clear to auscultation bilaterally; No wheeze  HEART: Regular rate and rhythm; No murmurs, rubs, or gallops  ABDOMEN: Soft, Nontender, Nondistended; Bowel sounds present  EXTREMITIES:  2+ Peripheral Pulses, No clubbing, cyanosis, or edema  PSYCH: AAOx3  NEUROLOGY: non-focal  SKIN: No rashes or lesions    LABS:                        8.5    9.06  )-----------( 285      ( 25 Feb 2020 06:29 )             28.4     02-25    137  |  102  |  37<H>  ----------------------------<  150<H>  4.4   |  26  |  1.62<H>    Ca    9.2      25 Feb 2020 06:26  Phos  3.4     02-24                RADIOLOGY & ADDITIONAL TESTS:    Imaging Personally Reviewed:    Consultant(s) Notes Reviewed:      Care Discussed with Consultants/Other Providers:

## 2020-02-25 NOTE — DISCHARGE NOTE PROVIDER - NSDCCPCAREPLAN_GEN_ALL_CORE_FT
PRINCIPAL DISCHARGE DIAGNOSIS  Diagnosis: Acute lower GI bleeding  Assessment and Plan of Treatment: There are 2 common types of GI Bleed, Upper GI Bleed and Lower GI Bleed.  Upper GI Bleed affects the esophagus, stomach, and first part of the small intestine. Lower GI Bleed affects the colon and rectum.  Upper GI Bleed signs and symptoms to notify your Health Care Provider are vomiting blood, or coffee ground vomitus, and bowel movements that look like black tar.  Lower GI Bleed signs and symptoms to notify your health care provider are bright red bloody bowel movements.   Take your medications as prescribed by your Gastroenterologist.  If you have had an Endoscopy or Colonoscopy, follow up with your Gastroenterologist for Pathology results.  Avoid NSAIDs unless your Health Care Provider tells you that it is ok (Aspirin, Ibuprofen, Advil, Motrin, Aleve).  Follow up with your Gastroenterologist within 1-2 weeks of discharge.        SECONDARY DISCHARGE DIAGNOSES  Diagnosis: Congestive heart disease  Assessment and Plan of Treatment: Diastolic HF  Weigh yourself daily.  If you gain 3lbs in 3 days, or 5lbs in a week call your Health Care Provider.  Do not eat or drink foods containing more than 2000mg of salt (sodium) in your diet every day.  Call your Health Care Provider if you have any swelling or increased swelling in your feet, ankles, and/or stomach.  Take all of your medication as directed.  If you become dizzy call your Health Care Provider.      Diagnosis: COPD, mild  Assessment and Plan of Treatment: Call your Health Care provider upon arrival home to make a follow up appointment within one week.  Take all inhalers as prescribed by your Health Care Provider.  Take steroids as prescribed by your Health Care Provider.  If your cough increases infrequency and severity and/or you have shortness of breath or increased shortness of breath call your Health Care Provider.  If you develop fever, chills, night sweats, malaise, and/or change in mental status call your Health care Provider.  Nutrition is very important.  Eat small frequent meals.  Increase your activity as tolerated.  Do not stay in bed all day      Diagnosis: Benign essential HTN  Assessment and Plan of Treatment: Follow up with your medical doctor to establish long term blood pressure treatment goals. PRINCIPAL DISCHARGE DIAGNOSIS  Diagnosis: Acute lower GI bleeding  Assessment and Plan of Treatment: There are 2 common types of GI Bleed, Upper GI Bleed and Lower GI Bleed.  Upper GI Bleed affects the esophagus, stomach, and first part of the small intestine. Lower GI Bleed affects the colon and rectum.  Upper GI Bleed signs and symptoms to notify your Health Care Provider are vomiting blood, or coffee ground vomitus, and bowel movements that look like black tar.  Lower GI Bleed signs and symptoms to notify your health care provider are bright red bloody bowel movements.   Take your medications as prescribed by your Gastroenterologist.  If you have had an Endoscopy or Colonoscopy, follow up with your Gastroenterologist for Pathology results.  Avoid NSAIDs unless your Health Care Provider tells you that it is ok (Aspirin, Ibuprofen, Advil, Motrin, Aleve).  Follow up with your Gastroenterologist within 1-2 weeks of discharge.        SECONDARY DISCHARGE DIAGNOSES  Diagnosis: JANIE (acute kidney injury)  Assessment and Plan of Treatment: Avoid taking (NSAIDs) - (ex: Ibuprofen, Advil, Celebrex, Naprosyn)  Avoid taking any nephrotoxic agents (can harm kidneys) - Intravenous contrast for diagnostic testing, combination cold medications.  Have all medications adjusted for your renal function by your Health Care Provider.  Blood pressure control is important.  Take all medication as prescribed.      Diagnosis: Acute UTI  Assessment and Plan of Treatment: HOME CARE INSTRUCTIONS  f you were prescribed antibiotics, take them exactly as your caregiver instructs you. Finish the medication even if you feel better after you have only taken some of the medication.  Drink enough water and fluids to keep your urine clear or pale yellow.  Avoid caffeine, tea, and carbonated beverages. They tend to irritate your bladder.  Empty your bladder often. Avoid holding urine for long periods of time.  Empty your bladder before and after sexual intercourse.  After a bowel movement, women should cleanse from front to back. Use each tissue only once.  SEEK MEDICAL CARE IF:  You have back pain.  You develop a fever.  Your symptoms do not begin to resolve within 3 days.  SEEK IMMEDIATE MEDICAL CARE IF:  You have severe back pain or lower abdominal pain.  You develop chills.  You have nausea or vomiting.  You have continued burning or discomfort with urination.  Continue Ceftin for 2 more days      Diagnosis: Congestive heart disease  Assessment and Plan of Treatment: Diastolic HF  Weigh yourself daily.  If you gain 3lbs in 3 days, or 5lbs in a week call your Health Care Provider.  Do not eat or drink foods containing more than 2000mg of salt (sodium) in your diet every day.  Call your Health Care Provider if you have any swelling or increased swelling in your feet, ankles, and/or stomach.  Take all of your medication as directed.  If you become dizzy call your Health Care Provider.      Diagnosis: COPD, mild  Assessment and Plan of Treatment: Call your Health Care provider upon arrival home to make a follow up appointment within one week.  Take all inhalers as prescribed by your Health Care Provider.  Take steroids as prescribed by your Health Care Provider.  If your cough increases infrequency and severity and/or you have shortness of breath or increased shortness of breath call your Health Care Provider.  If you develop fever, chills, night sweats, malaise, and/or change in mental status call your Health care Provider.  Nutrition is very important.  Eat small frequent meals.  Increase your activity as tolerated.  Do not stay in bed all day      Diagnosis: Benign essential HTN  Assessment and Plan of Treatment: Follow up with your medical doctor to establish long term blood pressure treatment goals. PRINCIPAL DISCHARGE DIAGNOSIS  Diagnosis: Acute lower GI bleeding  Assessment and Plan of Treatment: There are 2 common types of GI Bleed, Upper GI Bleed and Lower GI Bleed.  Upper GI Bleed affects the esophagus, stomach, and first part of the small intestine. Lower GI Bleed affects the colon and rectum.  Upper GI Bleed signs and symptoms to notify your Health Care Provider are vomiting blood, or coffee ground vomitus, and bowel movements that look like black tar.  Lower GI Bleed signs and symptoms to notify your health care provider are bright red bloody bowel movements.   Take your medications as prescribed by your Gastroenterologist.  If you have had an Endoscopy or Colonoscopy, follow up with your Gastroenterologist for Pathology results.  Avoid NSAIDs unless your Health Care Provider tells you that it is ok (Aspirin, Ibuprofen, Advil, Motrin, Aleve).  Follow up with your Gastroenterologist within 1-2 weeks of discharge.        SECONDARY DISCHARGE DIAGNOSES  Diagnosis: JANIE (acute kidney injury)  Assessment and Plan of Treatment: Avoid taking (NSAIDs) - (ex: Ibuprofen, Advil, Celebrex, Naprosyn)  Avoid taking any nephrotoxic agents (can harm kidneys) - Intravenous contrast for diagnostic testing, combination cold medications.  Have all medications adjusted for your renal function by your Health Care Provider.  Blood pressure control is important.  Take all medication as prescribed.  - encourage/ feed  by mouth fluid 1 L/day.       Diagnosis: Acute UTI  Assessment and Plan of Treatment: HOME CARE INSTRUCTIONS  f you were prescribed antibiotics, take them exactly as your caregiver instructs you. Finish the medication even if you feel better after you have only taken some of the medication.  Drink enough water and fluids to keep your urine clear or pale yellow.  Avoid caffeine, tea, and carbonated beverages. They tend to irritate your bladder.  Empty your bladder often. Avoid holding urine for long periods of time.  Empty your bladder before and after sexual intercourse.  After a bowel movement, women should cleanse from front to back. Use each tissue only once.  SEEK MEDICAL CARE IF:  You have back pain.  You develop a fever.  Your symptoms do not begin to resolve within 3 days.  SEEK IMMEDIATE MEDICAL CARE IF:  You have severe back pain or lower abdominal pain.  You develop chills.  You have nausea or vomiting.  You have continued burning or discomfort with urination.  Continue Ceftin- last dose today      Diagnosis: Congestive heart disease  Assessment and Plan of Treatment: Diastolic HF  Weigh yourself daily.  If you gain 3lbs in 3 days, or 5lbs in a week call your Health Care Provider.  Do not eat or drink foods containing more than 2000mg of salt (sodium) in your diet every day.  Call your Health Care Provider if you have any swelling or increased swelling in your feet, ankles, and/or stomach.  Take all of your medication as directed.  If you become dizzy call your Health Care Provider.      Diagnosis: COPD, mild  Assessment and Plan of Treatment: Call your Health Care provider upon arrival home to make a follow up appointment within one week.  Take all inhalers as prescribed by your Health Care Provider.  Take steroids as prescribed by your Health Care Provider.  If your cough increases infrequency and severity and/or you have shortness of breath or increased shortness of breath call your Health Care Provider.  If you develop fever, chills, night sweats, malaise, and/or change in mental status call your Health care Provider.  Nutrition is very important.  Eat small frequent meals.  Increase your activity as tolerated.  Do not stay in bed all day      Diagnosis: Benign essential HTN  Assessment and Plan of Treatment: Follow up with your medical doctor to establish long term blood pressure treatment goals.

## 2020-02-26 VITALS
OXYGEN SATURATION: 98 % | SYSTOLIC BLOOD PRESSURE: 106 MMHG | TEMPERATURE: 98 F | DIASTOLIC BLOOD PRESSURE: 57 MMHG | HEART RATE: 52 BPM | RESPIRATION RATE: 18 BRPM

## 2020-02-26 LAB
ANION GAP SERPL CALC-SCNC: 12 MMOL/L — SIGNIFICANT CHANGE UP (ref 5–17)
BUN SERPL-MCNC: 32 MG/DL — HIGH (ref 7–23)
CALCIUM SERPL-MCNC: 8.8 MG/DL — SIGNIFICANT CHANGE UP (ref 8.4–10.5)
CHLORIDE SERPL-SCNC: 103 MMOL/L — SIGNIFICANT CHANGE UP (ref 96–108)
CO2 SERPL-SCNC: 23 MMOL/L — SIGNIFICANT CHANGE UP (ref 22–31)
CREAT SERPL-MCNC: 1.6 MG/DL — HIGH (ref 0.5–1.3)
GLUCOSE BLDC GLUCOMTR-MCNC: 131 MG/DL — HIGH (ref 70–99)
GLUCOSE BLDC GLUCOMTR-MCNC: 145 MG/DL — HIGH (ref 70–99)
GLUCOSE BLDC GLUCOMTR-MCNC: 153 MG/DL — HIGH (ref 70–99)
GLUCOSE BLDC GLUCOMTR-MCNC: 199 MG/DL — HIGH (ref 70–99)
GLUCOSE SERPL-MCNC: 131 MG/DL — HIGH (ref 70–99)
HCT VFR BLD CALC: 25.8 % — LOW (ref 34.5–45)
HGB BLD-MCNC: 8.2 G/DL — LOW (ref 11.5–15.5)
MAGNESIUM SERPL-MCNC: 2.2 MG/DL — SIGNIFICANT CHANGE UP (ref 1.6–2.6)
MCHC RBC-ENTMCNC: 26.6 PG — LOW (ref 27–34)
MCHC RBC-ENTMCNC: 31.8 GM/DL — LOW (ref 32–36)
MCV RBC AUTO: 83.8 FL — SIGNIFICANT CHANGE UP (ref 80–100)
NRBC # BLD: 0 /100 WBCS — SIGNIFICANT CHANGE UP (ref 0–0)
PHOSPHATE SERPL-MCNC: 3.7 MG/DL — SIGNIFICANT CHANGE UP (ref 2.5–4.5)
PLATELET # BLD AUTO: 283 K/UL — SIGNIFICANT CHANGE UP (ref 150–400)
POTASSIUM SERPL-MCNC: 4.6 MMOL/L — SIGNIFICANT CHANGE UP (ref 3.5–5.3)
POTASSIUM SERPL-SCNC: 4.6 MMOL/L — SIGNIFICANT CHANGE UP (ref 3.5–5.3)
RBC # BLD: 3.08 M/UL — LOW (ref 3.8–5.2)
RBC # FLD: 15.2 % — HIGH (ref 10.3–14.5)
SODIUM SERPL-SCNC: 138 MMOL/L — SIGNIFICANT CHANGE UP (ref 135–145)
WBC # BLD: 8.45 K/UL — SIGNIFICANT CHANGE UP (ref 3.8–10.5)
WBC # FLD AUTO: 8.45 K/UL — SIGNIFICANT CHANGE UP (ref 3.8–10.5)

## 2020-02-26 PROCEDURE — 93005 ELECTROCARDIOGRAM TRACING: CPT

## 2020-02-26 PROCEDURE — 82947 ASSAY GLUCOSE BLOOD QUANT: CPT

## 2020-02-26 PROCEDURE — 84100 ASSAY OF PHOSPHORUS: CPT

## 2020-02-26 PROCEDURE — 82803 BLOOD GASES ANY COMBINATION: CPT

## 2020-02-26 PROCEDURE — 74177 CT ABD & PELVIS W/CONTRAST: CPT

## 2020-02-26 PROCEDURE — 87086 URINE CULTURE/COLONY COUNT: CPT

## 2020-02-26 PROCEDURE — 82330 ASSAY OF CALCIUM: CPT

## 2020-02-26 PROCEDURE — 99238 HOSP IP/OBS DSCHRG MGMT 30/<: CPT

## 2020-02-26 PROCEDURE — 84295 ASSAY OF SERUM SODIUM: CPT

## 2020-02-26 PROCEDURE — 80053 COMPREHEN METABOLIC PANEL: CPT

## 2020-02-26 PROCEDURE — 84300 ASSAY OF URINE SODIUM: CPT

## 2020-02-26 PROCEDURE — 83036 HEMOGLOBIN GLYCOSYLATED A1C: CPT

## 2020-02-26 PROCEDURE — 80048 BASIC METABOLIC PNL TOTAL CA: CPT

## 2020-02-26 PROCEDURE — 71045 X-RAY EXAM CHEST 1 VIEW: CPT

## 2020-02-26 PROCEDURE — 86900 BLOOD TYPING SEROLOGIC ABO: CPT

## 2020-02-26 PROCEDURE — 82570 ASSAY OF URINE CREATININE: CPT

## 2020-02-26 PROCEDURE — 97161 PT EVAL LOW COMPLEX 20 MIN: CPT

## 2020-02-26 PROCEDURE — 86850 RBC ANTIBODY SCREEN: CPT

## 2020-02-26 PROCEDURE — 36430 TRANSFUSION BLD/BLD COMPNT: CPT

## 2020-02-26 PROCEDURE — P9016: CPT

## 2020-02-26 PROCEDURE — 84156 ASSAY OF PROTEIN URINE: CPT

## 2020-02-26 PROCEDURE — 97116 GAIT TRAINING THERAPY: CPT

## 2020-02-26 PROCEDURE — 85610 PROTHROMBIN TIME: CPT

## 2020-02-26 PROCEDURE — 87324 CLOSTRIDIUM AG IA: CPT

## 2020-02-26 PROCEDURE — 83880 ASSAY OF NATRIURETIC PEPTIDE: CPT

## 2020-02-26 PROCEDURE — 96374 THER/PROPH/DIAG INJ IV PUSH: CPT | Mod: XU

## 2020-02-26 PROCEDURE — 86923 COMPATIBILITY TEST ELECTRIC: CPT

## 2020-02-26 PROCEDURE — 84132 ASSAY OF SERUM POTASSIUM: CPT

## 2020-02-26 PROCEDURE — 84439 ASSAY OF FREE THYROXINE: CPT

## 2020-02-26 PROCEDURE — 82272 OCCULT BLD FECES 1-3 TESTS: CPT

## 2020-02-26 PROCEDURE — 84443 ASSAY THYROID STIM HORMONE: CPT

## 2020-02-26 PROCEDURE — 97530 THERAPEUTIC ACTIVITIES: CPT

## 2020-02-26 PROCEDURE — 87186 SC STD MICRODIL/AGAR DIL: CPT

## 2020-02-26 PROCEDURE — 85014 HEMATOCRIT: CPT

## 2020-02-26 PROCEDURE — 99285 EMERGENCY DEPT VISIT HI MDM: CPT | Mod: 25

## 2020-02-26 PROCEDURE — 94640 AIRWAY INHALATION TREATMENT: CPT

## 2020-02-26 PROCEDURE — 82435 ASSAY OF BLOOD CHLORIDE: CPT

## 2020-02-26 PROCEDURE — 85730 THROMBOPLASTIN TIME PARTIAL: CPT

## 2020-02-26 PROCEDURE — 82962 GLUCOSE BLOOD TEST: CPT

## 2020-02-26 PROCEDURE — 81001 URINALYSIS AUTO W/SCOPE: CPT

## 2020-02-26 PROCEDURE — 87507 IADNA-DNA/RNA PROBE TQ 12-25: CPT

## 2020-02-26 PROCEDURE — 85027 COMPLETE CBC AUTOMATED: CPT

## 2020-02-26 PROCEDURE — 87449 NOS EACH ORGANISM AG IA: CPT

## 2020-02-26 PROCEDURE — 83735 ASSAY OF MAGNESIUM: CPT

## 2020-02-26 PROCEDURE — 86901 BLOOD TYPING SEROLOGIC RH(D): CPT

## 2020-02-26 PROCEDURE — 83605 ASSAY OF LACTIC ACID: CPT

## 2020-02-26 RX ORDER — METOPROLOL TARTRATE 50 MG
25 TABLET ORAL DAILY
Refills: 0 | Status: DISCONTINUED | OUTPATIENT
Start: 2020-02-26 | End: 2020-02-26

## 2020-02-26 RX ADMIN — Medication 50 MILLIGRAM(S): at 05:15

## 2020-02-26 RX ADMIN — Medication 1: at 17:42

## 2020-02-26 RX ADMIN — Medication 250 MILLIGRAM(S): at 15:19

## 2020-02-26 RX ADMIN — BUDESONIDE AND FORMOTEROL FUMARATE DIHYDRATE 2 PUFF(S): 160; 4.5 AEROSOL RESPIRATORY (INHALATION) at 05:15

## 2020-02-26 RX ADMIN — AMLODIPINE BESYLATE 10 MILLIGRAM(S): 2.5 TABLET ORAL at 05:15

## 2020-02-26 RX ADMIN — PANTOPRAZOLE SODIUM 40 MILLIGRAM(S): 20 TABLET, DELAYED RELEASE ORAL at 05:15

## 2020-02-26 RX ADMIN — BUDESONIDE AND FORMOTEROL FUMARATE DIHYDRATE 2 PUFF(S): 160; 4.5 AEROSOL RESPIRATORY (INHALATION) at 17:42

## 2020-02-26 RX ADMIN — Medication 125 MICROGRAM(S): at 05:15

## 2020-02-26 RX ADMIN — SENNA PLUS 2 TABLET(S): 8.6 TABLET ORAL at 20:09

## 2020-02-26 RX ADMIN — QUETIAPINE FUMARATE 100 MILLIGRAM(S): 200 TABLET, FILM COATED ORAL at 20:09

## 2020-02-26 RX ADMIN — ATORVASTATIN CALCIUM 40 MILLIGRAM(S): 80 TABLET, FILM COATED ORAL at 20:09

## 2020-02-26 RX ADMIN — DONEPEZIL HYDROCHLORIDE 5 MILLIGRAM(S): 10 TABLET, FILM COATED ORAL at 20:09

## 2020-02-26 RX ADMIN — Medication 30 MILLIGRAM(S): at 15:19

## 2020-02-26 RX ADMIN — Medication 0.1 MILLIGRAM(S): at 05:14

## 2020-02-26 NOTE — CHART NOTE - NSCHARTNOTEFT_GEN_A_CORE
NO stools reported overnight. Pt without complaints. Medically cleared for discharge by Dr. Vazquez. Continue current medications, last day of Ceftin today. Social work arranged home aid . Follow up with PMD and GI

## 2020-02-26 NOTE — PROGRESS NOTE ADULT - SUBJECTIVE AND OBJECTIVE BOX
NEPHROLOGY[facility] [phone]        Patient seen and examined in bed.  She was in good spirits         MEDICATIONS  (STANDING):  amLODIPine   Tablet 10 milliGRAM(s) Oral daily  atorvastatin 40 milliGRAM(s) Oral at bedtime  budesonide 160 MICROgram(s)/formoterol 4.5 MICROgram(s) Inhaler 2 Puff(s) Inhalation two times a day  cefuroxime   Tablet 250 milliGRAM(s) Oral daily  cloNIDine 0.1 milliGRAM(s) Oral daily  dextrose 50% Injectable 12.5 Gram(s) IV Push once  dextrose 50% Injectable 25 Gram(s) IV Push once  dextrose 50% Injectable 25 Gram(s) IV Push once  donepezil 5 milliGRAM(s) Oral at bedtime  insulin lispro (HumaLOG) corrective regimen sliding scale   SubCutaneous three times a day before meals  insulin lispro (HumaLOG) corrective regimen sliding scale   SubCutaneous at bedtime  levothyroxine 125 MICROGram(s) Oral daily  metoprolol succinate ER 50 milliGRAM(s) Oral daily  pantoprazole    Tablet 40 milliGRAM(s) Oral before breakfast  PARoxetine 30 milliGRAM(s) Oral daily  QUEtiapine 100 milliGRAM(s) Oral at bedtime  senna 2 Tablet(s) Oral at bedtime      VITAL:  T(C): , Max: 37.2 (02-25-20 @ 20:16)  T(F): , Max: 99 (02-25-20 @ 20:16)  HR: 51 (02-26-20 @ 08:46)  BP: 103/63 (02-26-20 @ 08:46)  BP(mean): --  RR: 18 (02-26-20 @ 08:46)  SpO2: 97% (02-26-20 @ 08:46)  Wt(kg): --    I and O's:    02-25 @ 07:01  -  02-26 @ 07:00  --------------------------------------------------------  IN: 840 mL / OUT: 0 mL / NET: 840 mL          PHYSICAL EXAM:    Constitutional: NAD  Neck:  No JVD  Respiratory: CTAB/L  Cardiovascular: S1 and S2  Gastrointestinal: BS+, soft, NT/ND  Extremities: No peripheral edema  Neurological: A/O x 3, no focal deficits  Psychiatric: Normal mood, normal affect  : No Stephen  Skin: No rashes  Access: Not applicable    LABS:                        8.2    8.45  )-----------( 283      ( 26 Feb 2020 05:38 )             25.8     02-26    138  |  103  |  32<H>  ----------------------------<  131<H>  4.6   |  23  |  1.60<H>    Ca    8.8      26 Feb 2020 05:38  Phos  3.7     02-26  Mg     2.2     02-26            Urine Studies:          RADIOLOGY & ADDITIONAL STUDIES:

## 2020-02-26 NOTE — PROGRESS NOTE ADULT - ASSESSMENT
GIB  fu with GI   Monitor hemoglobin, transfuse as needed.    HTN  labile   HR on low side   dec toprol to 25     Diastolic CHF   chronic   stable   diuretics as needed     CKD  stable

## 2020-02-26 NOTE — PROGRESS NOTE ADULT - SUBJECTIVE AND OBJECTIVE BOX
Patient is a 87y old  Female who presents with a chief complaint of LGI bleed (26 Feb 2020 09:44)      SUBJECTIVE / OVERNIGHT EVENTS: no new events    MEDICATIONS  (STANDING):  amLODIPine   Tablet 10 milliGRAM(s) Oral daily  atorvastatin 40 milliGRAM(s) Oral at bedtime  budesonide 160 MICROgram(s)/formoterol 4.5 MICROgram(s) Inhaler 2 Puff(s) Inhalation two times a day  cloNIDine 0.1 milliGRAM(s) Oral daily  dextrose 50% Injectable 12.5 Gram(s) IV Push once  dextrose 50% Injectable 25 Gram(s) IV Push once  dextrose 50% Injectable 25 Gram(s) IV Push once  donepezil 5 milliGRAM(s) Oral at bedtime  insulin lispro (HumaLOG) corrective regimen sliding scale   SubCutaneous three times a day before meals  insulin lispro (HumaLOG) corrective regimen sliding scale   SubCutaneous at bedtime  levothyroxine 125 MICROGram(s) Oral daily  metoprolol succinate ER 25 milliGRAM(s) Oral daily  pantoprazole    Tablet 40 milliGRAM(s) Oral before breakfast  PARoxetine 30 milliGRAM(s) Oral daily  QUEtiapine 100 milliGRAM(s) Oral at bedtime  senna 2 Tablet(s) Oral at bedtime    MEDICATIONS  (PRN):  ALBUTerol    90 MICROgram(s) HFA Inhaler 2 Puff(s) Inhalation every 6 hours PRN Bronchospasm  dextrose 40% Gel 15 Gram(s) Oral once PRN Blood Glucose LESS THAN 70 milliGRAM(s)/deciliter  glucagon  Injectable 1 milliGRAM(s) IntraMuscular once PRN Glucose LESS THAN 70 milligrams/deciliter      Vital Signs Last 24 Hrs  T(F): 97.9 (02-26-20 @ 16:55), Max: 99 (02-25-20 @ 20:16)  HR: 52 (02-26-20 @ 16:55) (51 - 58)  BP: 106/57 (02-26-20 @ 16:55) (103/63 - 158/72)  RR: 18 (02-26-20 @ 16:55) (18 - 18)  SpO2: 98% (02-26-20 @ 16:55) (94% - 98%)  Telemetry:   CAPILLARY BLOOD GLUCOSE      POCT Blood Glucose.: 145 mg/dL (26 Feb 2020 12:34)  POCT Blood Glucose.: 131 mg/dL (26 Feb 2020 09:09)  POCT Blood Glucose.: 257 mg/dL (25 Feb 2020 21:40)  POCT Blood Glucose.: 131 mg/dL (25 Feb 2020 17:30)    I&O's Summary    25 Feb 2020 07:01  -  26 Feb 2020 07:00  --------------------------------------------------------  IN: 840 mL / OUT: 0 mL / NET: 840 mL        PHYSICAL EXAM:  GENERAL: NAD, well-developed  HEAD:  Atraumatic, Normocephalic  EYES: EOMI, PERRLA, conjunctiva and sclera clear  NECK: Supple, No JVD  CHEST/LUNG: Clear to auscultation bilaterally; No wheeze  HEART: Regular rate and rhythm; No murmurs, rubs, or gallops  ABDOMEN: Soft, Nontender, Nondistended; Bowel sounds present  EXTREMITIES:  2+ Peripheral Pulses, No clubbing, cyanosis, or edema  PSYCH: AAOx3  NEUROLOGY: non-focal  SKIN: No rashes or lesions    LABS:                        8.2    8.45  )-----------( 283      ( 26 Feb 2020 05:38 )             25.8     02-26    138  |  103  |  32<H>  ----------------------------<  131<H>  4.6   |  23  |  1.60<H>    Ca    8.8      26 Feb 2020 05:38  Phos  3.7     02-26  Mg     2.2     02-26                RADIOLOGY & ADDITIONAL TESTS:    Imaging Personally Reviewed:    Consultant(s) Notes Reviewed:      Care Discussed with Consultants/Other Providers:

## 2020-02-26 NOTE — PROGRESS NOTE ADULT - SUBJECTIVE AND OBJECTIVE BOX
Subjective: Patient seen and examined. No new events except as noted.     SUBJECTIVE/ROS:  NAD      MEDICATIONS:  MEDICATIONS  (STANDING):  amLODIPine   Tablet 10 milliGRAM(s) Oral daily  atorvastatin 40 milliGRAM(s) Oral at bedtime  budesonide 160 MICROgram(s)/formoterol 4.5 MICROgram(s) Inhaler 2 Puff(s) Inhalation two times a day  cefuroxime   Tablet 250 milliGRAM(s) Oral daily  cloNIDine 0.1 milliGRAM(s) Oral daily  dextrose 50% Injectable 12.5 Gram(s) IV Push once  dextrose 50% Injectable 25 Gram(s) IV Push once  dextrose 50% Injectable 25 Gram(s) IV Push once  donepezil 5 milliGRAM(s) Oral at bedtime  insulin lispro (HumaLOG) corrective regimen sliding scale   SubCutaneous three times a day before meals  insulin lispro (HumaLOG) corrective regimen sliding scale   SubCutaneous at bedtime  levothyroxine 125 MICROGram(s) Oral daily  metoprolol succinate ER 50 milliGRAM(s) Oral daily  pantoprazole    Tablet 40 milliGRAM(s) Oral before breakfast  PARoxetine 30 milliGRAM(s) Oral daily  QUEtiapine 100 milliGRAM(s) Oral at bedtime  senna 2 Tablet(s) Oral at bedtime      PHYSICAL EXAM:  T(C): 37.2 (02-26-20 @ 08:46), Max: 37.2 (02-25-20 @ 20:16)  HR: 51 (02-26-20 @ 08:46) (50 - 58)  BP: 103/63 (02-26-20 @ 08:46) (103/63 - 158/72)  RR: 18 (02-26-20 @ 08:46) (18 - 18)  SpO2: 97% (02-26-20 @ 08:46) (94% - 98%)  Wt(kg): --  I&O's Summary    25 Feb 2020 07:01  -  26 Feb 2020 07:00  --------------------------------------------------------  IN: 840 mL / OUT: 0 mL / NET: 840 mL            JVP: Normal  Neck: supple  Lung: clear   CV: S1 S2 , Murmur:  Abd: soft  Ext: No edema  neuro: Awake / alert  Psych: flat affect  Skin: normal``    LABS/DATA:    CARDIAC MARKERS:                                8.2    8.45  )-----------( 283      ( 26 Feb 2020 05:38 )             25.8     02-26    138  |  103  |  32<H>  ----------------------------<  131<H>  4.6   |  23  |  1.60<H>    Ca    8.8      26 Feb 2020 05:38  Phos  3.7     02-26  Mg     2.2     02-26      proBNP:   Lipid Profile:   HgA1c:   TSH:     TELE:  EKG:

## 2020-02-26 NOTE — PROGRESS NOTE ADULT - REASON FOR ADMISSION
LGI bleed

## 2020-02-26 NOTE — PROGRESS NOTE ADULT - ASSESSMENT
Ms. Aljeandra is an 86 yo lady with PMH of  breast CA, HTN, COPD, T2DM, diastolic CHF, hypothyroidism, presented with BRBPR;    CKD stage 3.     1. CKD stage 3:   2. Acute blood loss anemia. Hemoglobin is 8.5  3. HTN   4. Diffuse wall thickening with possible cystitis.   5. GI Bleed resolving.     RECOMMEND:      - Conservative management with respect to GI tract    - encourage/ feed  by mouth fluid 1 L/day.     - Renal dose all medication for GFR < 45 ml/min.   - Give blood transfusion for Hemoglobin < 7.0.   - BMP, CBC, Magnesium and phosphorus daily.  -If BP remains low then reduce the norvasc to 5mg po qd     ? DC planning

## 2021-03-22 NOTE — PROVIDER CONTACT NOTE (CRITICAL VALUE NOTIFICATION) - ASSESSMENT
Patient AO1-2, denies abd pain, CP, SOB, palpitatoins, dizziness, + clots in stool, dark red, VSS. normal S1, S2 heard

## 2021-09-07 ENCOUNTER — INPATIENT (INPATIENT)
Facility: HOSPITAL | Age: 86
LOS: 2 days | Discharge: HOME HEALTH | DRG: 378 | End: 2021-09-10
Attending: INTERNAL MEDICINE | Admitting: INTERNAL MEDICINE
Payer: MEDICARE

## 2021-09-07 VITALS
HEIGHT: 60 IN | WEIGHT: 160.06 LBS | TEMPERATURE: 98 F | HEART RATE: 64 BPM | RESPIRATION RATE: 16 BRPM | DIASTOLIC BLOOD PRESSURE: 86 MMHG | SYSTOLIC BLOOD PRESSURE: 162 MMHG | OXYGEN SATURATION: 97 %

## 2021-09-07 DIAGNOSIS — E11.9 TYPE 2 DIABETES MELLITUS WITHOUT COMPLICATIONS: ICD-10-CM

## 2021-09-07 DIAGNOSIS — I10 ESSENTIAL (PRIMARY) HYPERTENSION: ICD-10-CM

## 2021-09-07 DIAGNOSIS — K92.2 GASTROINTESTINAL HEMORRHAGE, UNSPECIFIED: ICD-10-CM

## 2021-09-07 DIAGNOSIS — E03.9 HYPOTHYROIDISM, UNSPECIFIED: ICD-10-CM

## 2021-09-07 DIAGNOSIS — J44.9 CHRONIC OBSTRUCTIVE PULMONARY DISEASE, UNSPECIFIED: ICD-10-CM

## 2021-09-07 DIAGNOSIS — F03.90 UNSPECIFIED DEMENTIA WITHOUT BEHAVIORAL DISTURBANCE: ICD-10-CM

## 2021-09-07 LAB
ALBUMIN SERPL ELPH-MCNC: 3.5 G/DL — SIGNIFICANT CHANGE UP (ref 3.3–5)
ALBUMIN SERPL ELPH-MCNC: 4.3 G/DL — SIGNIFICANT CHANGE UP (ref 3.3–5)
ALP SERPL-CCNC: 79 U/L — SIGNIFICANT CHANGE UP (ref 40–120)
ALP SERPL-CCNC: 88 U/L — SIGNIFICANT CHANGE UP (ref 40–120)
ALT FLD-CCNC: 10 U/L — SIGNIFICANT CHANGE UP (ref 10–45)
ALT FLD-CCNC: 14 U/L — SIGNIFICANT CHANGE UP (ref 10–45)
ANION GAP SERPL CALC-SCNC: 13 MMOL/L — SIGNIFICANT CHANGE UP (ref 5–17)
ANION GAP SERPL CALC-SCNC: 23 MMOL/L — HIGH (ref 5–17)
APTT BLD: 23.6 SEC — LOW (ref 27.5–35.5)
AST SERPL-CCNC: 17 U/L — SIGNIFICANT CHANGE UP (ref 10–40)
AST SERPL-CCNC: 26 U/L — SIGNIFICANT CHANGE UP (ref 10–40)
BASOPHILS # BLD AUTO: 0.03 K/UL — SIGNIFICANT CHANGE UP (ref 0–0.2)
BASOPHILS # BLD AUTO: 0.05 K/UL — SIGNIFICANT CHANGE UP (ref 0–0.2)
BASOPHILS # BLD AUTO: 0.05 K/UL — SIGNIFICANT CHANGE UP (ref 0–0.2)
BASOPHILS NFR BLD AUTO: 0.3 % — SIGNIFICANT CHANGE UP (ref 0–2)
BASOPHILS NFR BLD AUTO: 0.4 % — SIGNIFICANT CHANGE UP (ref 0–2)
BASOPHILS NFR BLD AUTO: 0.5 % — SIGNIFICANT CHANGE UP (ref 0–2)
BILIRUB SERPL-MCNC: 0.2 MG/DL — SIGNIFICANT CHANGE UP (ref 0.2–1.2)
BILIRUB SERPL-MCNC: 0.2 MG/DL — SIGNIFICANT CHANGE UP (ref 0.2–1.2)
BLD GP AB SCN SERPL QL: NEGATIVE — SIGNIFICANT CHANGE UP
BUN SERPL-MCNC: 28 MG/DL — HIGH (ref 7–23)
BUN SERPL-MCNC: 28 MG/DL — HIGH (ref 7–23)
CALCIUM SERPL-MCNC: 10.7 MG/DL — HIGH (ref 8.4–10.5)
CALCIUM SERPL-MCNC: 9.8 MG/DL — SIGNIFICANT CHANGE UP (ref 8.4–10.5)
CHLORIDE SERPL-SCNC: 98 MMOL/L — SIGNIFICANT CHANGE UP (ref 96–108)
CHLORIDE SERPL-SCNC: 99 MMOL/L — SIGNIFICANT CHANGE UP (ref 96–108)
CO2 SERPL-SCNC: 22 MMOL/L — SIGNIFICANT CHANGE UP (ref 22–31)
CO2 SERPL-SCNC: 24 MMOL/L — SIGNIFICANT CHANGE UP (ref 22–31)
CREAT SERPL-MCNC: 1.1 MG/DL — SIGNIFICANT CHANGE UP (ref 0.5–1.3)
CREAT SERPL-MCNC: 1.11 MG/DL — SIGNIFICANT CHANGE UP (ref 0.5–1.3)
EOSINOPHIL # BLD AUTO: 0.06 K/UL — SIGNIFICANT CHANGE UP (ref 0–0.5)
EOSINOPHIL # BLD AUTO: 0.07 K/UL — SIGNIFICANT CHANGE UP (ref 0–0.5)
EOSINOPHIL # BLD AUTO: 0.11 K/UL — SIGNIFICANT CHANGE UP (ref 0–0.5)
EOSINOPHIL NFR BLD AUTO: 0.6 % — SIGNIFICANT CHANGE UP (ref 0–6)
EOSINOPHIL NFR BLD AUTO: 0.6 % — SIGNIFICANT CHANGE UP (ref 0–6)
EOSINOPHIL NFR BLD AUTO: 1.1 % — SIGNIFICANT CHANGE UP (ref 0–6)
GLUCOSE BLDC GLUCOMTR-MCNC: 147 MG/DL — HIGH (ref 70–99)
GLUCOSE BLDC GLUCOMTR-MCNC: 160 MG/DL — HIGH (ref 70–99)
GLUCOSE SERPL-MCNC: 172 MG/DL — HIGH (ref 70–99)
GLUCOSE SERPL-MCNC: 180 MG/DL — HIGH (ref 70–99)
HCT VFR BLD CALC: 33 % — LOW (ref 34.5–45)
HCT VFR BLD CALC: 33.6 % — LOW (ref 34.5–45)
HCT VFR BLD CALC: 34.2 % — LOW (ref 34.5–45)
HGB BLD-MCNC: 10.4 G/DL — LOW (ref 11.5–15.5)
HGB BLD-MCNC: 10.6 G/DL — LOW (ref 11.5–15.5)
HGB BLD-MCNC: 10.8 G/DL — LOW (ref 11.5–15.5)
IMM GRANULOCYTES NFR BLD AUTO: 0.3 % — SIGNIFICANT CHANGE UP (ref 0–1.5)
IMM GRANULOCYTES NFR BLD AUTO: 0.3 % — SIGNIFICANT CHANGE UP (ref 0–1.5)
IMM GRANULOCYTES NFR BLD AUTO: 0.4 % — SIGNIFICANT CHANGE UP (ref 0–1.5)
INR BLD: 0.99 RATIO — SIGNIFICANT CHANGE UP (ref 0.88–1.16)
LYMPHOCYTES # BLD AUTO: 1.6 K/UL — SIGNIFICANT CHANGE UP (ref 1–3.3)
LYMPHOCYTES # BLD AUTO: 1.76 K/UL — SIGNIFICANT CHANGE UP (ref 1–3.3)
LYMPHOCYTES # BLD AUTO: 14.4 % — SIGNIFICANT CHANGE UP (ref 13–44)
LYMPHOCYTES # BLD AUTO: 17.1 % — SIGNIFICANT CHANGE UP (ref 13–44)
LYMPHOCYTES # BLD AUTO: 2.09 K/UL — SIGNIFICANT CHANGE UP (ref 1–3.3)
LYMPHOCYTES # BLD AUTO: 21.7 % — SIGNIFICANT CHANGE UP (ref 13–44)
MCHC RBC-ENTMCNC: 25.7 PG — LOW (ref 27–34)
MCHC RBC-ENTMCNC: 25.9 PG — LOW (ref 27–34)
MCHC RBC-ENTMCNC: 25.9 PG — LOW (ref 27–34)
MCHC RBC-ENTMCNC: 31.5 GM/DL — LOW (ref 32–36)
MCHC RBC-ENTMCNC: 31.5 GM/DL — LOW (ref 32–36)
MCHC RBC-ENTMCNC: 31.6 GM/DL — LOW (ref 32–36)
MCV RBC AUTO: 81.4 FL — SIGNIFICANT CHANGE UP (ref 80–100)
MCV RBC AUTO: 82 FL — SIGNIFICANT CHANGE UP (ref 80–100)
MCV RBC AUTO: 82.1 FL — SIGNIFICANT CHANGE UP (ref 80–100)
MONOCYTES # BLD AUTO: 0.64 K/UL — SIGNIFICANT CHANGE UP (ref 0–0.9)
MONOCYTES # BLD AUTO: 0.71 K/UL — SIGNIFICANT CHANGE UP (ref 0–0.9)
MONOCYTES # BLD AUTO: 0.77 K/UL — SIGNIFICANT CHANGE UP (ref 0–0.9)
MONOCYTES NFR BLD AUTO: 5.3 % — SIGNIFICANT CHANGE UP (ref 2–14)
MONOCYTES NFR BLD AUTO: 7.4 % — SIGNIFICANT CHANGE UP (ref 2–14)
MONOCYTES NFR BLD AUTO: 8.3 % — SIGNIFICANT CHANGE UP (ref 2–14)
NEUTROPHILS # BLD AUTO: 6.65 K/UL — SIGNIFICANT CHANGE UP (ref 1.8–7.4)
NEUTROPHILS # BLD AUTO: 6.84 K/UL — SIGNIFICANT CHANGE UP (ref 1.8–7.4)
NEUTROPHILS # BLD AUTO: 9.62 K/UL — HIGH (ref 1.8–7.4)
NEUTROPHILS NFR BLD AUTO: 69 % — SIGNIFICANT CHANGE UP (ref 43–77)
NEUTROPHILS NFR BLD AUTO: 73.4 % — SIGNIFICANT CHANGE UP (ref 43–77)
NEUTROPHILS NFR BLD AUTO: 78.9 % — HIGH (ref 43–77)
NRBC # BLD: 0 /100 WBCS — SIGNIFICANT CHANGE UP (ref 0–0)
OB PNL STL: POSITIVE
PLATELET # BLD AUTO: 218 K/UL — SIGNIFICANT CHANGE UP (ref 150–400)
PLATELET # BLD AUTO: 278 K/UL — SIGNIFICANT CHANGE UP (ref 150–400)
PLATELET # BLD AUTO: 318 K/UL — SIGNIFICANT CHANGE UP (ref 150–400)
POTASSIUM SERPL-MCNC: 4 MMOL/L — SIGNIFICANT CHANGE UP (ref 3.5–5.3)
POTASSIUM SERPL-MCNC: 5.5 MMOL/L — HIGH (ref 3.5–5.3)
POTASSIUM SERPL-SCNC: 4 MMOL/L — SIGNIFICANT CHANGE UP (ref 3.5–5.3)
POTASSIUM SERPL-SCNC: 5.5 MMOL/L — HIGH (ref 3.5–5.3)
PROT SERPL-MCNC: 7.5 G/DL — SIGNIFICANT CHANGE UP (ref 6–8.3)
PROT SERPL-MCNC: 8.1 G/DL — SIGNIFICANT CHANGE UP (ref 6–8.3)
PROTHROM AB SERPL-ACNC: 11.9 SEC — SIGNIFICANT CHANGE UP (ref 10.6–13.6)
RBC # BLD: 4.02 M/UL — SIGNIFICANT CHANGE UP (ref 3.8–5.2)
RBC # BLD: 4.13 M/UL — SIGNIFICANT CHANGE UP (ref 3.8–5.2)
RBC # BLD: 4.17 M/UL — SIGNIFICANT CHANGE UP (ref 3.8–5.2)
RBC # FLD: 14 % — SIGNIFICANT CHANGE UP (ref 10.3–14.5)
RBC # FLD: 14.2 % — SIGNIFICANT CHANGE UP (ref 10.3–14.5)
RBC # FLD: 14.2 % — SIGNIFICANT CHANGE UP (ref 10.3–14.5)
RH IG SCN BLD-IMP: POSITIVE — SIGNIFICANT CHANGE UP
SARS-COV-2 RNA SPEC QL NAA+PROBE: SIGNIFICANT CHANGE UP
SODIUM SERPL-SCNC: 136 MMOL/L — SIGNIFICANT CHANGE UP (ref 135–145)
SODIUM SERPL-SCNC: 143 MMOL/L — SIGNIFICANT CHANGE UP (ref 135–145)
WBC # BLD: 12.19 K/UL — HIGH (ref 3.8–10.5)
WBC # BLD: 9.33 K/UL — SIGNIFICANT CHANGE UP (ref 3.8–10.5)
WBC # BLD: 9.64 K/UL — SIGNIFICANT CHANGE UP (ref 3.8–10.5)
WBC # FLD AUTO: 12.19 K/UL — HIGH (ref 3.8–10.5)
WBC # FLD AUTO: 9.33 K/UL — SIGNIFICANT CHANGE UP (ref 3.8–10.5)
WBC # FLD AUTO: 9.64 K/UL — SIGNIFICANT CHANGE UP (ref 3.8–10.5)

## 2021-09-07 PROCEDURE — 74177 CT ABD & PELVIS W/CONTRAST: CPT | Mod: 26,MA

## 2021-09-07 PROCEDURE — 99285 EMERGENCY DEPT VISIT HI MDM: CPT | Mod: 25

## 2021-09-07 PROCEDURE — 93010 ELECTROCARDIOGRAM REPORT: CPT

## 2021-09-07 PROCEDURE — 71045 X-RAY EXAM CHEST 1 VIEW: CPT | Mod: 26

## 2021-09-07 RX ORDER — METOPROLOL TARTRATE 50 MG
50 TABLET ORAL DAILY
Refills: 0 | Status: DISCONTINUED | OUTPATIENT
Start: 2021-09-07 | End: 2021-09-10

## 2021-09-07 RX ORDER — AMLODIPINE BESYLATE 2.5 MG/1
5 TABLET ORAL ONCE
Refills: 0 | Status: COMPLETED | OUTPATIENT
Start: 2021-09-07 | End: 2021-09-07

## 2021-09-07 RX ORDER — SODIUM CHLORIDE 9 MG/ML
1000 INJECTION INTRAMUSCULAR; INTRAVENOUS; SUBCUTANEOUS
Refills: 0 | Status: DISCONTINUED | OUTPATIENT
Start: 2021-09-07 | End: 2021-09-08

## 2021-09-07 RX ORDER — SODIUM CHLORIDE 9 MG/ML
1000 INJECTION, SOLUTION INTRAVENOUS
Refills: 0 | Status: DISCONTINUED | OUTPATIENT
Start: 2021-09-07 | End: 2021-09-10

## 2021-09-07 RX ORDER — PANTOPRAZOLE SODIUM 20 MG/1
40 TABLET, DELAYED RELEASE ORAL EVERY 12 HOURS
Refills: 0 | Status: DISCONTINUED | OUTPATIENT
Start: 2021-09-07 | End: 2021-09-08

## 2021-09-07 RX ORDER — LEVOTHYROXINE SODIUM 125 MCG
125 TABLET ORAL DAILY
Refills: 0 | Status: DISCONTINUED | OUTPATIENT
Start: 2021-09-07 | End: 2021-09-10

## 2021-09-07 RX ORDER — ALBUTEROL 90 UG/1
2 AEROSOL, METERED ORAL EVERY 6 HOURS
Refills: 0 | Status: DISCONTINUED | OUTPATIENT
Start: 2021-09-07 | End: 2021-09-07

## 2021-09-07 RX ORDER — DONEPEZIL HYDROCHLORIDE 10 MG/1
5 TABLET, FILM COATED ORAL AT BEDTIME
Refills: 0 | Status: DISCONTINUED | OUTPATIENT
Start: 2021-09-07 | End: 2021-09-10

## 2021-09-07 RX ORDER — DEXTROSE 50 % IN WATER 50 %
15 SYRINGE (ML) INTRAVENOUS ONCE
Refills: 0 | Status: DISCONTINUED | OUTPATIENT
Start: 2021-09-07 | End: 2021-09-10

## 2021-09-07 RX ORDER — INSULIN LISPRO 100/ML
VIAL (ML) SUBCUTANEOUS
Refills: 0 | Status: DISCONTINUED | OUTPATIENT
Start: 2021-09-07 | End: 2021-09-10

## 2021-09-07 RX ORDER — PANTOPRAZOLE SODIUM 20 MG/1
40 TABLET, DELAYED RELEASE ORAL
Refills: 0 | Status: DISCONTINUED | OUTPATIENT
Start: 2021-09-07 | End: 2021-09-07

## 2021-09-07 RX ORDER — DEXTROSE 50 % IN WATER 50 %
25 SYRINGE (ML) INTRAVENOUS ONCE
Refills: 0 | Status: DISCONTINUED | OUTPATIENT
Start: 2021-09-07 | End: 2021-09-10

## 2021-09-07 RX ORDER — DEXTROSE 50 % IN WATER 50 %
12.5 SYRINGE (ML) INTRAVENOUS ONCE
Refills: 0 | Status: DISCONTINUED | OUTPATIENT
Start: 2021-09-07 | End: 2021-09-10

## 2021-09-07 RX ORDER — AMLODIPINE BESYLATE 2.5 MG/1
5 TABLET ORAL DAILY
Refills: 0 | Status: DISCONTINUED | OUTPATIENT
Start: 2021-09-07 | End: 2021-09-10

## 2021-09-07 RX ORDER — QUETIAPINE FUMARATE 200 MG/1
100 TABLET, FILM COATED ORAL AT BEDTIME
Refills: 0 | Status: DISCONTINUED | OUTPATIENT
Start: 2021-09-07 | End: 2021-09-10

## 2021-09-07 RX ORDER — ATORVASTATIN CALCIUM 80 MG/1
40 TABLET, FILM COATED ORAL AT BEDTIME
Refills: 0 | Status: DISCONTINUED | OUTPATIENT
Start: 2021-09-07 | End: 2021-09-10

## 2021-09-07 RX ORDER — FUROSEMIDE 40 MG
40 TABLET ORAL
Refills: 0 | Status: DISCONTINUED | OUTPATIENT
Start: 2021-09-07 | End: 2021-09-07

## 2021-09-07 RX ORDER — FERROUS SULFATE 325(65) MG
325 TABLET ORAL DAILY
Refills: 0 | Status: DISCONTINUED | OUTPATIENT
Start: 2021-09-07 | End: 2021-09-10

## 2021-09-07 RX ORDER — ALBUTEROL 90 UG/1
2 AEROSOL, METERED ORAL EVERY 6 HOURS
Refills: 0 | Status: DISCONTINUED | OUTPATIENT
Start: 2021-09-07 | End: 2021-09-10

## 2021-09-07 RX ORDER — GLUCAGON INJECTION, SOLUTION 0.5 MG/.1ML
1 INJECTION, SOLUTION SUBCUTANEOUS ONCE
Refills: 0 | Status: DISCONTINUED | OUTPATIENT
Start: 2021-09-07 | End: 2021-09-10

## 2021-09-07 RX ADMIN — Medication 0.1 MILLIGRAM(S): at 12:36

## 2021-09-07 RX ADMIN — PANTOPRAZOLE SODIUM 40 MILLIGRAM(S): 20 TABLET, DELAYED RELEASE ORAL at 18:16

## 2021-09-07 RX ADMIN — DONEPEZIL HYDROCHLORIDE 5 MILLIGRAM(S): 10 TABLET, FILM COATED ORAL at 21:32

## 2021-09-07 RX ADMIN — ATORVASTATIN CALCIUM 40 MILLIGRAM(S): 80 TABLET, FILM COATED ORAL at 21:32

## 2021-09-07 RX ADMIN — SODIUM CHLORIDE 60 MILLILITER(S): 9 INJECTION INTRAMUSCULAR; INTRAVENOUS; SUBCUTANEOUS at 16:07

## 2021-09-07 RX ADMIN — Medication 1: at 18:16

## 2021-09-07 RX ADMIN — QUETIAPINE FUMARATE 100 MILLIGRAM(S): 200 TABLET, FILM COATED ORAL at 21:33

## 2021-09-07 RX ADMIN — AMLODIPINE BESYLATE 5 MILLIGRAM(S): 2.5 TABLET ORAL at 12:36

## 2021-09-07 NOTE — H&P ADULT - NSHPPHYSICALEXAM_GEN_ALL_CORE
PHYSICAL EXAM: vital signs noted on [place]  in no apparent distress  HEENT: CAROLINE EOMI  Neck: Supple, no JVD, no thyromegaly  Lungs: no wheeze, no crackles  CVS: S1 S2 soft ejection systolic murmur +   Abdomen: no tenderness, no organomegaly, BS present  Neuro: Alert no focal   Skin: warm, dry  Ext: no cyanosis or clubbing, no edema  Msk: no joint swelling or deformities  Back: no CVA tenderness, no kyphosis/scoliosis

## 2021-09-07 NOTE — H&P ADULT - ASSESSMENT
87 yo female PMH of LGIB 2/2 colitis/diverticular bleeding, CKD, CHF and Alzheimer's who presents to the ED via EMS for melena admitted for work up and observation

## 2021-09-07 NOTE — H&P ADULT - HISTORY OF PRESENT ILLNESS
89 yo female PMH of LGIB 2/2 colitis/diverticular bleeding, CKD, CHF and Alzheimer's who presents to the ED via EMS for melena. Patient's daughter was changing the pt about 1 hr PTA. Dark stool mixed with blood was noted in the pt's diaper. Daughter called EMS following this. No other symptoms. The patient is mentating at baseline per daughter. Admission 02/20 for LGIB diagnosed as colitis/diverticular bleeding. Not on any anticoagulation

## 2021-09-07 NOTE — CONSULT NOTE ADULT - SUBJECTIVE AND OBJECTIVE BOX
NEPHROLOGY - NSN    Patient seen and examined.    HPI:  87 yo female PMH of LGIB 2/2 colitis/diverticular bleeding, CKD, CHF and Alzheimer's who presents to the ED via EMS for melena. Patient's daughter was changing the pt about 1 hr PTA. Dark stool mixed with blood was noted in the pt's diaper. Daughter called EMS following this. No other symptoms. The patient is mentating at baseline per daughter. Admission 02/20 for LGIB diagnosed as colitis/diverticular bleeding. Not on any anticoagulation (07 Sep 2021 14:04)  Recently pt cognition is getting worse and she is now sleeping most of the time and does not interact much  EF is about 45% but there is no recent echo  She has some COPD and PHTN but breathing issues have not been an issue recently   She has CKD stage3 at baseline  About 5-6 yrs ago was the last scope and there was a mass and it was not intervened upon given her age and co morbilities       PAST MEDICAL & SURGICAL HISTORY:  Hypertension    History of Hypothyroidism    Diabetes Mellitus Type II    Dementia    Depression    Chronic Obstructive Pulmonary Disease (COPD)    Chronic Diastolic Heart Failure    S/P total B/LKnee Replacement        MEDICATIONS  (STANDING):  amLODIPine   Tablet 5 milliGRAM(s) Oral daily  atorvastatin 40 milliGRAM(s) Oral at bedtime  cloNIDine 0.1 milliGRAM(s) Oral daily  dextrose 40% Gel 15 Gram(s) Oral once  dextrose 5%. 1000 milliLiter(s) (50 mL/Hr) IV Continuous <Continuous>  dextrose 5%. 1000 milliLiter(s) (100 mL/Hr) IV Continuous <Continuous>  dextrose 50% Injectable 25 Gram(s) IV Push once  dextrose 50% Injectable 12.5 Gram(s) IV Push once  dextrose 50% Injectable 25 Gram(s) IV Push once  donepezil 5 milliGRAM(s) Oral at bedtime  ferrous    sulfate 325 milliGRAM(s) Oral daily  furosemide    Tablet 40 milliGRAM(s) Oral <User Schedule>  glucagon  Injectable 1 milliGRAM(s) IntraMuscular once  insulin lispro (ADMELOG) corrective regimen sliding scale   SubCutaneous three times a day before meals  levothyroxine 125 MICROGram(s) Oral daily  metoprolol succinate ER 50 milliGRAM(s) Oral daily  pantoprazole  Injectable 40 milliGRAM(s) IV Push every 12 hours  PARoxetine 30 milliGRAM(s) Oral daily  QUEtiapine 100 milliGRAM(s) Oral at bedtime      Allergies    Vasotec (Unknown)    Intolerances        SOCIAL HISTORY:  Denies alcohol abuse, drug abuse or tobacco usage.     FAMILY HISTORY:  No pertinent family history in first degree relatives        VITALS:  T(C): 36.9 (09-07-21 @ 12:00), Max: 37.1 (09-07-21 @ 08:00)  HR: 65 (09-07-21 @ 12:00) (62 - 65)  BP: 189/82 (09-07-21 @ 12:00) (162/86 - 189/82)  RR: 16 (09-07-21 @ 12:00) (16 - 20)  SpO2: 98% (09-07-21 @ 12:00) (97% - 98%)    REVIEW OF SYSTEMS:  Denies any nausea, vomiting, diarrhea, fever or chills. Denies chest pain, SOB, focal weakness, hematuria or dysuria. Good oral intake and denies fatigue or weakness. All other pertinent systems are reviewed and are negative.    PHYSICAL EXAM:  Constitutional: NAD  HEENT: EOMI  Neck:  No JVD, supple   Respiratory: CTA B/L  Cardiovascular: S1 and S2, RRR  Gastrointestinal: + BS, soft, NT, ND  Extremities: No peripheral edema, + peripheral pulses  Neurological: A/O x 3, CN2-12 intact  Psychiatric: Normal mood, normal affect  : No Stephen  Skin: No rashes, C/D/I  Access: Not applicable    I and O's:    Height (cm): 152.4 (09-07 @ 06:58)  Weight (kg): 72.6 (09-07 @ 06:58)  BMI (kg/m2): 31.3 (09-07 @ 06:58)  BSA (m2): 1.7 (09-07 @ 06:58)    LABS:                        10.4   12.19 )-----------( 318      ( 07 Sep 2021 12:21 )             33.0     09-07    143  |  98  |  28<H>  ----------------------------<  180<H>  4.0   |  22  |  1.10    Ca    10.7<H>      07 Sep 2021 12:21    TPro  8.1  /  Alb  4.3  /  TBili  0.2  /  DBili  x   /  AST  17  /  ALT  10  /  AlkPhos  88  09-07      URINE:      RADIOLOGY & ADDITIONAL STUDIES:    < from: CT Abdomen and Pelvis w/ IV Cont (09.07.21 @ 10:52) >    EXAM:  CT ABDOMEN AND PELVIS IC                            PROCEDURE DATE:  09/07/2021            INTERPRETATION:  CLINICAL INFORMATION: Bloody stools. Evaluate for source.    COMPARISON: CT abdomen pelvis 2/17/2020    CONTRAST/COMPLICATIONS:  IV Contrast: Omnipaque 350  90 cc administered   10 cc discarded  Oral Contrast: None.  Complications: None reported at time of study completion    PROCEDURE:  CT of the Abdomen and Pelvis was performed.  Precontrast, Arterial and Delayed phases were performed.  Sagittal and coronal reformats were performed.    FINDINGS:  LOWER CHEST: Trace left-sided pleural effusion. Aortic and coronary artery calcifications.    LIVER: Within normal limits.  BILE DUCTS: Normal caliber.  GALLBLADDER: Within normal limits.  SPLEEN: Subcentimeter hyperenhancing nodule in the spleen, unchanged since comparison study.  PANCREAS: Within normal limits.  ADRENALS: Within normal limits.  KIDNEYS/URETERS: Bilateral renal hypodensities too small to characterize. No hydronephrosis. No hydroureter.    BLADDER: Within normal limits.  REPRODUCTIVE ORGANS: Uterus and adnexa within normal limits.    BOWEL: No bowel obstruction. Extensive colonic diverticuli without evidence of diverticulosis. Appendix is not visualized.  PERITONEUM: No ascites.  VESSELS: Atherosclerotic changes. No visualized source of bleeding. The celiac, SMA, and WHIT and their associated visualized branches are patent.  RETROPERITONEUM/LYMPH NODES: No lymphadenopathy.  ABDOMINAL WALL: Small fat containing umbilical hernia.  BONES: Degenerative changes. Redemonstrated sclerotic focus of the L1 posterior inferior vertebral body.    IMPRESSION:  No visualized source of gastrointestinal bleed.    No acute intra-abdominal or intrapelvic pathology demonstrated.    --- End of Report ---              MIKE CERVANTES MD; Resident Radiology  This document has been electronically signed.  KENN WINTER MD; Attending Radiologist  This document has been electronically signed. Sep  7 2021 12:28PM    < end of copied text >  
[facility]  Tacos Dowell PA-C  [facility] [place] [address]  [phone]      Chief Complaint:  Patient is a 88y old  Female who presents with a chief complaint of melena (07 Sep 2021 15:16)      HPI:87 yo female PMH of LGIB 2/2 colitis/diverticular bleeding, CKD, CHF and Alzheimer's who presents to the ED via EMS for melena. Patient's daughter was changing the pt about 1 hr PTA. Dark stool mixed with blood was noted in the pt's diaper. Daughter called EMS following this. No other symptoms. The patient is mentating at baseline per daughter. Admission 02/20 for LGIB diagnosed as colitis/diverticular bleeding. Not on any anticoagulation    Allergies:  Vasotec (Unknown)      Medications:  ALBUTerol    90 MICROgram(s) HFA Inhaler 2 Puff(s) Inhalation every 6 hours PRN  amLODIPine   Tablet 5 milliGRAM(s) Oral daily  atorvastatin 40 milliGRAM(s) Oral at bedtime  cloNIDine 0.1 milliGRAM(s) Oral daily  dextrose 40% Gel 15 Gram(s) Oral once  dextrose 5%. 1000 milliLiter(s) IV Continuous <Continuous>  dextrose 5%. 1000 milliLiter(s) IV Continuous <Continuous>  dextrose 50% Injectable 25 Gram(s) IV Push once  dextrose 50% Injectable 12.5 Gram(s) IV Push once  dextrose 50% Injectable 25 Gram(s) IV Push once  donepezil 5 milliGRAM(s) Oral at bedtime  ferrous    sulfate 325 milliGRAM(s) Oral daily  glucagon  Injectable 1 milliGRAM(s) IntraMuscular once  insulin lispro (ADMELOG) corrective regimen sliding scale   SubCutaneous three times a day before meals  levothyroxine 125 MICROGram(s) Oral daily  metoprolol succinate ER 50 milliGRAM(s) Oral daily  pantoprazole  Injectable 40 milliGRAM(s) IV Push every 12 hours  PARoxetine 30 milliGRAM(s) Oral daily  QUEtiapine 100 milliGRAM(s) Oral at bedtime  sodium chloride 0.9%. 1000 milliLiter(s) IV Continuous <Continuous>      PMHX/PSHX:  Hypertension    History of Hypothyroidism    Diabetes Mellitus Type II    Dementia    Depression    Chronic Obstructive Pulmonary Disease (COPD)    Chronic Diastolic Heart Failure    S/P total B/LKnee Replacement        Family history:  No pertinent family history in first degree relatives        Social History:     ROS:     unable to obtain        PHYSICAL EXAM:   Vital Signs:  Vital Signs Last 24 Hrs  T(C): 36.5 (07 Sep 2021 15:39), Max: 37.1 (07 Sep 2021 08:00)  T(F): 97.7 (07 Sep 2021 15:39), Max: 98.7 (07 Sep 2021 08:00)  HR: 63 (07 Sep 2021 15:39) (62 - 65)  BP: 137/80 (07 Sep 2021 15:39) (137/80 - 189/82)  BP(mean): --  RR: 18 (07 Sep 2021 15:39) (16 - 20)  SpO2: 99% (07 Sep 2021 15:39) (97% - 99%)  Daily Height in cm: 152.4 (07 Sep 2021 06:58)    Daily     nad  confused  frail  non toxic  soft, nt  no edema      LABS:                        10.4   12.19 )-----------( 318      ( 07 Sep 2021 12:21 )             33.0     09-07    143  |  98  |  28<H>  ----------------------------<  180<H>  4.0   |  22  |  1.10    Ca    10.7<H>      07 Sep 2021 12:21    TPro  8.1  /  Alb  4.3  /  TBili  0.2  /  DBili  x   /  AST  17  /  ALT  10  /  AlkPhos  88  09-07    LIVER FUNCTIONS - ( 07 Sep 2021 12:21 )  Alb: 4.3 g/dL / Pro: 8.1 g/dL / ALK PHOS: 88 U/L / ALT: 10 U/L / AST: 17 U/L / GGT: x           PT/INR - ( 07 Sep 2021 08:08 )   PT: 11.9 sec;   INR: 0.99 ratio         PTT - ( 07 Sep 2021 08:08 )  PTT:23.6 sec        Imaging:

## 2021-09-07 NOTE — H&P ADULT - PROBLEM SELECTOR PLAN 1
hemoglobin stable  hemodynamically stable  GI evaluation   unlikely to benefit from invasive work up  Type and screen  transfusion if needed  hold all anticoagulation

## 2021-09-07 NOTE — ED PROVIDER NOTE - PHYSICAL EXAMINATION
Gen: No acute distress. We changed her diaper without problems.   Head: normal appearing  HEENT: normal conjunctiva  Lung: no respiratory distress, speaking in full sentences     CV: regular rate and rhythm  Abd:   - Rectal: Large volume dark stool mixed with red blood filling the pt's diaper. Guaiac sample sent for heme testing.    MSK: no visible deformities  Neuro: Alert. Seems to acknowledge verbal questions but does not give comprehensible verbal responses, she responds w/ some mumbled words. Gross movement of the extremities are intact.   Skin: Areas of skin breakdown over the sacrum, grade 2. No redness, purulence or tenderness of the areas.

## 2021-09-07 NOTE — ED ADULT NURSE REASSESSMENT NOTE - NS ED NURSE REASSESS COMMENT FT1
Daughter at bedside, updated on plan of care. Daughter informed nurse that she is going to call the pt's primary care doctor at this time to notify him that pt is in ED.  Pt resting comfortably in bed, diaper is still clean no blood noted.

## 2021-09-07 NOTE — ED PROVIDER NOTE - OBJECTIVE STATEMENT
Rancho Alejandra is a 89 yo female w/ a hx of LGIB 2/2 colitis/diverticular bleeding, CKD, CHF and Alzheimers, who presents to the ED via EMS for melena. Patient's daughter was changing the pt about 1 hr PTA. Dark stool mixed with blood was noted in the pt's diaper. Daughter called EMS following this. No other symptoms. Pt mentating at baseline per daughter. Admission 02/20 for LGIB diagnosed as colitis/diverticular bleeding. Not on blood thinners.

## 2021-09-07 NOTE — ED PROCEDURE NOTE - PROCEDURE ADDITIONAL DETAILS
Peripheral IV access in the Emergency Department obtained under dynamic ultrasound guidance with dark nonpulsatile blood return.  Catheter was flushed afterwards without any resistance or resulting extravasation.  IV catheter confirmed in compressible vein after insertion. 18 gauge catheter placed in a peripheral vein in the right upper extremity. Cannulation was successful on the 2nd attempt.
Emergency Department Focused Ultrasound performed at patient's bedside for educational purposes. An appropriate follow up study is ordered. -Patrick Gao PA-C

## 2021-09-07 NOTE — CONSULT NOTE ADULT - ASSESSMENT
rectal bleed   acute blood loss anemia    suspected resolving diverticular bleed  ct scan reviewed  hgb now appears stable  cont clears  case d/w daughter over phone who appeared upset and did not want to discuss mothers care with me as "she already spoke to the other doctors"  no plan for endoscopic workup  will follow
89 yo female PMH of LGIB 2/2 colitis/diverticular bleeding, CKD, CHF and Alzheimer's who presents to the ED via EMS for melena  CKD stage 3   HTN urgency  Likely lower gi bleed  Hypercalcemia(but also elevated serum alb)     1 GI-Supportive care unless there is elicia melena or BRBPR given her age and comorbidities;  GI evaluation   2 Renal-Assess for AUGUSTUS sp recent contrast CT scan;  Hold Lasix;  Start gentle hydration   3 CVS-BP is elevated but may be the stress of the hospital.  As outpt her BP is stable and average SBP is 140mmHg systolic  4 Endo-Cont Synthroid    [facility]   [facility]   [phone]

## 2021-09-07 NOTE — ED PROVIDER NOTE - ATTENDING CONTRIBUTION TO CARE
Attending Statement (JONG Ardon MD):    HPI: 87y/o F with h/o dementia, CHF (HFpEF based on TTE 2015, EF 65%), CKD (baseline Cr recently 1.4-1.8), and prior lower GI bleeding 2/2 colitis/diverticular bleed (2/2020), presenting to ED today via EMS for evaluation of dark bloody stool in diaper noted today; no reported AC use. Baseline is pleasant but poor memory (at baseline currently). No reported fever/chills, vomiting; patient not c/o abdominal pain, chest pain, difficulty breathing or other complaints.  No reported antiplatelet or anticoagulant use.    Review of Systems:  -limited by underlying dementia; see hpi    PSH/PMH as noted above    On Physical Exam:  General: well appearing, awake/alert, in NAD  HEENT: anicteric, MMM  Neck: no neck tenderness  Cardiac: s1s2  Lungs: CTABL  Abdomen: soft nontender/nondistended  -rectal examination with staff: large amount of dark red bloody stools  : no bladder tenderness or distension  Skin: intact, no rash  Extremities: no peripheral edema    MDM: 87y/o F with h/o dementia, CHF (HFpEF based on TTE 2015, EF 65%), CKD (baseline Cr recently 1.4-1.8), and prior lower GI bleeding 2/2 colitis/diverticular bleed (2/2020), presenting to ED today via EMS for evaluation of dark bloody stool in diaper noted today; no AC/ASA use; is hypertensive (not hypotensive or tachycardic); well appearing; concern for GI bleeding likely lower given presentation and history; will check labs: cbc (to evaluate for leukocytosis or anemia), CMP (to evaluate for electrolyte abnormalities or renal/liver dysfunction), pt/inr (eval for coagulopathy), and type/screen (to eval for possible transfusion); covid 19 pcr (screening); screening CXR.  Consider CT A/P to evaluate for diverticulitis/colitis given history and limited ability of patient to communicate.  Likely to require admission go GI bleeding.

## 2021-09-07 NOTE — ED PROVIDER NOTE - PROGRESS NOTE DETAILS
CT was negative for acute pathology. Anemia at baseline., stable on 4 hr repeat. Elevated BUN w/ normal Cr, consistent w/ GIB. No overt e/o of acute ischemia on EKG. Patient w/ stable vitals aside from hypertension, tx w/ home medications. Clinical status unchanged. Pt's daughter is at bedside and was updated.  Plan is to admit to medicine team for inpatient management of GIB, colonoscopy versus empiric therapy for rectal bleeding of unconfirmed origin. I spoke w/ Dr. Alek Allred, pt's PCP, and he is agreeable w/ the plan. Admission will be under Dr. Melendrez.

## 2021-09-07 NOTE — ED ADULT NURSE NOTE - OBJECTIVE STATEMENT
89 y/o female presents to ED via EMS from home c/o bloody loose stool this AM. Per EMS, daughter was helping pt to use commode this AM when she noticed a large amount of bloody stool and called EMS. Pt arrives in diaper which is completely saturated with a large amount dark, bloody stool, spilling out from sides. PMH of colitis, ckd, dementia, chf. A&Ox1 to person, abd distended but nontender, no edema, skin warm dry and intact no breakdown noted. Pt changed into clean hospital gown, bed linens changed and pt cleaned, clean diaper applied. Pt in position of comfort. VSS.

## 2021-09-07 NOTE — H&P ADULT - NSHPREVIEWOFSYSTEMS_GEN_ALL_CORE
per daughter   Gen: no loss of wt no loss of appetite  ENT: no dizziness no hearing loss  Ophth: no blurring of vision no loss of vision  Resp: No cough no sputum production  CVS: No chest pain no palpitations no orthopnea  GI: no nausea, vomiting see above HPI   : no dysuria, hematuria  Endo: no polyuria no excessive sweating  Neuro: no weakness no paresthesias  Heme: No petechiae no easy bruising  Msk: No joint pain no swelling  Skin: No rash no itching

## 2021-09-07 NOTE — PATIENT PROFILE ADULT - SPECIFY REASON UNABLE TO ASSESS:
POD #1 S/P R TKA  - Unfortunately c/o severe pain  - Was able to amb 80ft with PT but c/o 10/10 pain throughout  
Patient is being monitored in the hospital after having undergone total knee arthroplasty.  She has hypertension and diabetes.  Has been in her usual state of health lately.  Currently she complains of uncontrolled pain.  No other unusual complaints.  
unable to assess, patient confused

## 2021-09-07 NOTE — ED PROVIDER NOTE - CLINICAL SUMMARY MEDICAL DECISION MAKING FREE TEXT BOX
89 yo female w/ a known hx of colitis/diverticular bleeding presents w/ lower GI bleeding. Hemodynamically stable. Dxd = diverticular bleeding in a pt w/ known hx of same > ischemia/infarction of the colon, hemorrhagic diarrheal infection, colonic adenocarcinoma, rectal fissure. Obtain H+H, coags and basic electrolytes. Type and screen sent. Monitor vitals and blood counts, will transfuse as indicated. 89 yo female w/ a known hx of colitis/diverticular bleeding presents w/ lower GI bleeding. Hemodynamically stable. Dxd = diverticular bleeding in a pt w/ known hx of same > ischemia/infarction of the colon, hemorrhagic diarrheal infection, colonic adenocarcinoma, rectal fissure. Obtain H+H, coags and basic electrolytes. Type and screen sent. Monitor vitals and blood counts, will transfuse as indicated.    Attending note (Duglas): the above statement reflects the resident's MDM.  Please see my attending statement for detailed information regarding my medical decision making.

## 2021-09-08 LAB
A1C WITH ESTIMATED AVERAGE GLUCOSE RESULT: 7.6 % — HIGH (ref 4–5.6)
ANION GAP SERPL CALC-SCNC: 16 MMOL/L — SIGNIFICANT CHANGE UP (ref 5–17)
BUN SERPL-MCNC: 32 MG/DL — HIGH (ref 7–23)
CALCIUM SERPL-MCNC: 9.4 MG/DL — SIGNIFICANT CHANGE UP (ref 8.4–10.5)
CHLORIDE SERPL-SCNC: 100 MMOL/L — SIGNIFICANT CHANGE UP (ref 96–108)
CO2 SERPL-SCNC: 21 MMOL/L — LOW (ref 22–31)
COVID-19 SPIKE DOMAIN AB INTERP: POSITIVE
COVID-19 SPIKE DOMAIN ANTIBODY RESULT: 76.6 U/ML — HIGH
CREAT SERPL-MCNC: 1.24 MG/DL — SIGNIFICANT CHANGE UP (ref 0.5–1.3)
ESTIMATED AVERAGE GLUCOSE: 171 MG/DL — HIGH (ref 68–114)
GLUCOSE BLDC GLUCOMTR-MCNC: 130 MG/DL — HIGH (ref 70–99)
GLUCOSE BLDC GLUCOMTR-MCNC: 149 MG/DL — HIGH (ref 70–99)
GLUCOSE BLDC GLUCOMTR-MCNC: 153 MG/DL — HIGH (ref 70–99)
GLUCOSE BLDC GLUCOMTR-MCNC: 184 MG/DL — HIGH (ref 70–99)
GLUCOSE SERPL-MCNC: 141 MG/DL — HIGH (ref 70–99)
HCT VFR BLD CALC: 30.8 % — LOW (ref 34.5–45)
HGB BLD-MCNC: 9.5 G/DL — LOW (ref 11.5–15.5)
MCHC RBC-ENTMCNC: 25.5 PG — LOW (ref 27–34)
MCHC RBC-ENTMCNC: 30.8 GM/DL — LOW (ref 32–36)
MCV RBC AUTO: 82.6 FL — SIGNIFICANT CHANGE UP (ref 80–100)
NRBC # BLD: 0 /100 WBCS — SIGNIFICANT CHANGE UP (ref 0–0)
PLATELET # BLD AUTO: 197 K/UL — SIGNIFICANT CHANGE UP (ref 150–400)
POTASSIUM SERPL-MCNC: 5.1 MMOL/L — SIGNIFICANT CHANGE UP (ref 3.5–5.3)
POTASSIUM SERPL-SCNC: 5.1 MMOL/L — SIGNIFICANT CHANGE UP (ref 3.5–5.3)
RBC # BLD: 3.73 M/UL — LOW (ref 3.8–5.2)
RBC # FLD: 14.5 % — SIGNIFICANT CHANGE UP (ref 10.3–14.5)
SARS-COV-2 IGG+IGM SERPL QL IA: 76.6 U/ML — HIGH
SARS-COV-2 IGG+IGM SERPL QL IA: POSITIVE
SODIUM SERPL-SCNC: 137 MMOL/L — SIGNIFICANT CHANGE UP (ref 135–145)
TSH SERPL-MCNC: 1.48 UIU/ML — SIGNIFICANT CHANGE UP (ref 0.27–4.2)
VIT B12 SERPL-MCNC: 387 PG/ML — SIGNIFICANT CHANGE UP (ref 232–1245)
WBC # BLD: 9.52 K/UL — SIGNIFICANT CHANGE UP (ref 3.8–10.5)
WBC # FLD AUTO: 9.52 K/UL — SIGNIFICANT CHANGE UP (ref 3.8–10.5)

## 2021-09-08 RX ORDER — SODIUM CHLORIDE 9 MG/ML
1000 INJECTION INTRAMUSCULAR; INTRAVENOUS; SUBCUTANEOUS
Refills: 0 | Status: DISCONTINUED | OUTPATIENT
Start: 2021-09-08 | End: 2021-09-09

## 2021-09-08 RX ORDER — PANTOPRAZOLE SODIUM 20 MG/1
40 TABLET, DELAYED RELEASE ORAL
Refills: 0 | Status: DISCONTINUED | OUTPATIENT
Start: 2021-09-08 | End: 2021-09-10

## 2021-09-08 RX ADMIN — DONEPEZIL HYDROCHLORIDE 5 MILLIGRAM(S): 10 TABLET, FILM COATED ORAL at 22:33

## 2021-09-08 RX ADMIN — QUETIAPINE FUMARATE 100 MILLIGRAM(S): 200 TABLET, FILM COATED ORAL at 22:35

## 2021-09-08 RX ADMIN — SODIUM CHLORIDE 60 MILLILITER(S): 9 INJECTION INTRAMUSCULAR; INTRAVENOUS; SUBCUTANEOUS at 08:29

## 2021-09-08 RX ADMIN — SODIUM CHLORIDE 30 MILLILITER(S): 9 INJECTION INTRAMUSCULAR; INTRAVENOUS; SUBCUTANEOUS at 22:36

## 2021-09-08 RX ADMIN — AMLODIPINE BESYLATE 5 MILLIGRAM(S): 2.5 TABLET ORAL at 06:31

## 2021-09-08 RX ADMIN — Medication 50 MILLIGRAM(S): at 06:32

## 2021-09-08 RX ADMIN — ATORVASTATIN CALCIUM 40 MILLIGRAM(S): 80 TABLET, FILM COATED ORAL at 22:34

## 2021-09-08 RX ADMIN — Medication 0.1 MILLIGRAM(S): at 06:31

## 2021-09-08 RX ADMIN — SODIUM CHLORIDE 60 MILLILITER(S): 9 INJECTION INTRAMUSCULAR; INTRAVENOUS; SUBCUTANEOUS at 06:33

## 2021-09-08 RX ADMIN — Medication 325 MILLIGRAM(S): at 11:31

## 2021-09-08 RX ADMIN — PANTOPRAZOLE SODIUM 40 MILLIGRAM(S): 20 TABLET, DELAYED RELEASE ORAL at 17:12

## 2021-09-08 RX ADMIN — PANTOPRAZOLE SODIUM 40 MILLIGRAM(S): 20 TABLET, DELAYED RELEASE ORAL at 06:31

## 2021-09-08 RX ADMIN — Medication 1: at 08:28

## 2021-09-08 RX ADMIN — Medication 30 MILLIGRAM(S): at 11:31

## 2021-09-08 RX ADMIN — Medication 125 MICROGRAM(S): at 06:31

## 2021-09-08 NOTE — PHYSICAL THERAPY INITIAL EVALUATION ADULT - ADDITIONAL COMMENTS
Pt lethargic unable to provided social hx. Tried calling daughter Jessica Quach [phone] however no answer, left voicemail. Spoke with daughter Jessica Quach [phone], who states pt was in the bed most hours of the day and required total assist with ALL ADL's and functional mobility PTA. At times pt does not want to get out of bed, they use bedpan. States they have HHA 10 hours/7days a week. Pt family state she owns RW, inquiring about new commode and w/c.

## 2021-09-08 NOTE — ADVANCED PRACTICE NURSE CONSULT - REASON FOR CONSULT
Requested by staff to assess skin status of pt a/w a pressure injury. PMH is noted:  87 yo female PMH of LGIB 2/2 colitis/diverticular bleeding, CKD, CHF and Alzheimer's who presents to the ED via EMS for melena. Patient's daughter was changing the pt about 1 hr PTA. Dark stool mixed with blood was noted in the pt's diaper. Daughter called EMS following this. No other symptoms. The patient is mentating at baseline per daughter. Admission 02/20 for LGIB diagnosed as colitis/diverticular bleeding. Not on any anticoagulation  PAST MEDICAL HISTORY:  Chronic Diastolic Heart Failure     Chronic Obstructive Pulmonary Disease (COPD)     Dementia     Depression     Diabetes Mellitus Type II     History of Hypothyroidism     Hypertension.     PAST SURGICAL HISTORY:  S/P total B/LKnee Replacement.

## 2021-09-08 NOTE — ADVANCED PRACTICE NURSE CONSULT - ASSESSMENT
The pt was encountered on [facility]- her daughter Jessica was at the bedside. Education was provided as to care of the skin when managing incontinence- we discussed using disposable underpads versus briefs. We discussed barriers that can be applied to protect the skin. We also suggested that the pt receive a Bariatric commode as Jessica mentioned that the pt was uncomfortable on the standard commode at home and it "hurts her skin."  staff had applied a Prima-Fit catheter to divert urine from the skin.  Upon assessment, the pt presents with healed hypopigmented areas of skin on the b/l buttocks and in the perirectal area. On the left buttocks is a small open wound measuring 2.5cmx 1.5cmx 0cm- with deep red maroon tissue. Suggest that there may be a component of deep tissue injury- will recommend to continue to monitor and to apply Savage with pericare.  The pt is on a low air-loss surface being turned and positioned- will recommend Complete Cair boots for off-loading.  As the pt is a/w a pressure injury, a nutrition consult is pending for further evaluation.

## 2021-09-08 NOTE — PHYSICAL THERAPY INITIAL EVALUATION ADULT - PERTINENT HX OF CURRENT PROBLEM, REHAB EVAL
89 yo female PMH of LGIB 2/2 colitis/diverticular bleeding, CKD, CHF and Alzheimer's who presents to the ED via EMS for melena. Patient's daughter was changing the pt about 1 hr PTA. Dark stool mixed with blood was noted in the pt's diaper. Daughter called EMS following this. No other symptoms. The patient is mentating at baseline per daughter. Admission 02/20 for LGIB diagnosed as colitis/diverticular bleeding. Not on any anticoagulation.

## 2021-09-08 NOTE — PHYSICAL THERAPY INITIAL EVALUATION ADULT - GENERAL OBSERVATIONS, REHAB EVAL
Pt received semi-supine in bed, A&Ox1 (name only), +IVL, +primafit, kathie 45 min PT eval. Pt following 25% of commands, lethargic, requiring redirection and VC to maintain eye opening during session.

## 2021-09-08 NOTE — PHYSICAL THERAPY INITIAL EVALUATION ADULT - DISCHARGE DISPOSITION, PT EVAL
TBD on functional eval Spoke with daughter Jessica Quach [phone], who states pt was in the bed most hours of the day and required total assist with ALL ADL's and functional mobility PTA. States they have [facility] 10 hours/7days a week./home/no skilled PT needs

## 2021-09-08 NOTE — ADVANCED PRACTICE NURSE CONSULT - RECOMMEDATIONS
Will recommend the followin. B/l buttocks: continue to monitor, routine pericare with Savage  2, Continue with turning and positioning  3. continue with Prima-Fit  4. Nutrition support as pt condition allows  Tx plan discussed with RN

## 2021-09-08 NOTE — PHYSICAL THERAPY INITIAL EVALUATION ADULT - PRECAUTIONS/LIMITATIONS, REHAB EVAL
CONT: CXR: Patchy airspace disease in the left mid to lower lung, possibly atelectasis or pneumonia. CONT: CXR: Patchy airspace disease in the left mid to lower lung, possibly atelectasis or pneumonia./fall precautions

## 2021-09-08 NOTE — PHYSICAL THERAPY INITIAL EVALUATION ADULT - GAIT TRAINING, PT EVAL
GOAL: Pt will ambulate 50 feet w/independence, w/use of appropriate assistive device in 2 weeks GOAL: Pt will ambulate 10 feet w/independence, w/use of appropriate assistive device in 2 weeks

## 2021-09-09 LAB
ANION GAP SERPL CALC-SCNC: 13 MMOL/L — SIGNIFICANT CHANGE UP (ref 5–17)
BUN SERPL-MCNC: 33 MG/DL — HIGH (ref 7–23)
CALCIUM SERPL-MCNC: 9.2 MG/DL — SIGNIFICANT CHANGE UP (ref 8.4–10.5)
CHLORIDE SERPL-SCNC: 101 MMOL/L — SIGNIFICANT CHANGE UP (ref 96–108)
CO2 SERPL-SCNC: 21 MMOL/L — LOW (ref 22–31)
CREAT SERPL-MCNC: 1.37 MG/DL — HIGH (ref 0.5–1.3)
GLUCOSE BLDC GLUCOMTR-MCNC: 124 MG/DL — HIGH (ref 70–99)
GLUCOSE BLDC GLUCOMTR-MCNC: 163 MG/DL — HIGH (ref 70–99)
GLUCOSE BLDC GLUCOMTR-MCNC: 169 MG/DL — HIGH (ref 70–99)
GLUCOSE BLDC GLUCOMTR-MCNC: 285 MG/DL — HIGH (ref 70–99)
GLUCOSE SERPL-MCNC: 148 MG/DL — HIGH (ref 70–99)
HCT VFR BLD CALC: 26.6 % — LOW (ref 34.5–45)
HGB BLD-MCNC: 8.5 G/DL — LOW (ref 11.5–15.5)
MCHC RBC-ENTMCNC: 25.9 PG — LOW (ref 27–34)
MCHC RBC-ENTMCNC: 32 GM/DL — SIGNIFICANT CHANGE UP (ref 32–36)
MCV RBC AUTO: 81.1 FL — SIGNIFICANT CHANGE UP (ref 80–100)
NRBC # BLD: 0 /100 WBCS — SIGNIFICANT CHANGE UP (ref 0–0)
PLATELET # BLD AUTO: 257 K/UL — SIGNIFICANT CHANGE UP (ref 150–400)
POTASSIUM SERPL-MCNC: 3.5 MMOL/L — SIGNIFICANT CHANGE UP (ref 3.5–5.3)
POTASSIUM SERPL-SCNC: 3.5 MMOL/L — SIGNIFICANT CHANGE UP (ref 3.5–5.3)
RBC # BLD: 3.28 M/UL — LOW (ref 3.8–5.2)
RBC # FLD: 14 % — SIGNIFICANT CHANGE UP (ref 10.3–14.5)
SODIUM SERPL-SCNC: 135 MMOL/L — SIGNIFICANT CHANGE UP (ref 135–145)
WBC # BLD: 7.57 K/UL — SIGNIFICANT CHANGE UP (ref 3.8–10.5)
WBC # FLD AUTO: 7.57 K/UL — SIGNIFICANT CHANGE UP (ref 3.8–10.5)

## 2021-09-09 RX ORDER — PREGABALIN 225 MG/1
1000 CAPSULE ORAL ONCE
Refills: 0 | Status: COMPLETED | OUTPATIENT
Start: 2021-09-09 | End: 2021-09-10

## 2021-09-09 RX ORDER — PREGABALIN 225 MG/1
1000 CAPSULE ORAL DAILY
Refills: 0 | Status: DISCONTINUED | OUTPATIENT
Start: 2021-09-09 | End: 2021-09-10

## 2021-09-09 RX ADMIN — SODIUM CHLORIDE 30 MILLILITER(S): 9 INJECTION INTRAMUSCULAR; INTRAVENOUS; SUBCUTANEOUS at 08:53

## 2021-09-09 RX ADMIN — PANTOPRAZOLE SODIUM 40 MILLIGRAM(S): 20 TABLET, DELAYED RELEASE ORAL at 07:08

## 2021-09-09 RX ADMIN — DONEPEZIL HYDROCHLORIDE 5 MILLIGRAM(S): 10 TABLET, FILM COATED ORAL at 21:50

## 2021-09-09 RX ADMIN — ATORVASTATIN CALCIUM 40 MILLIGRAM(S): 80 TABLET, FILM COATED ORAL at 21:49

## 2021-09-09 RX ADMIN — PANTOPRAZOLE SODIUM 40 MILLIGRAM(S): 20 TABLET, DELAYED RELEASE ORAL at 17:44

## 2021-09-09 RX ADMIN — Medication 30 MILLIGRAM(S): at 11:23

## 2021-09-09 RX ADMIN — Medication 50 MILLIGRAM(S): at 06:34

## 2021-09-09 RX ADMIN — Medication 0.1 MILLIGRAM(S): at 06:34

## 2021-09-09 RX ADMIN — Medication 1: at 08:53

## 2021-09-09 RX ADMIN — Medication 3: at 13:24

## 2021-09-09 RX ADMIN — AMLODIPINE BESYLATE 5 MILLIGRAM(S): 2.5 TABLET ORAL at 06:34

## 2021-09-09 RX ADMIN — Medication 125 MICROGRAM(S): at 06:34

## 2021-09-09 RX ADMIN — Medication 325 MILLIGRAM(S): at 11:23

## 2021-09-09 RX ADMIN — PREGABALIN 1000 MICROGRAM(S): 225 CAPSULE ORAL at 22:03

## 2021-09-09 RX ADMIN — QUETIAPINE FUMARATE 100 MILLIGRAM(S): 200 TABLET, FILM COATED ORAL at 21:49

## 2021-09-09 NOTE — DIETITIAN INITIAL EVALUATION ADULT. - DIET TYPE
Defer diet/fluid consistencies to medical team/SLP recommendations - recommend Consistent Carbohydrate with snack + low salt diet + Glucerna 3xday as medically feasible. Will continue to monitor as able and adjust as needed.

## 2021-09-09 NOTE — DIETITIAN INITIAL EVALUATION ADULT. - REASON FOR ADMISSION
Pt 89 y/o F with PMH as per chart: LGIB 2/2 colitis/diverticular bleeding, CKD, CHF, Alzheimer's, COPD, DM, hypothyroidism, HTN, presented with melena admitted for work up and observation, found with acute blood loss anemia, no plan for endoscopic workup. As per NP, pt on clears due to low hemoglobin with plans to advance diet tomorrow if hemoglobin improves.

## 2021-09-09 NOTE — DIETITIAN INITIAL EVALUATION ADULT. - ORAL INTAKE PTA/DIET HISTORY
Daughter reports pt with good appetite and PO intake at home, denies changes in PO intake PTA. Confirms pt with NKFA. Denies pt taking vitamins/minerals PTA, states pt drinks Glucerna "once in a while". Reports pt not following any type of diet at home, consuming food with "crushed" texture due to dementia, unable to provide further details (spoke to NP). Reports BG monitoring 3xday with ranges 130-150mg/dl and states pt taking Metformin at home; HbA1c (09/08) 7.6% - indicates good BG control.

## 2021-09-09 NOTE — DIETITIAN INITIAL EVALUATION ADULT. - OTHER INFO
Daughter reports pt tolerating clear liquid diet. Denies pt with nausea, vomiting, diarrhea, or constipation, last BM 2 days ago (09/07). Daughter agreed to Glucerna once diet is resumed.     Daughter denies pt with weight changes PTA, reports UBW approximately 159 pounds -?accuracy. Weight as per previous RD note (02/19/2020) 160 pounds. Weight as per flow sheets (09/07) 160 pounds -> (09/09) 147.9 pounds. Obtained bedscale weight (09/09) 150.4 pounds.     Provided education on increased demands of kcal and protein intake to help with skin healing, recommended nutrient-dense snacks, non-perishable food, or nutritional supplement between meals and to encourage protein; reviewed foods with protein and menu order procedures in hospital. Daughter denies having further questions/concerns about diet and nutrition but request DM written material - made aware RD remains available.

## 2021-09-09 NOTE — DIETITIAN INITIAL EVALUATION ADULT. - REASON INDICATOR FOR ASSESSMENT
Nutrition consult received for GIB and pressure ulcer >2.   Information obtained from: medical record, previous RD note, daughter at bedside, and NP.  Pt confused/disoriented as per documentation, sleeping.

## 2021-09-09 NOTE — DIETITIAN INITIAL EVALUATION ADULT. - ADD RECOMMEND
1. Will continue to monitor PO intake when applicable, weight, labs, skin, GI status, diet. 2. Once applicable, gently encourage PO intake and obtain food preferences as able. 3. Consider Multivitamin and Vitamin C if medically feasible to optimize nutrient intake and further aid with pressure ulcer healing. 4. Provided education on increased demands of kcal and protein intake to help with skin healing - daughter made aware RD remains available.

## 2021-09-09 NOTE — DIETITIAN INITIAL EVALUATION ADULT. - PHYSCIAL ASSESSMENT
overweight Skin: pressure ulcers in bren. heel stage 1, coccyx stage 2, and perianal and left buttocks suspected deep tissue injury as per documentation.   No visual signs of muscle/fat loss noted in the upper area; unable to visually assess other covered areas.

## 2021-09-10 ENCOUNTER — TRANSCRIPTION ENCOUNTER (OUTPATIENT)
Age: 86
End: 2021-09-10

## 2021-09-10 VITALS
DIASTOLIC BLOOD PRESSURE: 63 MMHG | OXYGEN SATURATION: 94 % | RESPIRATION RATE: 17 BRPM | SYSTOLIC BLOOD PRESSURE: 162 MMHG | HEART RATE: 52 BPM

## 2021-09-10 LAB
BLD GP AB SCN SERPL QL: NEGATIVE — SIGNIFICANT CHANGE UP
GLUCOSE BLDC GLUCOMTR-MCNC: 112 MG/DL — HIGH (ref 70–99)
GLUCOSE BLDC GLUCOMTR-MCNC: 169 MG/DL — HIGH (ref 70–99)
GLUCOSE BLDC GLUCOMTR-MCNC: 340 MG/DL — HIGH (ref 70–99)
HCT VFR BLD CALC: 29.9 % — LOW (ref 34.5–45)
HGB BLD-MCNC: 9.5 G/DL — LOW (ref 11.5–15.5)
MCHC RBC-ENTMCNC: 26.2 PG — LOW (ref 27–34)
MCHC RBC-ENTMCNC: 31.8 GM/DL — LOW (ref 32–36)
MCV RBC AUTO: 82.4 FL — SIGNIFICANT CHANGE UP (ref 80–100)
NRBC # BLD: 0 /100 WBCS — SIGNIFICANT CHANGE UP (ref 0–0)
PLATELET # BLD AUTO: 298 K/UL — SIGNIFICANT CHANGE UP (ref 150–400)
RBC # BLD: 3.63 M/UL — LOW (ref 3.8–5.2)
RBC # FLD: 14 % — SIGNIFICANT CHANGE UP (ref 10.3–14.5)
RH IG SCN BLD-IMP: POSITIVE — SIGNIFICANT CHANGE UP
WBC # BLD: 9.19 K/UL — SIGNIFICANT CHANGE UP (ref 3.8–10.5)
WBC # FLD AUTO: 9.19 K/UL — SIGNIFICANT CHANGE UP (ref 3.8–10.5)

## 2021-09-10 PROCEDURE — 82435 ASSAY OF BLOOD CHLORIDE: CPT

## 2021-09-10 PROCEDURE — 86901 BLOOD TYPING SEROLOGIC RH(D): CPT

## 2021-09-10 PROCEDURE — 85025 COMPLETE CBC W/AUTO DIFF WBC: CPT

## 2021-09-10 PROCEDURE — 80053 COMPREHEN METABOLIC PANEL: CPT

## 2021-09-10 PROCEDURE — 85730 THROMBOPLASTIN TIME PARTIAL: CPT

## 2021-09-10 PROCEDURE — 84132 ASSAY OF SERUM POTASSIUM: CPT

## 2021-09-10 PROCEDURE — 96374 THER/PROPH/DIAG INJ IV PUSH: CPT

## 2021-09-10 PROCEDURE — 86900 BLOOD TYPING SEROLOGIC ABO: CPT

## 2021-09-10 PROCEDURE — 82947 ASSAY GLUCOSE BLOOD QUANT: CPT

## 2021-09-10 PROCEDURE — 83036 HEMOGLOBIN GLYCOSYLATED A1C: CPT

## 2021-09-10 PROCEDURE — 82607 VITAMIN B-12: CPT

## 2021-09-10 PROCEDURE — 74177 CT ABD & PELVIS W/CONTRAST: CPT | Mod: MA

## 2021-09-10 PROCEDURE — U0003: CPT

## 2021-09-10 PROCEDURE — 93005 ELECTROCARDIOGRAM TRACING: CPT

## 2021-09-10 PROCEDURE — 85018 HEMOGLOBIN: CPT

## 2021-09-10 PROCEDURE — 76937 US GUIDE VASCULAR ACCESS: CPT

## 2021-09-10 PROCEDURE — 97112 NEUROMUSCULAR REEDUCATION: CPT

## 2021-09-10 PROCEDURE — 85027 COMPLETE CBC AUTOMATED: CPT

## 2021-09-10 PROCEDURE — 86850 RBC ANTIBODY SCREEN: CPT

## 2021-09-10 PROCEDURE — 85014 HEMATOCRIT: CPT

## 2021-09-10 PROCEDURE — 82803 BLOOD GASES ANY COMBINATION: CPT

## 2021-09-10 PROCEDURE — 82272 OCCULT BLD FECES 1-3 TESTS: CPT

## 2021-09-10 PROCEDURE — 97530 THERAPEUTIC ACTIVITIES: CPT

## 2021-09-10 PROCEDURE — 86769 SARS-COV-2 COVID-19 ANTIBODY: CPT

## 2021-09-10 PROCEDURE — 84443 ASSAY THYROID STIM HORMONE: CPT

## 2021-09-10 PROCEDURE — 82962 GLUCOSE BLOOD TEST: CPT

## 2021-09-10 PROCEDURE — 85610 PROTHROMBIN TIME: CPT

## 2021-09-10 PROCEDURE — U0005: CPT

## 2021-09-10 PROCEDURE — 71045 X-RAY EXAM CHEST 1 VIEW: CPT

## 2021-09-10 PROCEDURE — 80048 BASIC METABOLIC PNL TOTAL CA: CPT

## 2021-09-10 PROCEDURE — 99285 EMERGENCY DEPT VISIT HI MDM: CPT | Mod: 25

## 2021-09-10 PROCEDURE — 84295 ASSAY OF SERUM SODIUM: CPT

## 2021-09-10 PROCEDURE — 97162 PT EVAL MOD COMPLEX 30 MIN: CPT

## 2021-09-10 PROCEDURE — 82330 ASSAY OF CALCIUM: CPT

## 2021-09-10 PROCEDURE — 83605 ASSAY OF LACTIC ACID: CPT

## 2021-09-10 RX ORDER — QUETIAPINE FUMARATE 200 MG/1
1 TABLET, FILM COATED ORAL
Qty: 0 | Refills: 0 | DISCHARGE

## 2021-09-10 RX ORDER — FUROSEMIDE 40 MG
1 TABLET ORAL
Qty: 0 | Refills: 0 | DISCHARGE

## 2021-09-10 RX ORDER — ALBUTEROL 90 UG/1
2 AEROSOL, METERED ORAL
Qty: 0 | Refills: 0 | DISCHARGE

## 2021-09-10 RX ORDER — AMLODIPINE BESYLATE 2.5 MG/1
1 TABLET ORAL
Qty: 0 | Refills: 0 | DISCHARGE

## 2021-09-10 RX ORDER — OMEPRAZOLE 10 MG/1
2 CAPSULE, DELAYED RELEASE ORAL
Qty: 0 | Refills: 0 | DISCHARGE

## 2021-09-10 RX ORDER — FLUTICASONE PROPIONATE AND SALMETEROL 50; 250 UG/1; UG/1
1 POWDER ORAL; RESPIRATORY (INHALATION)
Qty: 0 | Refills: 0 | DISCHARGE

## 2021-09-10 RX ORDER — METOPROLOL TARTRATE 50 MG
1 TABLET ORAL
Qty: 0 | Refills: 0 | DISCHARGE

## 2021-09-10 RX ORDER — FERROUS SULFATE 325(65) MG
1 TABLET ORAL
Qty: 0 | Refills: 0 | DISCHARGE

## 2021-09-10 RX ORDER — PREGABALIN 225 MG/1
1 CAPSULE ORAL
Qty: 0 | Refills: 0 | DISCHARGE
Start: 2021-09-10

## 2021-09-10 RX ORDER — SIMVASTATIN 20 MG/1
1 TABLET, FILM COATED ORAL
Qty: 0 | Refills: 0 | DISCHARGE

## 2021-09-10 RX ORDER — IPRATROPIUM/ALBUTEROL SULFATE 18-103MCG
3 AEROSOL WITH ADAPTER (GRAM) INHALATION
Qty: 0 | Refills: 0 | DISCHARGE

## 2021-09-10 RX ORDER — METFORMIN HYDROCHLORIDE 850 MG/1
1 TABLET ORAL
Qty: 0 | Refills: 0 | DISCHARGE

## 2021-09-10 RX ADMIN — Medication 325 MILLIGRAM(S): at 12:05

## 2021-09-10 RX ADMIN — AMLODIPINE BESYLATE 5 MILLIGRAM(S): 2.5 TABLET ORAL at 05:50

## 2021-09-10 RX ADMIN — PREGABALIN 1000 MICROGRAM(S): 225 CAPSULE ORAL at 16:52

## 2021-09-10 RX ADMIN — Medication 30 MILLIGRAM(S): at 12:05

## 2021-09-10 RX ADMIN — Medication 50 MILLIGRAM(S): at 05:50

## 2021-09-10 RX ADMIN — Medication 0.1 MILLIGRAM(S): at 05:50

## 2021-09-10 RX ADMIN — PANTOPRAZOLE SODIUM 40 MILLIGRAM(S): 20 TABLET, DELAYED RELEASE ORAL at 05:50

## 2021-09-10 RX ADMIN — Medication 1: at 09:03

## 2021-09-10 RX ADMIN — PREGABALIN 1000 MICROGRAM(S): 225 CAPSULE ORAL at 12:05

## 2021-09-10 RX ADMIN — Medication 4: at 12:50

## 2021-09-10 RX ADMIN — Medication 125 MICROGRAM(S): at 05:50

## 2021-09-10 RX ADMIN — PANTOPRAZOLE SODIUM 40 MILLIGRAM(S): 20 TABLET, DELAYED RELEASE ORAL at 16:56

## 2021-09-10 NOTE — DISCHARGE NOTE NURSING/CASE MANAGEMENT/SOCIAL WORK - PATIENT PORTAL LINK FT
You can access the FollowMyHealth Patient Portal offered by [facility] by registering at the following website: [email or web address]. By joining [employer]’s FollowMyHealth portal, you will also be able to view your health information using other applications (apps) compatible with our system.

## 2021-09-10 NOTE — CHART NOTE - NSCHARTNOTEFT_GEN_A_CORE
Based on patient's ongoing issues with deconditioning and generalized weakness secondary to patient's diagnosis of CHF/Alzheimers/ DIT. Patient will require a semi electric hospital bed with group 2 pressure redistribution mattress. This is necessary to achieve frequent changes in body position, elevation and head of bed needs to be elevated at least 30 degrees most of the time.  Pt is not able to make position changes without assistance.   Patient is at high risk for aspiration due to multiple medical problems.  Patient is bedbound and cannot make position changes. Pillows and wedges have been tried and failed.  Patient is at risk for further skin breakdown.      Shana Coley NP Based on patient's ongoing issues with deconditioning and generalized weakness secondary to patient's diagnosis of CHF/Alzheimers/DTI. Patient will require a semi electric hospital bed with group 2 pressure redistribution mattress. This is necessary to achieve frequent changes in body position, elevation and head of bed needs to be elevated at least 30 degrees most of the time.  Pt is not able to make position changes without assistance.   Patient is at high risk for aspiration due to multiple medical problems.  Patient is bedbound and cannot make position changes. Pillows and wedges have been tried and failed.  Patient is at risk for further skin breakdown.      Shana Coley NP

## 2021-09-10 NOTE — PROGRESS NOTE ADULT - PROBLEM SELECTOR PLAN 3
resume home meds on discharge   HbA1C 7.6  no intervention required at this time
continue finger sticks with short acting insulin sliding scale  no oral meds  HbA1C testing
resume home meds on discharge   HbA1C 7.6  no intervention required at this time

## 2021-09-10 NOTE — PROGRESS NOTE ADULT - PROBLEM SELECTOR PLAN 1
hemoglobin > 9 stable  GI help appreciated  will discharge patient home per family wishes
hemoglobin 8.5  discussed with renal attending who knows family and daughter well  they are not interested in ANY work up  GI help appreciated  will discharge patient home
hemoglobin 9 - 10  hemodynamically stable  GI evaluation appreciated   family unlikely to consent for invasive work up

## 2021-09-10 NOTE — PROGRESS NOTE ADULT - PROBLEM SELECTOR PROBLEM 6
[facility]        NAME: Rochelle Friedman is a 64 y o  female  : 1962    MRN: [id number]  DATE: 2019  TIME: 9:00 AM    Assessment and Plan   Herpes zoster without complication [T71 9]  1  Herpes zoster without complication  acyclovir (ZOVIRAX) 800 mg tablet         Patient Instructions       Follow up with PCP in 3-5 days  Proceed to  ER if symptoms worsen  Chief Complaint     Chief Complaint   Patient presents with    Rash     Pt  states she had a burning sensation on the left side of her butt cheek on Tues  Pt  noticed a small bubble yesterday that is now growing larger  Also have pain on that side and chills  History of Present Illness       Painful rash on the left buttocks beginning Tuesday  The rash is now beginning to bubble  Accompanied by chills  Review of Systems   Review of Systems   Skin: Positive for rash  Current Medications       Current Outpatient Medications:     acyclovir (ZOVIRAX) 800 mg tablet, Take 1 tablet (800 mg total) by mouth 5 (five) times a day for 10 days, Disp: 50 tablet, Rfl: 0    Current Allergies     Allergies as of 2019    (No Known Allergies)            The following portions of the patient's history were reviewed and updated as appropriate: allergies, current medications, past family history, past medical history, past social history, past surgical history and problem list      No past medical history on file  No past surgical history on file  No family history on file  Medications have been verified  Objective   /77   Pulse 99   Temp 100 °F (37 8 °C)   Wt (!) 144 kg (317 lb)   SpO2 93%   BMI 46 81 kg/m²        Physical Exam     Physical Exam   Skin:   An erythematous macular papular eruption over the left buttock but it does not cross the midline  The rash is beginning to blister 
Hypothyroidism

## 2021-09-10 NOTE — PROGRESS NOTE ADULT - PROVIDER SPECIALTY LIST ADULT
Gastroenterology
Gastroenterology
Nephrology
Gastroenterology
Nephrology
Internal Medicine

## 2021-09-10 NOTE — DISCHARGE NOTE PROVIDER - CARE PROVIDER_API CALL
Idris Allred)  Internal Medicine; Nephrology  [address], [address]  [place] [address]  Phone: [phone]  Fax: [phone]  Follow Up Time:    Idris Allred)  Internal Medicine; Nephrology  [address], [address]  [place] [address]  Phone: [phone]  Fax: [phone]  Follow Up Time:     Gonzalez Mullen ()  Gastroenterology; Internal Medicine  [address] [place] [address]  Phone: [phone]  Fax: [phone]  Follow Up Time: 1 week    Usama Salazar)  Critical Care Medicine; Internal Medicine; Pulmonary Disease; Sleep Medicine  [address], [address]  [place] [address]  Phone: [phone]  Fax: [phone]  Follow Up Time: 2 weeks

## 2021-09-10 NOTE — PROGRESS NOTE ADULT - PROBLEM SELECTOR PLAN 2
continue home meds with hold parameters  acceptable for now

## 2021-09-10 NOTE — DISCHARGE NOTE PROVIDER - NSDCMRMEDTOKEN_GEN_ALL_CORE_FT
albuterol-ipratropium 2.5 mg-0.5 mg/3 mL inhalation solution: 3 milliliter(s) inhaled every 6 hours, As Needed  amLODIPine 5 mg oral tablet: 1 tab(s) orally once a day  bariatric  commode: 1  buccal once a day   bariatric transport chair: 1  buccal once a day   cloNIDine 0.1 mg oral tablet: 1 tab(s) orally once a day  donepezil 5 mg oral tablet: 1 tab(s) orally once a day (at bedtime)  ferrous sulfate 325 mg (65 mg elemental iron) oral tablet: 1 tab(s) orally once a day  fluticasone-salmeterol 250 mcg-50 mcg inhalation powder: 1 puff(s) inhaled 2 times a day  furosemide 40 mg oral tablet: 1 tab(s) orally on MON, WED, FRIDAY  levothyroxine 125 mcg (0.125 mg) oral tablet: 1 tab(s) orally once a day  metFORMIN 500 mg oral tablet: 2 tabs orally in the morning  1 tab orally in the evening  metoprolol succinate 50 mg oral tablet, extended release: 1 tab(s) orally once a day  omeprazole 20 mg oral delayed release capsule: 2 cap(s) orally once a day  PARoxetine 40 mg oral tablet: 1 tab(s) orally once a day  QUEtiapine 100 mg oral tablet: 1 tab(s) orally once a day (at bedtime)  SEMIELECTRIC HOSPITAL BED: 1  buccal once a day   simvastatin 80 mg oral tablet: 1 tab(s) orally once a day (at bedtime)  Transport Wheelchair: [facility] 99  ICD Z74.0  Ventolin HFA 90 mcg/inh inhalation aerosol: 2 puff(s) inhaled every 6 hours, As Needed   albuterol-ipratropium 2.5 mg-0.5 mg/3 mL inhalation solution: 3 milliliter(s) inhaled every 6 hours, As Needed  amLODIPine 5 mg oral tablet: 1 tab(s) orally once a day  bariatric  commode: 1  buccal once a day   bariatric transport chair: 1  buccal once a day   cloNIDine 0.1 mg oral tablet: 1 tab(s) orally once a day  donepezil 5 mg oral tablet: 1 tab(s) orally once a day (at bedtime)  ferrous sulfate 325 mg (65 mg elemental iron) oral tablet: 1 tab(s) orally once a day  fluticasone-salmeterol 250 mcg-50 mcg inhalation powder: 1 puff(s) inhaled 2 times a day  furosemide 40 mg oral tablet: 1 tab(s) orally on MON, WED, FRIDAY  levothyroxine 125 mcg (0.125 mg) oral tablet: 1 tab(s) orally once a day  metFORMIN 500 mg oral tablet: 2 tabs orally in the morning  1 tab orally in the evening  metoprolol succinate 50 mg oral tablet, extended release: 1 tab(s) orally once a day  omeprazole 20 mg oral delayed release capsule: 2 cap(s) orally once a day  PARoxetine 40 mg oral tablet: 1 tab(s) orally once a day  QUEtiapine 100 mg oral tablet: 1 tab(s) orally once a day (at bedtime)  Semi Electric hospital bed with Group 2 pressure redistruction mattress:   SEMIELECTRIC HOSPITAL BED: 1  buccal once a day   simvastatin 80 mg oral tablet: 1 tab(s) orally once a day (at bedtime)  Transport Wheelchair: [facility] 99  ICD Z74.0  Ventolin HFA 90 mcg/inh inhalation aerosol: 2 puff(s) inhaled every 6 hours, As Needed   albuterol-ipratropium 2.5 mg-0.5 mg/3 mL inhalation solution: 3 milliliter(s) inhaled every 6 hours, As Needed  albuterol-ipratropium 2.5 mg-0.5 mg/3 mL inhalation solution: 3 milliliter(s) inhaled every 6 hours, As Needed  amLODIPine 5 mg oral tablet: 1 tab(s) orally once a day  bariatric  commode: 1  buccal once a day   bariatric transport chair: 1  buccal once a day   cloNIDine 0.1 mg oral tablet: 1 tab(s) orally once a day  cyanocobalamin 1000 mcg oral tablet: 1 tab(s) orally once a day  donepezil 5 mg oral tablet: 1 tab(s) orally once a day (at bedtime)  ferrous sulfate 325 mg (65 mg elemental iron) oral tablet: 1 tab(s) orally once a day  fluticasone-salmeterol 250 mcg-50 mcg inhalation powder: 1 puff(s) inhaled 2 times a day  fluticasone-salmeterol 250 mcg-50 mcg inhalation powder: 1 puff(s) inhaled 2 times a day  furosemide 40 mg oral tablet: 1 tab(s) orally on MON, WED, FRIDAY  levothyroxine 125 mcg (0.125 mg) oral tablet: 1 tab(s) orally once a day  metFORMIN 500 mg oral tablet: 2 tabs orally in the morning  1 tab orally in the evening  metoprolol succinate 50 mg oral tablet, extended release: 1 tab(s) orally once a day  omeprazole 20 mg oral delayed release capsule: 2 cap(s) orally once a day  PARoxetine 40 mg oral tablet: 1 tab(s) orally once a day  QUEtiapine 100 mg oral tablet: 1 tab(s) orally once a day (at bedtime)  Semi Electric hospital bed with Group 2 pressure redistruction mattress:   SEMIELECTRIC HOSPITAL BED: 1  buccal once a day   simvastatin 80 mg oral tablet: 1 tab(s) orally once a day (at bedtime)  Transport Wheelchair: [facility] 99  ICD Z74.0  Ventolin HFA 90 mcg/inh inhalation aerosol: 2 puff(s) inhaled every 6 hours, As Needed  Ventolin HFA 90 mcg/inh inhalation aerosol: 2 puff(s) inhaled every 6 hours, As Needed   albuterol-ipratropium 2.5 mg-0.5 mg/3 mL inhalation solution: 3 milliliter(s) inhaled every 6 hours, As Needed  albuterol-ipratropium 2.5 mg-0.5 mg/3 mL inhalation solution: 3 milliliter(s) inhaled every 6 hours, As Needed  amLODIPine 5 mg oral tablet: 1 tab(s) orally once a day  cloNIDine 0.1 mg oral tablet: 1 tab(s) orally once a day  cyanocobalamin 1000 mcg oral tablet: 1 tab(s) orally once a day  donepezil 5 mg oral tablet: 1 tab(s) orally once a day (at bedtime)  ferrous sulfate 325 mg (65 mg elemental iron) oral tablet: 1 tab(s) orally once a day  fluticasone-salmeterol 250 mcg-50 mcg inhalation powder: 1 puff(s) inhaled 2 times a day  fluticasone-salmeterol 250 mcg-50 mcg inhalation powder: 1 puff(s) inhaled 2 times a day  furosemide 40 mg oral tablet: 1 tab(s) orally on MON, WED, FRIDAY  levothyroxine 125 mcg (0.125 mg) oral tablet: 1 tab(s) orally once a day  metFORMIN 500 mg oral tablet: 2 tabs orally in the morning  1 tab orally in the evening  metoprolol succinate 50 mg oral tablet, extended release: 1 tab(s) orally once a day  omeprazole 20 mg oral delayed release capsule: 2 cap(s) orally once a day  PARoxetine 40 mg oral tablet: 1 tab(s) orally once a day  QUEtiapine 100 mg oral tablet: 1 tab(s) orally once a day (at bedtime)  Semi Electric hospital bed with Group 2 pressure redistruction mattress:   simvastatin 80 mg oral tablet: 1 tab(s) orally once a day (at bedtime)  Transport Wheelchair: [facility] 99  ICD Z74.0  Ventolin HFA 90 mcg/inh inhalation aerosol: 2 puff(s) inhaled every 6 hours, As Needed  Ventolin HFA 90 mcg/inh inhalation aerosol: 2 puff(s) inhaled every 6 hours, As Needed

## 2021-09-10 NOTE — DISCHARGE NOTE PROVIDER - HOSPITAL COURSE
87 yo female PMH of LGIB 2/2 colitis/diverticular bleeding, CKD, CHF and Alzheimer's who presents to the ED via EMS for melena admitted for work up and observation     Lower GI bleed.   ·  Plan: hemoglobin 8.5  discussed with renal attending who knows family and daughter well  they are not interested in ANY work up  GI help appreciated  will discharge patient home.    HTN (hypertension).   ·  Plan: continue home meds with hold parameters  acceptable for now.     Diabetes mellitus.   ·  Plan: resume home meds on discharge   HbA1C 7.6  no intervention required at this time.     Dementia.   ·  Plan: supportive care  fall precautions  B12 low normal  replete IM   start po B12 on discharge.    COPD, mild.   ·  Plan: continue home inhalers  no wheeze at this time.    Hypothyroidism.   ·  Plan: continue synthroid  TSH normal.   87 yo female PMH of LGIB 2/2 colitis/diverticular bleeding, CKD, CHF and Alzheimer's who presents to the ED via EMS for melena admitted for work up and observation     Problem/Plan - 1:  ·  Problem: Lower GI bleed.   ·  Plan: hemoglobin > 9 stable  GI help appreciated  will discharge patient home per family wishes.     Problem/Plan - 2:  ·  Problem: HTN (hypertension).   ·  Plan: continue home meds with hold parameters  acceptable for now.     Problem/Plan - 3:  ·  Problem: Diabetes mellitus.   ·  Plan: resume home meds on discharge   HbA1C 7.6  no intervention required at this time.     Problem/Plan - 4:  ·  Problem: Dementia.   ·  Plan: supportive care  fall precautions  B12 low normal  replete IM   continue po B12 on discharge.     Problem/Plan - 5:  ·  Problem: COPD, mild.   ·  Plan: continue home inhalers  no wheeze at this time.     Problem/Plan - 6:  ·  Problem: Hypothyroidism.   ·  Plan: continue synthroid  TSH normal. 87 yo female PMH of LGIB 2/2 colitis/diverticular bleeding, CKD, CHF and Alzheimer's who presents to the ED via EMS for melena admitted for work up and observation     Problem/Plan - 1:  ·  Problem: Lower GI bleed.   ·  Plan: hemoglobin > 9 stable  GI help appreciated  will discharge patient home per family wishes.     Problem/Plan - 2:  ·  Problem: HTN (hypertension).   ·  Plan: continue home meds with hold parameters  acceptable for now.     Problem/Plan - 3:  ·  Problem: Diabetes mellitus.   ·  Plan: resume home meds on discharge   HbA1C 7.6  no intervention required at this time.     Problem/Plan - 4:  ·  Problem: Dementia.   ·  Plan: supportive care  fall precautions  B12 low normal  replete IM   continue po B12 on discharge.     Problem/Plan - 5:  ·  Problem: COPD, mild.   ·  Plan: continue home inhalers  no wheeze at this time.     Problem/Plan - 6:  ·  Problem: Hypothyroidism.   ·  Plan: continue synthroid  TSH normal.    DCP and medication reconciliation discussed with Dr Melendrez and in agreement. Patient is hemodynamically stable and cleared for discharge. Patient will follow up with PMD and GI

## 2021-09-10 NOTE — PROGRESS NOTE ADULT - ASSESSMENT
rectal bleed   acute blood loss anemia    suspected resolving diverticular bleed  ct scan reviewed  hgb now appears stable  cont clears  no plan for endoscopic workup  no GI objection to dc planning   will follow      Advanced care planning was discussed with patient and family.  Advanced care planning forms were reviewed and discussed.  Risks, benefits and alternatives of gastroenterologic procedures were discussed in detail and all questions were answered.    30 minutes spent. 
rectal bleed   acute blood loss anemia    suspected resolving diverticular bleed  ct scan reviewed  hgb trending down   monitor cbc  cont clears  advance diet as tolerated   no plan for endoscopic workup  dc planning as per primary   will follow      Advanced care planning was discussed with patient and family.  Advanced care planning forms were reviewed and discussed.  Risks, benefits and alternatives of gastroenterologic procedures were discussed in detail and all questions were answered.    30 minutes spent. 
Ms. Alejandra is an 86 yo lady with PMH of  breast CA, HTN, COPD, T2DM, diastolic CHF, hypothyroidism, presented with BRBPR;    CKD stage 3.     1. CKD stage 3:   2. Acute blood loss anemia but stable   3. HTN        RECOMMEND:      - Conservative management with respect to GI tract    - Renal dose all medication for GFR < 45 ml/min.   - Give blood transfusion for Hemoglobin < 7.0 but there is no need at present   -  BP is stable  -DC IVF        [facility] GI MD   If there is no further bleeding then dc planning?  Call placed to daughter       Sayed Ali   [facility]   [phone]          
Ms. Alejandra is an 88 yo lady with PMH of  breast CA, HTN, COPD, T2DM, diastolic CHF, hypothyroidism, presented with BRBPR;    CKD stage 3.     1. CKD stage 3:   2. Acute blood loss anemia. Hemoglobin is > 10 and stable   3. HTN        RECOMMEND:      - Conservative management with respect to GI tract    - Renal dose all medication for GFR < 45 ml/min.   - Give blood transfusion for Hemoglobin < 7.0 but there is no need at present   - BMP, CBC, Magnesium and phosphorus daily.  -BP is stable  -Reduce IVF to 30cc/hr and plan to dc in am       [facility]ASCALE GI MD   If there is no further bleeding then dc planning for am       Sayed Liquid   [phone]          
rectal bleed   acute blood loss anemia    suspected resolving diverticular bleed  ct scan reviewed  hgb trending down   monitor cbc  cont clears  advance diet as tolerated   no plan for endoscopic workup  will follow      Advanced care planning was discussed with patient and family.  Advanced care planning forms were reviewed and discussed.  Risks, benefits and alternatives of gastroenterologic procedures were discussed in detail and all questions were answered.    30 minutes spent. 
89 yo female PMH of LGIB 2/2 colitis/diverticular bleeding, CKD, CHF and Alzheimer's who presents to the ED via EMS for melena admitted for work up and observation
87 yo female PMH of LGIB 2/2 colitis/diverticular bleeding, CKD, CHF and Alzheimer's who presents to the ED via EMS for melena admitted for work up and observation
87 yo female PMH of LGIB 2/2 colitis/diverticular bleeding, CKD, CHF and Alzheimer's who presents to the ED via EMS for melena admitted for work up and observation

## 2021-09-10 NOTE — DISCHARGE NOTE PROVIDER - NSDCHHNEEDSERVICE_GEN_ALL_CORE
Medication teaching and assessment/Teaching and training Medication teaching and assessment/Rehabilitation services/Teaching and training/Wound care and assessment

## 2021-09-10 NOTE — DISCHARGE NOTE PROVIDER - CARE PROVIDERS DIRECT ADDRESSES
,[email or web address]

## 2021-09-10 NOTE — DISCHARGE NOTE PROVIDER - NSDCCPCAREPLAN_GEN_ALL_CORE_FT
PRINCIPAL DISCHARGE DIAGNOSIS  Diagnosis: Lower GI bleed  Assessment and Plan of Treatment:       SECONDARY DISCHARGE DIAGNOSES  Diagnosis: Hypothyroidism  Assessment and Plan of Treatment:     Diagnosis: Diabetes mellitus  Assessment and Plan of Treatment:     Diagnosis: HTN (hypertension)  Assessment and Plan of Treatment:     Diagnosis: COPD, mild  Assessment and Plan of Treatment:     Diagnosis: Dementia  Assessment and Plan of Treatment:      PRINCIPAL DISCHARGE DIAGNOSIS  Diagnosis: Lower GI bleed  Assessment and Plan of Treatment: Resolved  Please follow up with your gastroenterologist 1-2 weeks after discharge for continued monitoring and management.      SECONDARY DISCHARGE DIAGNOSES  Diagnosis: HTN (hypertension)  Assessment and Plan of Treatment: Low salt diet  Activity as tolerated.  Take all medication as prescribed.  Follow up with your medical doctor for routine blood pressure monitoring at your next visit.  Notify your doctor if you have any of the following symptoms:   Dizziness, Lightheadedness, Blurry vision, Headache, Chest pain, Shortness of breath      Diagnosis: Diabetes mellitus  Assessment and Plan of Treatment: HgA1C this admission, 7.6%.  Make sure you get your HgA1c checked every three months.  Take you medications as prescribed by your doctor.  Keep a list of your current medications including injectables and over the counter medications and bring this medication list with you to all your doctor appointments.  If you have not seen your opthalmologist this year call for appointment.  Check your feet daily for redness, sores, or openings. Do not self treat. If no improvement in two days call your primary care physician for an appointment.  Low blood sugar (hypoglycemia) is a blood sugar below 70mg/dl. Check your blood sugar if you feel signs/symptoms of hypoglycemia. If your blood sugar is below 70 take 15 grams of carbohydrates (ex 4 oz of apple juice, 3-4 glucosr tablets, or 4-6 oz of regular soda) wait 15 minutes and repeat blood sugar to make sure it comes up above 70.  If your blood sugar is above 70 and you are due for a meal, have a meal.  If you are not due for a meal have a snack.  This snack helps keeps your blood sugar at a safe range.      Diagnosis: Dementia  Assessment and Plan of Treatment: Continue to take your medication as prescbribed by your doctor  Please follow up with your primary care physician after discharge for continued monitoring and management.      Diagnosis: COPD, mild  Assessment and Plan of Treatment: Call your Health Care provider upon arrival home to make a follow up appointment within one week.  Take all inhalers as prescribed by your Health Care Provider.  Take steroids as prescribed by your Health Care Provider.  If your cough increases infrequency and severity and/or you have shortness of breath or increased shortness of breath call your Health Care Provider.  If you develop fever, chills, night sweats, malaise, and/or change in mental status call your Health care Provider.  Nutrition is very important.  Eat small frequent meals.  Increase your activity as tolerated.  Do not stay in bed all day      Diagnosis: Hypothyroidism  Assessment and Plan of Treatment: Continue to take your medictaion as prescribed by your doctor    Diagnosis: Incontinence  Assessment and Plan of Treatment: To care of the skin when managing incontinence-  use disposable underpads versus briefs. Apply barriers cream to protect the skin.

## 2021-09-10 NOTE — PROGRESS NOTE ADULT - PROBLEM SELECTOR PLAN 5
continue home inhalers  no wheeze at this time

## 2021-09-10 NOTE — PROGRESS NOTE ADULT - NSPROGADDITIONALINFOA_GEN_ALL_CORE
discussed with covering ACP discussed with covering ACP  discussed with   med recon done    encounter  35 min

## 2021-09-10 NOTE — DISCHARGE NOTE PROVIDER - PROVIDER TOKENS
PROVIDER:[TOKEN:[[id number]]] PROVIDER:[TOKEN:[[id number]]],PROVIDER:[TOKEN:[[id number]],FOLLOWUP:[1 week]],PROVIDER:[TOKEN:[[id number]],FOLLOWUP:[2 weeks]]

## 2021-09-10 NOTE — CHART NOTE - NSCHARTNOTEFT_GEN_A_CORE
Due to patients deconditioning and generalized weakness secondary to CHF/ Alzheimer, patient will require a transport wheelchair. Patient is unable to self-propel in standard wheelchair. This is necessary to achieve daily task and therapies, which cannot be achieved with the use of a cane or rolling walker. Patient & family are in agreement with wheelchair use at home and assistance will be provided if needed. Family will assist patient in propelling transport wheelchair.     Shana Coley NP

## 2021-09-10 NOTE — PROGRESS NOTE ADULT - SUBJECTIVE AND OBJECTIVE BOX
[facility]  Tacos Dowell PA-C  [facility] [place] [address]  [phone]      INTERVAL HPI/OVERNIGHT EVENTS:  pt seen and evaluated  as per nurse, no further blood in the stool   hgb trending down     MEDICATIONS  (STANDING):  amLODIPine   Tablet 5 milliGRAM(s) Oral daily  atorvastatin 40 milliGRAM(s) Oral at bedtime  cloNIDine 0.1 milliGRAM(s) Oral daily  dextrose 40% Gel 15 Gram(s) Oral once  dextrose 5%. 1000 milliLiter(s) (50 mL/Hr) IV Continuous <Continuous>  dextrose 5%. 1000 milliLiter(s) (100 mL/Hr) IV Continuous <Continuous>  dextrose 50% Injectable 25 Gram(s) IV Push once  dextrose 50% Injectable 12.5 Gram(s) IV Push once  dextrose 50% Injectable 25 Gram(s) IV Push once  donepezil 5 milliGRAM(s) Oral at bedtime  ferrous    sulfate 325 milliGRAM(s) Oral daily  glucagon  Injectable 1 milliGRAM(s) IntraMuscular once  insulin lispro (ADMELOG) corrective regimen sliding scale   SubCutaneous three times a day before meals  levothyroxine 125 MICROGram(s) Oral daily  metoprolol succinate ER 50 milliGRAM(s) Oral daily  pantoprazole  Injectable 40 milliGRAM(s) IV Push every 12 hours  PARoxetine 30 milliGRAM(s) Oral daily  QUEtiapine 100 milliGRAM(s) Oral at bedtime  sodium chloride 0.9%. 1000 milliLiter(s) (30 mL/Hr) IV Continuous <Continuous>    MEDICATIONS  (PRN):  ALBUTerol    90 MICROgram(s) HFA Inhaler 2 Puff(s) Inhalation every 6 hours PRN Shortness of Breath and/or Wheezing      Allergies    Vasotec (Unknown)    Intolerances        ROS:   General:  No wt loss, fevers, chills, night sweats, fatigue,   Eyes:  Good vision, no reported pain  ENT:  No sore throat, pain, runny nose, dysphagia  CV:  No pain, palpitations, hypo/hypertension  Resp:  No dyspnea, cough, tachypnea, wheezing  GI:  No pain, No nausea, No vomiting, No diarrhea, No constipation, No weight loss, No fever, No pruritis, No rectal bleeding, No tarry stools, No dysphagia,  :  No pain, bleeding, incontinence, nocturia  Muscle:  No pain, weakness  Neuro:  No weakness, tingling, memory problems  Psych:  No fatigue, insomnia, mood problems, depression  Endocrine:  No polyuria, polydipsia, cold/heat intolerance  Heme:  No petechiae, ecchymosis, easy bruisability  Skin:  No rash, tattoos, scars, edema      PHYSICAL EXAM:   Vital Signs:  Vital Signs Last 24 Hrs  T(C): 36.4 (08 Sep 2021 05:03), Max: 36.9 (07 Sep 2021 12:00)  T(F): 97.5 (08 Sep 2021 05:03), Max: 98.5 (07 Sep 2021 12:00)  HR: 81 (08 Sep 2021 05:03) (63 - 81)  BP: 128/76 (08 Sep 2021 05:03) (128/76 - 189/82)  BP(mean): --  RR: 18 (08 Sep 2021 05:03) (16 - 18)  SpO2: 95% (08 Sep 2021 05:03) (95% - 99%)  Daily Height in cm: 152.4 (07 Sep 2021 19:50)    Daily Weight in k.4 (08 Sep 2021 05:03)    GENERAL:  Appears stated age,   HEENT:  NC/AT,    CHEST:  Full & symmetric excursion,   HEART:  Regular rhythm,  ABDOMEN:  Soft, non-tender, non-distended,  EXTEREMITIES:  no cyanosis  SKIN:  No rash  NEURO:  Alert,       LABS:                        9.5    9.52  )-----------( 197      ( 08 Sep 2021 10:15 )             30.8     09-08    137  |  100  |  32<H>  ----------------------------<  141<H>  5.1   |  21<L>  |  1.24    Ca    9.4      08 Sep 2021 10:15    TPro  8.1  /  Alb  4.3  /  TBili  0.2  /  DBili  x   /  AST  17  /  ALT  10  /  AlkPhos  88  09-07    PT/INR - ( 07 Sep 2021 08:08 )   PT: 11.9 sec;   INR: 0.99 ratio         PTT - ( 07 Sep 2021 08:08 )  PTT:23.6 sec      RADIOLOGY & ADDITIONAL TESTS:  
[facility] [phone]        Patient seen and examined in bed.  She was the same  No bloody BM noted         MEDICATIONS  (STANDING):  amLODIPine   Tablet 5 milliGRAM(s) Oral daily  atorvastatin 40 milliGRAM(s) Oral at bedtime  cloNIDine 0.1 milliGRAM(s) Oral daily  dextrose 40% Gel 15 Gram(s) Oral once  dextrose 5%. 1000 milliLiter(s) (50 mL/Hr) IV Continuous <Continuous>  dextrose 5%. 1000 milliLiter(s) (100 mL/Hr) IV Continuous <Continuous>  dextrose 50% Injectable 25 Gram(s) IV Push once  dextrose 50% Injectable 12.5 Gram(s) IV Push once  dextrose 50% Injectable 25 Gram(s) IV Push once  donepezil 5 milliGRAM(s) Oral at bedtime  ferrous    sulfate 325 milliGRAM(s) Oral daily  glucagon  Injectable 1 milliGRAM(s) IntraMuscular once  insulin lispro (ADMELOG) corrective regimen sliding scale   SubCutaneous three times a day before meals  levothyroxine 125 MICROGram(s) Oral daily  metoprolol succinate ER 50 milliGRAM(s) Oral daily  pantoprazole    Tablet 40 milliGRAM(s) Oral two times a day  PARoxetine 30 milliGRAM(s) Oral daily  QUEtiapine 100 milliGRAM(s) Oral at bedtime  sodium chloride 0.9%. 1000 milliLiter(s) (30 mL/Hr) IV Continuous <Continuous>      VITAL:  T(C): , Max: 36.6 (09-08-21 @ 21:52)  T(F): , Max: 97.9 (09-08-21 @ 21:52)  HR: 61 (09-09-21 @ 05:10)  BP: 152/75 (09-09-21 @ 05:10)  BP(mean): --  RR: 18 (09-09-21 @ 05:10)  SpO2: 94% (09-09-21 @ 05:10)  Wt(kg): --    I and O's:    09-08 @ 07:01  -  09-09 @ 07:00  --------------------------------------------------------  IN: 1140 mL / OUT: 1100 mL / NET: 40 mL          PHYSICAL EXAM:    Constitutional: NAD  Neck:  No JVD  Respiratory: poor effort   Cardiovascular: S1 and S2  Gastrointestinal: BS+, soft, NT/ND  Extremities: No peripheral edema  Neurological:   : No Stephen  Skin: No rashes  Access: Not applicable    LABS:                        9.5    9.52  )-----------( 197      ( 08 Sep 2021 10:15 )             30.8     09-08    137  |  100  |  32<H>  ----------------------------<  141<H>  5.1   |  21<L>  |  1.24    Ca    9.4      08 Sep 2021 10:15    TPro  8.1  /  Alb  4.3  /  TBili  0.2  /  DBili  x   /  AST  17  /  ALT  10  /  AlkPhos  88  09-07          Urine Studies:          RADIOLOGY & ADDITIONAL STUDIES:            
[facility]  Tacos Dowell PA-C  [facility] [place] [address]  [phone]      INTERVAL HPI/OVERNIGHT EVENTS:  pt seen and evaluated  as per nurse, no further blood in the stool     MEDICATIONS  (STANDING):  amLODIPine   Tablet 5 milliGRAM(s) Oral daily  atorvastatin 40 milliGRAM(s) Oral at bedtime  cloNIDine 0.1 milliGRAM(s) Oral daily  dextrose 40% Gel 15 Gram(s) Oral once  dextrose 5%. 1000 milliLiter(s) (50 mL/Hr) IV Continuous <Continuous>  dextrose 5%. 1000 milliLiter(s) (100 mL/Hr) IV Continuous <Continuous>  dextrose 50% Injectable 25 Gram(s) IV Push once  dextrose 50% Injectable 12.5 Gram(s) IV Push once  dextrose 50% Injectable 25 Gram(s) IV Push once  donepezil 5 milliGRAM(s) Oral at bedtime  ferrous    sulfate 325 milliGRAM(s) Oral daily  glucagon  Injectable 1 milliGRAM(s) IntraMuscular once  insulin lispro (ADMELOG) corrective regimen sliding scale   SubCutaneous three times a day before meals  levothyroxine 125 MICROGram(s) Oral daily  metoprolol succinate ER 50 milliGRAM(s) Oral daily  pantoprazole  Injectable 40 milliGRAM(s) IV Push every 12 hours  PARoxetine 30 milliGRAM(s) Oral daily  QUEtiapine 100 milliGRAM(s) Oral at bedtime  sodium chloride 0.9%. 1000 milliLiter(s) (30 mL/Hr) IV Continuous <Continuous>    MEDICATIONS  (PRN):  ALBUTerol    90 MICROgram(s) HFA Inhaler 2 Puff(s) Inhalation every 6 hours PRN Shortness of Breath and/or Wheezing      Allergies    Vasotec (Unknown)    Intolerances        ROS:   General:  No wt loss, fevers, chills, night sweats, fatigue,   Eyes:  Good vision, no reported pain  ENT:  No sore throat, pain, runny nose, dysphagia  CV:  No pain, palpitations, hypo/hypertension  Resp:  No dyspnea, cough, tachypnea, wheezing  GI:  No pain, No nausea, No vomiting, No diarrhea, No constipation, No weight loss, No fever, No pruritis, No rectal bleeding, No tarry stools, No dysphagia,  :  No pain, bleeding, incontinence, nocturia  Muscle:  No pain, weakness  Neuro:  No weakness, tingling, memory problems  Psych:  No fatigue, insomnia, mood problems, depression  Endocrine:  No polyuria, polydipsia, cold/heat intolerance  Heme:  No petechiae, ecchymosis, easy bruisability  Skin:  No rash, tattoos, scars, edema      PHYSICAL EXAM:   Vital Signs:  Vital Signs Last 24 Hrs  T(C): 36.4 (08 Sep 2021 05:03), Max: 36.9 (07 Sep 2021 12:00)  T(F): 97.5 (08 Sep 2021 05:03), Max: 98.5 (07 Sep 2021 12:00)  HR: 81 (08 Sep 2021 05:03) (63 - 81)  BP: 128/76 (08 Sep 2021 05:03) (128/76 - 189/82)  BP(mean): --  RR: 18 (08 Sep 2021 05:03) (16 - 18)  SpO2: 95% (08 Sep 2021 05:03) (95% - 99%)  Daily Height in cm: 152.4 (07 Sep 2021 19:50)    Daily Weight in k.4 (08 Sep 2021 05:03)    GENERAL:  Appears stated age,   HEENT:  NC/AT,    CHEST:  Full & symmetric excursion,   HEART:  Regular rhythm,  ABDOMEN:  Soft, non-tender, non-distended,  EXTEREMITIES:  no cyanosis  SKIN:  No rash  NEURO:  Alert,       LABS:                        9.5    9.52  )-----------( 197      ( 08 Sep 2021 10:15 )             30.8     09-08    137  |  100  |  32<H>  ----------------------------<  141<H>  5.1   |  21<L>  |  1.24    Ca    9.4      08 Sep 2021 10:15    TPro  8.1  /  Alb  4.3  /  TBili  0.2  /  DBili  x   /  AST  17  /  ALT  10  /  AlkPhos  88  09-07    PT/INR - ( 07 Sep 2021 08:08 )   PT: 11.9 sec;   INR: 0.99 ratio         PTT - ( 07 Sep 2021 08:08 )  PTT:23.6 sec      RADIOLOGY & ADDITIONAL TESTS:  
[facility]  Tacos Dowell PA-C  [facility] [place] [address]  [phone]      INTERVAL HPI/OVERNIGHT EVENTS:  pt seen and evaluated  no further blood in the stool   hgb improved    MEDICATIONS  (STANDING):  amLODIPine   Tablet 5 milliGRAM(s) Oral daily  atorvastatin 40 milliGRAM(s) Oral at bedtime  cloNIDine 0.1 milliGRAM(s) Oral daily  dextrose 40% Gel 15 Gram(s) Oral once  dextrose 5%. 1000 milliLiter(s) (50 mL/Hr) IV Continuous <Continuous>  dextrose 5%. 1000 milliLiter(s) (100 mL/Hr) IV Continuous <Continuous>  dextrose 50% Injectable 25 Gram(s) IV Push once  dextrose 50% Injectable 12.5 Gram(s) IV Push once  dextrose 50% Injectable 25 Gram(s) IV Push once  donepezil 5 milliGRAM(s) Oral at bedtime  ferrous    sulfate 325 milliGRAM(s) Oral daily  glucagon  Injectable 1 milliGRAM(s) IntraMuscular once  insulin lispro (ADMELOG) corrective regimen sliding scale   SubCutaneous three times a day before meals  levothyroxine 125 MICROGram(s) Oral daily  metoprolol succinate ER 50 milliGRAM(s) Oral daily  pantoprazole  Injectable 40 milliGRAM(s) IV Push every 12 hours  PARoxetine 30 milliGRAM(s) Oral daily  QUEtiapine 100 milliGRAM(s) Oral at bedtime  sodium chloride 0.9%. 1000 milliLiter(s) (30 mL/Hr) IV Continuous <Continuous>    MEDICATIONS  (PRN):  ALBUTerol    90 MICROgram(s) HFA Inhaler 2 Puff(s) Inhalation every 6 hours PRN Shortness of Breath and/or Wheezing      Allergies    Vasotec (Unknown)    Intolerances        ROS:   General:  No wt loss, fevers, chills, night sweats, fatigue,   Eyes:  Good vision, no reported pain  ENT:  No sore throat, pain, runny nose, dysphagia  CV:  No pain, palpitations, hypo/hypertension  Resp:  No dyspnea, cough, tachypnea, wheezing  GI:  No pain, No nausea, No vomiting, No diarrhea, No constipation, No weight loss, No fever, No pruritis, No rectal bleeding, No tarry stools, No dysphagia,  :  No pain, bleeding, incontinence, nocturia  Muscle:  No pain, weakness  Neuro:  No weakness, tingling, memory problems  Psych:  No fatigue, insomnia, mood problems, depression  Endocrine:  No polyuria, polydipsia, cold/heat intolerance  Heme:  No petechiae, ecchymosis, easy bruisability  Skin:  No rash, tattoos, scars, edema      PHYSICAL EXAM:   Vital Signs:  Vital Signs Last 24 Hrs  T(C): 36.4 (08 Sep 2021 05:03), Max: 36.9 (07 Sep 2021 12:00)  T(F): 97.5 (08 Sep 2021 05:03), Max: 98.5 (07 Sep 2021 12:00)  HR: 81 (08 Sep 2021 05:03) (63 - 81)  BP: 128/76 (08 Sep 2021 05:03) (128/76 - 189/82)  BP(mean): --  RR: 18 (08 Sep 2021 05:03) (16 - 18)  SpO2: 95% (08 Sep 2021 05:03) (95% - 99%)  Daily Height in cm: 152.4 (07 Sep 2021 19:50)    Daily Weight in k.4 (08 Sep 2021 05:03)    GENERAL:  Appears stated age,   HEENT:  NC/AT,    CHEST:  Full & symmetric excursion,   HEART:  Regular rhythm,  ABDOMEN:  Soft, non-tender, non-distended,  EXTEREMITIES:  no cyanosis  SKIN:  No rash  NEURO:  Alert,       LABS:                        9.5    9.52  )-----------( 197      ( 08 Sep 2021 10:15 )             30.8     09-08    137  |  100  |  32<H>  ----------------------------<  141<H>  5.1   |  21<L>  |  1.24    Ca    9.4      08 Sep 2021 10:15    TPro  8.1  /  Alb  4.3  /  TBili  0.2  /  DBili  x   /  AST  17  /  ALT  10  /  AlkPhos  88  09-07    PT/INR - ( 07 Sep 2021 08:08 )   PT: 11.9 sec;   INR: 0.99 ratio         PTT - ( 07 Sep 2021 08:08 )  PTT:23.6 sec      RADIOLOGY & ADDITIONAL TESTS:  
NEPHROLOGY[facility] [phone]        Patient seen and examined in bed.  She was the same         MEDICATIONS  (STANDING):  amLODIPine   Tablet 5 milliGRAM(s) Oral daily  atorvastatin 40 milliGRAM(s) Oral at bedtime  cloNIDine 0.1 milliGRAM(s) Oral daily  dextrose 40% Gel 15 Gram(s) Oral once  dextrose 5%. 1000 milliLiter(s) (50 mL/Hr) IV Continuous <Continuous>  dextrose 5%. 1000 milliLiter(s) (100 mL/Hr) IV Continuous <Continuous>  dextrose 50% Injectable 25 Gram(s) IV Push once  dextrose 50% Injectable 12.5 Gram(s) IV Push once  dextrose 50% Injectable 25 Gram(s) IV Push once  donepezil 5 milliGRAM(s) Oral at bedtime  ferrous    sulfate 325 milliGRAM(s) Oral daily  glucagon  Injectable 1 milliGRAM(s) IntraMuscular once  insulin lispro (ADMELOG) corrective regimen sliding scale   SubCutaneous three times a day before meals  levothyroxine 125 MICROGram(s) Oral daily  metoprolol succinate ER 50 milliGRAM(s) Oral daily  pantoprazole  Injectable 40 milliGRAM(s) IV Push every 12 hours  PARoxetine 30 milliGRAM(s) Oral daily  QUEtiapine 100 milliGRAM(s) Oral at bedtime  sodium chloride 0.9%. 1000 milliLiter(s) (60 mL/Hr) IV Continuous <Continuous>      VITAL:  T(C): , Max: 36.9 (09-07-21 @ 12:00)  T(F): , Max: 98.5 (09-07-21 @ 12:00)  HR: 81 (09-08-21 @ 05:03)  BP: 128/76 (09-08-21 @ 05:03)  BP(mean): --  RR: 18 (09-08-21 @ 05:03)  SpO2: 95% (09-08-21 @ 05:03)  Wt(kg): --    I and O's:    09-07 @ 07:01  -  09-08 @ 07:00  --------------------------------------------------------  IN: 1014 mL / OUT: 0 mL / NET: 1014 mL      Height (cm): 152.4 (09-07 @ 19:50)  Weight (kg): 72.8 (09-07 @ 19:50)  BMI (kg/m2): 31.3 (09-07 @ 19:50)  BSA (m2): 1.7 (09-07 @ 19:50)    PHYSICAL EXAM:    Constitutional: NAD  Neck:  No JVD  Respiratory: poor effort   Cardiovascular: S1 and S2  Gastrointestinal: BS+, soft, NT/ND  Extremities: No peripheral edema  Neurological:   : No Stephen  Skin: No rashes  Access: Not applicable    LABS:                        10.6   9.33  )-----------( 218      ( 07 Sep 2021 18:47 )             33.6     09-07    143  |  98  |  28<H>  ----------------------------<  180<H>  4.0   |  22  |  1.10    Ca    10.7<H>      07 Sep 2021 12:21    TPro  8.1  /  Alb  4.3  /  TBili  0.2  /  DBili  x   /  AST  17  /  ALT  10  /  AlkPhos  88  09-07          Urine Studies:          RADIOLOGY & ADDITIONAL STUDIES:            
Patient is a 88y old  Female who presents with a chief complaint of melena (08 Sep 2021 11:15)      DATE OF SERVICE: 09-08-21 @ 13:45    SUBJECTIVE / OVERNIGHT EVENTS: overnight events noted    ROS:  Resp: No cough no sputum production  CVS: No chest pain no palpitations no orthopnea  GI: no N/V/D  per RN        MEDICATIONS  (STANDING):  amLODIPine   Tablet 5 milliGRAM(s) Oral daily  atorvastatin 40 milliGRAM(s) Oral at bedtime  cloNIDine 0.1 milliGRAM(s) Oral daily  dextrose 40% Gel 15 Gram(s) Oral once  dextrose 5%. 1000 milliLiter(s) (50 mL/Hr) IV Continuous <Continuous>  dextrose 5%. 1000 milliLiter(s) (100 mL/Hr) IV Continuous <Continuous>  dextrose 50% Injectable 25 Gram(s) IV Push once  dextrose 50% Injectable 12.5 Gram(s) IV Push once  dextrose 50% Injectable 25 Gram(s) IV Push once  donepezil 5 milliGRAM(s) Oral at bedtime  ferrous    sulfate 325 milliGRAM(s) Oral daily  glucagon  Injectable 1 milliGRAM(s) IntraMuscular once  insulin lispro (ADMELOG) corrective regimen sliding scale   SubCutaneous three times a day before meals  levothyroxine 125 MICROGram(s) Oral daily  metoprolol succinate ER 50 milliGRAM(s) Oral daily  pantoprazole    Tablet 40 milliGRAM(s) Oral two times a day  PARoxetine 30 milliGRAM(s) Oral daily  QUEtiapine 100 milliGRAM(s) Oral at bedtime  sodium chloride 0.9%. 1000 milliLiter(s) (30 mL/Hr) IV Continuous <Continuous>    MEDICATIONS  (PRN):  ALBUTerol    90 MICROgram(s) HFA Inhaler 2 Puff(s) Inhalation every 6 hours PRN Shortness of Breath and/or Wheezing        CAPILLARY BLOOD GLUCOSE      POCT Blood Glucose.: 149 mg/dL (08 Sep 2021 12:18)  POCT Blood Glucose.: 184 mg/dL (08 Sep 2021 08:16)  POCT Blood Glucose.: 147 mg/dL (07 Sep 2021 22:26)  POCT Blood Glucose.: 160 mg/dL (07 Sep 2021 17:40)    I&O's Summary    07 Sep 2021 07:01  -  08 Sep 2021 07:00  --------------------------------------------------------  IN: 1014 mL / OUT: 0 mL / NET: 1014 mL        Vital Signs Last 24 Hrs  T(C): 36.3 (08 Sep 2021 12:02), Max: 36.7 (07 Sep 2021 18:44)  T(F): 97.4 (08 Sep 2021 12:02), Max: 98 (07 Sep 2021 18:44)  HR: 61 (08 Sep 2021 12:02) (61 - 81)  BP: 128/76 (08 Sep 2021 05:03) (128/76 - 152/74)  BP(mean): --  RR: 18 (08 Sep 2021 12:02) (16 - 18)  SpO2: 95% (08 Sep 2021 12:02) (95% - 99%)    PHYSICAL EXAM:   HEENT: CAROLINE EOMI  Neck: Supple  Lungs: clear  CVS: S1 S2 soft systolic murmur +   Abdomen: no tenderness  Neuro: Alert non focal   Skin: warm, dry  Ext: no edema  Msk: no joint swelling    LABS:                        9.5    9.52  )-----------( 197      ( 08 Sep 2021 10:15 )             30.8     09-08    137  |  100  |  32<H>  ----------------------------<  141<H>  5.1   |  21<L>  |  1.24    Ca    9.4      08 Sep 2021 10:15    TPro  8.1  /  Alb  4.3  /  TBili  0.2  /  DBili  x   /  AST  17  /  ALT  10  /  AlkPhos  88  09-07    PT/INR - ( 07 Sep 2021 08:08 )   PT: 11.9 sec;   INR: 0.99 ratio         PTT - ( 07 Sep 2021 08:08 )  PTT:23.6 sec            All consultant(s) notes reviewed and care discussed with other providers        Contact Number, Dr Melendrez [phone]
Patient is a 88y old  Female who presents with a chief complaint of melena (09 Sep 2021 11:13)      DATE OF SERVICE: 09-09-21 @ 13:28    SUBJECTIVE / OVERNIGHT EVENTS: overnight events noted    ROS:  Resp: No cough no sputum production  CVS: No chest pain no palpitations no orthopnea  GI: no N/V/D no BRBPR   per RN        MEDICATIONS  (STANDING):  amLODIPine   Tablet 5 milliGRAM(s) Oral daily  atorvastatin 40 milliGRAM(s) Oral at bedtime  cloNIDine 0.1 milliGRAM(s) Oral daily  dextrose 40% Gel 15 Gram(s) Oral once  dextrose 5%. 1000 milliLiter(s) (50 mL/Hr) IV Continuous <Continuous>  dextrose 5%. 1000 milliLiter(s) (100 mL/Hr) IV Continuous <Continuous>  dextrose 50% Injectable 25 Gram(s) IV Push once  dextrose 50% Injectable 12.5 Gram(s) IV Push once  dextrose 50% Injectable 25 Gram(s) IV Push once  donepezil 5 milliGRAM(s) Oral at bedtime  ferrous    sulfate 325 milliGRAM(s) Oral daily  glucagon  Injectable 1 milliGRAM(s) IntraMuscular once  insulin lispro (ADMELOG) corrective regimen sliding scale   SubCutaneous three times a day before meals  levothyroxine 125 MICROGram(s) Oral daily  metoprolol succinate ER 50 milliGRAM(s) Oral daily  pantoprazole    Tablet 40 milliGRAM(s) Oral two times a day  PARoxetine 30 milliGRAM(s) Oral daily  QUEtiapine 100 milliGRAM(s) Oral at bedtime    MEDICATIONS  (PRN):  ALBUTerol    90 MICROgram(s) HFA Inhaler 2 Puff(s) Inhalation every 6 hours PRN Shortness of Breath and/or Wheezing        CAPILLARY BLOOD GLUCOSE      POCT Blood Glucose.: 285 mg/dL (09 Sep 2021 12:36)  POCT Blood Glucose.: 163 mg/dL (09 Sep 2021 08:38)  POCT Blood Glucose.: 153 mg/dL (08 Sep 2021 22:16)  POCT Blood Glucose.: 130 mg/dL (08 Sep 2021 17:38)    I&O's Summary    08 Sep 2021 07:01  -  09 Sep 2021 07:00  --------------------------------------------------------  IN: 1140 mL / OUT: 1100 mL / NET: 40 mL    09 Sep 2021 07:01  -  09 Sep 2021 13:28  --------------------------------------------------------  IN: 480 mL / OUT: 0 mL / NET: 480 mL        Vital Signs Last 24 Hrs  T(C): 36.4 (09 Sep 2021 05:10), Max: 36.6 (08 Sep 2021 21:52)  T(F): 97.6 (09 Sep 2021 05:10), Max: 97.9 (08 Sep 2021 21:52)  HR: 61 (09 Sep 2021 05:10) (61 - 63)  BP: 152/75 (09 Sep 2021 05:10) (152/75 - 154/77)  BP(mean): --  RR: 18 (09 Sep 2021 05:10) (18 - 18)  SpO2: 94% (09 Sep 2021 05:10) (94% - 95%)    PHYSICAL EXAM:   HEENT: CAROLINE EOMI  Neck: Supple  Lungs: clear  CVS: S1 S2 soft murmur +   Abdomen: no tenderness  Neuro: Alert non focal   Skin: warm, dry  Ext: no edema      LABS:                        8.5    7.57  )-----------( 257      ( 09 Sep 2021 09:53 )             26.6     09-09    135  |  101  |  33<H>  ----------------------------<  148<H>  3.5   |  21<L>  |  1.37<H>    Ca    9.2      09 Sep 2021 09:53                  All consultant(s) notes reviewed and care discussed with other providers        Contact Number, Dr Melendrez [phone]
Patient is a 88y old  Female who presents with a chief complaint of melena (10 Sep 2021 12:04)      DATE OF SERVICE: 09-10-21 @ 13:27    SUBJECTIVE / OVERNIGHT EVENTS: overnight events noted    ROS:  Resp: No cough no sputum production  CVS: No chest pain no palpitations no orthopnea  GI: no N/V/D  per RN        MEDICATIONS  (STANDING):  amLODIPine   Tablet 5 milliGRAM(s) Oral daily  atorvastatin 40 milliGRAM(s) Oral at bedtime  cloNIDine 0.1 milliGRAM(s) Oral daily  cyanocobalamin 1000 MICROGram(s) Oral daily  cyanocobalamin Injectable 1000 MICROGram(s) IntraMuscular once  dextrose 40% Gel 15 Gram(s) Oral once  dextrose 5%. 1000 milliLiter(s) (50 mL/Hr) IV Continuous <Continuous>  dextrose 5%. 1000 milliLiter(s) (100 mL/Hr) IV Continuous <Continuous>  dextrose 50% Injectable 25 Gram(s) IV Push once  dextrose 50% Injectable 12.5 Gram(s) IV Push once  dextrose 50% Injectable 25 Gram(s) IV Push once  donepezil 5 milliGRAM(s) Oral at bedtime  ferrous    sulfate 325 milliGRAM(s) Oral daily  glucagon  Injectable 1 milliGRAM(s) IntraMuscular once  insulin lispro (ADMELOG) corrective regimen sliding scale   SubCutaneous three times a day before meals  levothyroxine 125 MICROGram(s) Oral daily  metoprolol succinate ER 50 milliGRAM(s) Oral daily  pantoprazole    Tablet 40 milliGRAM(s) Oral two times a day  PARoxetine 30 milliGRAM(s) Oral daily  QUEtiapine 100 milliGRAM(s) Oral at bedtime    MEDICATIONS  (PRN):  ALBUTerol    90 MICROgram(s) HFA Inhaler 2 Puff(s) Inhalation every 6 hours PRN Shortness of Breath and/or Wheezing        CAPILLARY BLOOD GLUCOSE      POCT Blood Glucose.: 340 mg/dL (10 Sep 2021 12:27)  POCT Blood Glucose.: 169 mg/dL (10 Sep 2021 08:37)  POCT Blood Glucose.: 169 mg/dL (09 Sep 2021 21:43)  POCT Blood Glucose.: 124 mg/dL (09 Sep 2021 17:26)    I&O's Summary    09 Sep 2021 07:01  -  10 Sep 2021 07:00  --------------------------------------------------------  IN: 1660 mL / OUT: 2150 mL / NET: -490 mL        Vital Signs Last 24 Hrs  T(C): 36.7 (10 Sep 2021 12:25), Max: 36.8 (09 Sep 2021 13:40)  T(F): 98.1 (10 Sep 2021 12:25), Max: 98.2 (09 Sep 2021 13:40)  HR: 53 (10 Sep 2021 12:25) (53 - 100)  BP: 159/72 (10 Sep 2021 12:25) (133/68 - 168/79)  BP(mean): --  RR: 18 (10 Sep 2021 12:25) (18 - 18)  SpO2: 96% (10 Sep 2021 12:25) (95% - 96%)      PHYSICAL EXAM:   HEENT: CAROLINE EOMI  Neck: Supple  Lungs: clear  CVS: S1 S2 soft murmur +   Abdomen: no tenderness  Neuro: Alert non focal   Skin: warm, dry  Ext: no edema    LABS:                        9.5    9.19  )-----------( 298      ( 10 Sep 2021 07:27 )             29.9     09-09    135  |  101  |  33<H>  ----------------------------<  148<H>  3.5   |  21<L>  |  1.37<H>    Ca    9.2      09 Sep 2021 09:53                  All consultant(s) notes reviewed and care discussed with other providers        Contact Number, Dr Melendrez [phone]

## 2021-09-10 NOTE — PROGRESS NOTE ADULT - PROBLEM SELECTOR PLAN 4
supportive care  fall precautions  B12 testing
supportive care  fall precautions  B12 low normal  replete IM   continue po B12 on discharge
supportive care  fall precautions  B12 low normal  replete IM   start po B12 on discharge

## 2021-11-28 NOTE — DISCHARGE NOTE PROVIDER - NSDCHHATTENDCERT_GEN_ALL_CORE
My signature below certifies that the above stated patient is homebound and upon completion of the Face-To-Face encounter, has the need for intermittent skilled nursing, physical therapy and/or speech or occupational therapy services in their home for their current diagnosis as outlined in their initial plan of care. These services will continue to be monitored by myself or another physician.
Statement Selected

## 2022-01-16 NOTE — PROGRESS NOTE ADULT - SUBJECTIVE AND OBJECTIVE BOX
Patient is a 87y old  Female who presents with a chief complaint of LGI bleed (18 Feb 2020 15:54)      SUBJECTIVE / OVERNIGHT EVENTS: no new developments, s/p 2 units PRBC, s/p large LGI bleed, maintained her BP, daughter wants Dr. Huerta to be involved, spoke to Dr. Huerta,  he is not available. Spoke to Dr. Vaughn, who is away, defers judgenment on flex sig to me. ptn is full code, need to determine the cause of such a heavy bleed. colitis on Left colon on CT scan. flex sig as per GI in am    MEDICATIONS  (STANDING):  atorvastatin 40 milliGRAM(s) Oral at bedtime  budesonide 160 MICROgram(s)/formoterol 4.5 MICROgram(s) Inhaler 2 Puff(s) Inhalation two times a day  dextrose 5%. 1000 milliLiter(s) (50 mL/Hr) IV Continuous <Continuous>  dextrose 50% Injectable 12.5 Gram(s) IV Push once  dextrose 50% Injectable 25 Gram(s) IV Push once  dextrose 50% Injectable 25 Gram(s) IV Push once  donepezil 5 milliGRAM(s) Oral at bedtime  insulin lispro (HumaLOG) corrective regimen sliding scale   SubCutaneous three times a day before meals  insulin lispro (HumaLOG) corrective regimen sliding scale   SubCutaneous at bedtime  levothyroxine 125 MICROGram(s) Oral daily  pantoprazole  Injectable 40 milliGRAM(s) IV Push daily  PARoxetine 30 milliGRAM(s) Oral daily  QUEtiapine 100 milliGRAM(s) Oral at bedtime  sodium chloride 0.9%. 1000 milliLiter(s) (50 mL/Hr) IV Continuous <Continuous>    MEDICATIONS  (PRN):  ALBUTerol    90 MICROgram(s) HFA Inhaler 2 Puff(s) Inhalation every 6 hours PRN Bronchospasm  dextrose 40% Gel 15 Gram(s) Oral once PRN Blood Glucose LESS THAN 70 milliGRAM(s)/deciliter  glucagon  Injectable 1 milliGRAM(s) IntraMuscular once PRN Glucose LESS THAN 70 milligrams/deciliter      Vital Signs Last 24 Hrs  T(F): 99.1 (02-18-20 @ 19:58), Max: 99.1 (02-18-20 @ 19:58)  HR: 85 (02-18-20 @ 20:30) (73 - 88)  BP: 148/79 (02-18-20 @ 20:30) (134/53 - 172/91)  RR: 18 (02-18-20 @ 19:58) (18 - 18)  SpO2: 97% (02-18-20 @ 19:58) (97% - 99%)  Telemetry:   CAPILLARY BLOOD GLUCOSE      POCT Blood Glucose.: 178 mg/dL (18 Feb 2020 21:20)  POCT Blood Glucose.: 123 mg/dL (18 Feb 2020 17:08)  POCT Blood Glucose.: 110 mg/dL (18 Feb 2020 12:33)  POCT Blood Glucose.: 202 mg/dL (18 Feb 2020 08:22)    I&O's Summary    18 Feb 2020 07:01  -  18 Feb 2020 22:22  --------------------------------------------------------  IN: 840 mL / OUT: 0 mL / NET: 840 mL        PHYSICAL EXAM:  GENERAL: NAD, well-developed  HEAD:  Atraumatic, Normocephalic  EYES: EOMI, PERRLA, conjunctiva and sclera clear  NECK: Supple, No JVD  CHEST/LUNG: Clear to auscultation bilaterally; No wheeze  HEART: Regular rate and rhythm; No murmurs, rubs, or gallops  ABDOMEN: Soft, Nontender, Nondistended; Bowel sounds present  EXTREMITIES:  2+ Peripheral Pulses, No clubbing, cyanosis, or edema  PSYCH: AAOx3  NEUROLOGY: non-focal  SKIN: No rashes or lesions    LABS:                        7.8    10.38 )-----------( 136      ( 18 Feb 2020 10:59 )             25.4     02-18    138  |  104  |  33<H>  ----------------------------<  160<H>  4.2   |  22  |  1.48<H>    Ca    8.8      18 Feb 2020 10:59  Phos  3.4     02-18  Mg     1.9     02-18    TPro  6.5  /  Alb  3.1<L>  /  TBili  0.2  /  DBili  x   /  AST  16  /  ALT  11  /  AlkPhos  70  02-17    PT/INR - ( 17 Feb 2020 08:44 )   PT: 11.5 sec;   INR: 1.01 ratio         PTT - ( 17 Feb 2020 08:44 )  PTT:30.6 sec          RADIOLOGY & ADDITIONAL TESTS:    Imaging Personally Reviewed:    Consultant(s) Notes Reviewed:      Care Discussed with Consultants/Other Providers: with chromic suture with 2-0 chromic suture

## 2022-10-04 NOTE — CHART NOTE - NSCHARTNOTESELECT_GEN_ALL_CORE
Addended by: Dev Grayson on: 10/4/2022 06:04 PM     Modules accepted: Level of Service Event Note/hgb 7.1

## 2022-12-31 NOTE — ED PROVIDER NOTE - HIV OFFER
Patient sent to from walk in after she went there for cp- normal ekg- she states that her bp was high there and they advised her to come to er for further evaluation.    Unable to answer due to medical condition/unresponsive/etc...

## 2023-01-02 NOTE — PROVIDER CONTACT NOTE (CRITICAL VALUE NOTIFICATION) - DATE AND TIME:
19-Feb-2020 08:00 19-Feb-2020 08:01 I, Oswald Jeronimo, DO personally saw the patient with FRANCESCA.  I have personally performed a face to face diagnostic evaluation on this patient.  I have reviewed the FRANCESCA note and agree with the history, exam, and plan of care, except as noted.  I personally saw the patient and performed a substantive portion of the visit including all aspects of the medical decision making.

## 2023-03-14 NOTE — PHYSICAL THERAPY INITIAL EVALUATION ADULT - BED MOBILITY TRAINING, PT EVAL
Addended by: Kerwin Irving on: 3/14/2023 05:28 PM     Modules accepted: Orders
Addended by: Nelda Hardy on: 3/14/2023 05:44 PM     Modules accepted: Orders
GOAL: Pt will perform bed mobility w/independence, in 2 weeks.

## 2023-07-20 NOTE — PROVIDER CONTACT NOTE (CRITICAL VALUE NOTIFICATION) - NS PROVIDER READ BACK TO LAB
yes
yes
What Type Of Note Output Would You Prefer (Optional)?: Standard Output
Hpi Title: Evaluation of Skin Lesions

## 2024-01-01 ENCOUNTER — TRANSCRIPTION ENCOUNTER (OUTPATIENT)
Age: 89
End: 2024-01-01

## 2024-01-01 ENCOUNTER — INPATIENT (INPATIENT)
Facility: HOSPITAL | Age: 89
LOS: 4 days | Discharge: HOME | End: 2024-04-06
Attending: HOSPITALIST | Admitting: HOSPITALIST
Payer: MEDICARE

## 2024-01-01 ENCOUNTER — EMERGENCY (EMERGENCY)
Facility: HOSPITAL | Age: 89
LOS: 1 days | End: 2024-01-01
Attending: EMERGENCY MEDICINE
Payer: MEDICARE

## 2024-01-01 ENCOUNTER — RESULT REVIEW (OUTPATIENT)
Age: 89
End: 2024-01-01

## 2024-01-01 VITALS
SYSTOLIC BLOOD PRESSURE: 159 MMHG | OXYGEN SATURATION: 95 % | HEART RATE: 64 BPM | TEMPERATURE: 97 F | DIASTOLIC BLOOD PRESSURE: 98 MMHG | RESPIRATION RATE: 18 BRPM

## 2024-01-01 VITALS
OXYGEN SATURATION: 99 % | DIASTOLIC BLOOD PRESSURE: 89 MMHG | RESPIRATION RATE: 18 BRPM | SYSTOLIC BLOOD PRESSURE: 143 MMHG | HEART RATE: 91 BPM | TEMPERATURE: 98 F | HEIGHT: 61 IN | WEIGHT: 160.06 LBS

## 2024-01-01 VITALS — HEIGHT: 61 IN

## 2024-01-01 DIAGNOSIS — D72.829 ELEVATED WHITE BLOOD CELL COUNT, UNSPECIFIED: ICD-10-CM

## 2024-01-01 DIAGNOSIS — L98.429 NON-PRESSURE CHRONIC ULCER OF BACK WITH UNSPECIFIED SEVERITY: ICD-10-CM

## 2024-01-01 DIAGNOSIS — Z88.8 ALLERGY STATUS TO OTHER DRUGS, MEDICAMENTS AND BIOLOGICAL SUBSTANCES: ICD-10-CM

## 2024-01-01 DIAGNOSIS — Z96.653 PRESENCE OF ARTIFICIAL KNEE JOINT, BILATERAL: ICD-10-CM

## 2024-01-01 DIAGNOSIS — J96.01 ACUTE RESPIRATORY FAILURE WITH HYPOXIA: ICD-10-CM

## 2024-01-01 DIAGNOSIS — R53.81 OTHER MALAISE: ICD-10-CM

## 2024-01-01 DIAGNOSIS — Z51.5 ENCOUNTER FOR PALLIATIVE CARE: ICD-10-CM

## 2024-01-01 DIAGNOSIS — E78.5 HYPERLIPIDEMIA, UNSPECIFIED: ICD-10-CM

## 2024-01-01 DIAGNOSIS — J44.1 CHRONIC OBSTRUCTIVE PULMONARY DISEASE WITH (ACUTE) EXACERBATION: ICD-10-CM

## 2024-01-01 DIAGNOSIS — I10 ESSENTIAL (PRIMARY) HYPERTENSION: ICD-10-CM

## 2024-01-01 DIAGNOSIS — I21.A1 MYOCARDIAL INFARCTION TYPE 2: ICD-10-CM

## 2024-01-01 DIAGNOSIS — Z11.52 ENCOUNTER FOR SCREENING FOR COVID-19: ICD-10-CM

## 2024-01-01 DIAGNOSIS — N39.0 URINARY TRACT INFECTION, SITE NOT SPECIFIED: ICD-10-CM

## 2024-01-01 DIAGNOSIS — Z79.890 HORMONE REPLACEMENT THERAPY: ICD-10-CM

## 2024-01-01 DIAGNOSIS — F32.A DEPRESSION, UNSPECIFIED: ICD-10-CM

## 2024-01-01 DIAGNOSIS — I50.32 CHRONIC DIASTOLIC (CONGESTIVE) HEART FAILURE: ICD-10-CM

## 2024-01-01 DIAGNOSIS — J69.0 PNEUMONITIS DUE TO INHALATION OF FOOD AND VOMIT: ICD-10-CM

## 2024-01-01 DIAGNOSIS — I11.0 HYPERTENSIVE HEART DISEASE WITH HEART FAILURE: ICD-10-CM

## 2024-01-01 DIAGNOSIS — L89.152 PRESSURE ULCER OF SACRAL REGION, STAGE 2: ICD-10-CM

## 2024-01-01 DIAGNOSIS — E87.20 ACIDOSIS, UNSPECIFIED: ICD-10-CM

## 2024-01-01 DIAGNOSIS — I27.20 PULMONARY HYPERTENSION, UNSPECIFIED: ICD-10-CM

## 2024-01-01 DIAGNOSIS — E11.8 TYPE 2 DIABETES MELLITUS WITH UNSPECIFIED COMPLICATIONS: ICD-10-CM

## 2024-01-01 DIAGNOSIS — F03.90 UNSPECIFIED DEMENTIA WITHOUT BEHAVIORAL DISTURBANCE: ICD-10-CM

## 2024-01-01 DIAGNOSIS — Z79.899 OTHER LONG TERM (CURRENT) DRUG THERAPY: ICD-10-CM

## 2024-01-01 DIAGNOSIS — E03.9 HYPOTHYROIDISM, UNSPECIFIED: ICD-10-CM

## 2024-01-01 DIAGNOSIS — Z79.84 LONG TERM (CURRENT) USE OF ORAL HYPOGLYCEMIC DRUGS: ICD-10-CM

## 2024-01-01 DIAGNOSIS — R79.89 OTHER SPECIFIED ABNORMAL FINDINGS OF BLOOD CHEMISTRY: ICD-10-CM

## 2024-01-01 DIAGNOSIS — Z53.8 PROCEDURE AND TREATMENT NOT CARRIED OUT FOR OTHER REASONS: ICD-10-CM

## 2024-01-01 DIAGNOSIS — F03.C0 UNSPECIFIED DEMENTIA, SEVERE, WITHOUT BEHAVIORAL DISTURBANCE, PSYCHOTIC DISTURBANCE, MOOD DISTURBANCE, AND ANXIETY: ICD-10-CM

## 2024-01-01 LAB
A1C WITH ESTIMATED AVERAGE GLUCOSE RESULT: 7.2 % — HIGH (ref 4–5.6)
ALBUMIN SERPL ELPH-MCNC: 2.5 G/DL — LOW (ref 3.3–5)
ALBUMIN SERPL ELPH-MCNC: 2.8 G/DL — LOW (ref 3.3–5)
ALBUMIN SERPL ELPH-MCNC: 3 G/DL — LOW (ref 3.3–5)
ALP SERPL-CCNC: 75 U/L — SIGNIFICANT CHANGE UP (ref 40–120)
ALP SERPL-CCNC: 77 U/L — SIGNIFICANT CHANGE UP (ref 40–120)
ALP SERPL-CCNC: 90 U/L — SIGNIFICANT CHANGE UP (ref 40–120)
ALT FLD-CCNC: 13 U/L — SIGNIFICANT CHANGE UP (ref 12–78)
ALT FLD-CCNC: 13 U/L — SIGNIFICANT CHANGE UP (ref 12–78)
ALT FLD-CCNC: 16 U/L — SIGNIFICANT CHANGE UP (ref 12–78)
ANION GAP SERPL CALC-SCNC: 11 MMOL/L — SIGNIFICANT CHANGE UP (ref 5–17)
ANION GAP SERPL CALC-SCNC: 13 MMOL/L — SIGNIFICANT CHANGE UP (ref 5–17)
ANION GAP SERPL CALC-SCNC: 9 MMOL/L — SIGNIFICANT CHANGE UP (ref 5–17)
APPEARANCE UR: CLEAR — SIGNIFICANT CHANGE UP
AST SERPL-CCNC: 18 U/L — SIGNIFICANT CHANGE UP (ref 15–37)
AST SERPL-CCNC: 24 U/L — SIGNIFICANT CHANGE UP (ref 15–37)
AST SERPL-CCNC: 25 U/L — SIGNIFICANT CHANGE UP (ref 15–37)
BACTERIA # UR AUTO: ABNORMAL /HPF
BASE EXCESS BLDV CALC-SCNC: 0 MMOL/L — SIGNIFICANT CHANGE UP (ref -2–3)
BASOPHILS # BLD AUTO: 0.05 K/UL — SIGNIFICANT CHANGE UP (ref 0–0.2)
BASOPHILS NFR BLD AUTO: 0.3 % — SIGNIFICANT CHANGE UP (ref 0–2)
BILIRUB SERPL-MCNC: 0.1 MG/DL — LOW (ref 0.2–1.2)
BILIRUB SERPL-MCNC: 0.2 MG/DL — SIGNIFICANT CHANGE UP (ref 0.2–1.2)
BILIRUB SERPL-MCNC: 0.3 MG/DL — SIGNIFICANT CHANGE UP (ref 0.2–1.2)
BILIRUB UR-MCNC: NEGATIVE — SIGNIFICANT CHANGE UP
BLOOD GAS COMMENTS, VENOUS: SIGNIFICANT CHANGE UP
BUN SERPL-MCNC: 21 MG/DL — SIGNIFICANT CHANGE UP (ref 7–23)
BUN SERPL-MCNC: 24 MG/DL — HIGH (ref 7–23)
BUN SERPL-MCNC: 35 MG/DL — HIGH (ref 7–23)
CALCIUM SERPL-MCNC: 7.9 MG/DL — LOW (ref 8.5–10.1)
CALCIUM SERPL-MCNC: 9.1 MG/DL — SIGNIFICANT CHANGE UP (ref 8.5–10.1)
CALCIUM SERPL-MCNC: 9.5 MG/DL — SIGNIFICANT CHANGE UP (ref 8.5–10.1)
CHLORIDE BLDV-SCNC: 100 MMOL/L — SIGNIFICANT CHANGE UP (ref 98–107)
CHLORIDE SERPL-SCNC: 103 MMOL/L — SIGNIFICANT CHANGE UP (ref 96–108)
CHLORIDE SERPL-SCNC: 105 MMOL/L — SIGNIFICANT CHANGE UP (ref 96–108)
CHLORIDE SERPL-SCNC: 110 MMOL/L — HIGH (ref 96–108)
CHOLEST SERPL-MCNC: 189 MG/DL — SIGNIFICANT CHANGE UP
CK MB BLD-MCNC: 1.4 % — SIGNIFICANT CHANGE UP (ref 0–3.5)
CK MB BLD-MCNC: 3.2 % — SIGNIFICANT CHANGE UP (ref 0–3.5)
CK MB BLD-MCNC: 4.6 % — HIGH (ref 0–3.5)
CK MB CFR SERPL CALC: 1.8 NG/ML — SIGNIFICANT CHANGE UP (ref 0.5–3.6)
CK MB CFR SERPL CALC: 2.4 NG/ML — SIGNIFICANT CHANGE UP (ref 0.5–3.6)
CK MB CFR SERPL CALC: 2.5 NG/ML — SIGNIFICANT CHANGE UP (ref 0.5–3.6)
CK SERPL-CCNC: 131 U/L — SIGNIFICANT CHANGE UP (ref 26–192)
CK SERPL-CCNC: 54 U/L — SIGNIFICANT CHANGE UP (ref 26–192)
CK SERPL-CCNC: 75 U/L — SIGNIFICANT CHANGE UP (ref 26–192)
CO2 BLDV-SCNC: 28 MMOL/L — HIGH (ref 22–26)
CO2 SERPL-SCNC: 20 MMOL/L — LOW (ref 22–31)
CO2 SERPL-SCNC: 24 MMOL/L — SIGNIFICANT CHANGE UP (ref 22–31)
CO2 SERPL-SCNC: 26 MMOL/L — SIGNIFICANT CHANGE UP (ref 22–31)
COLOR SPEC: YELLOW — SIGNIFICANT CHANGE UP
CREAT SERPL-MCNC: 1.05 MG/DL — SIGNIFICANT CHANGE UP (ref 0.5–1.3)
CREAT SERPL-MCNC: 1.48 MG/DL — HIGH (ref 0.5–1.3)
CREAT SERPL-MCNC: 2.14 MG/DL — HIGH (ref 0.5–1.3)
CULTURE RESULTS: SIGNIFICANT CHANGE UP
DIFF PNL FLD: ABNORMAL
EGFR: 21 ML/MIN/1.73M2 — LOW
EGFR: 33 ML/MIN/1.73M2 — LOW
EGFR: 50 ML/MIN/1.73M2 — LOW
EOSINOPHIL # BLD AUTO: 0.02 K/UL — SIGNIFICANT CHANGE UP (ref 0–0.5)
EOSINOPHIL NFR BLD AUTO: 0.1 % — SIGNIFICANT CHANGE UP (ref 0–6)
EPI CELLS # UR: PRESENT
ESTIMATED AVERAGE GLUCOSE: 160 MG/DL — HIGH (ref 68–114)
FLUAV AG NPH QL: SIGNIFICANT CHANGE UP
FLUBV AG NPH QL: SIGNIFICANT CHANGE UP
GAS PNL BLDV: 133 MMOL/L — LOW (ref 136–145)
GAS PNL BLDV: SIGNIFICANT CHANGE UP
GAS PNL BLDV: SIGNIFICANT CHANGE UP
GLUCOSE BLDC GLUCOMTR-MCNC: 142 MG/DL — HIGH (ref 70–99)
GLUCOSE BLDC GLUCOMTR-MCNC: 145 MG/DL — HIGH (ref 70–99)
GLUCOSE BLDC GLUCOMTR-MCNC: 149 MG/DL — HIGH (ref 70–99)
GLUCOSE BLDC GLUCOMTR-MCNC: 152 MG/DL — HIGH (ref 70–99)
GLUCOSE BLDC GLUCOMTR-MCNC: 161 MG/DL — HIGH (ref 70–99)
GLUCOSE BLDC GLUCOMTR-MCNC: 184 MG/DL — HIGH (ref 70–99)
GLUCOSE BLDC GLUCOMTR-MCNC: 190 MG/DL — HIGH (ref 70–99)
GLUCOSE BLDC GLUCOMTR-MCNC: 196 MG/DL — HIGH (ref 70–99)
GLUCOSE BLDC GLUCOMTR-MCNC: 200 MG/DL — HIGH (ref 70–99)
GLUCOSE BLDC GLUCOMTR-MCNC: 201 MG/DL — HIGH (ref 70–99)
GLUCOSE BLDC GLUCOMTR-MCNC: 214 MG/DL — HIGH (ref 70–99)
GLUCOSE BLDC GLUCOMTR-MCNC: 227 MG/DL — HIGH (ref 70–99)
GLUCOSE BLDC GLUCOMTR-MCNC: 240 MG/DL — HIGH (ref 70–99)
GLUCOSE BLDC GLUCOMTR-MCNC: 241 MG/DL — HIGH (ref 70–99)
GLUCOSE BLDC GLUCOMTR-MCNC: 247 MG/DL — HIGH (ref 70–99)
GLUCOSE BLDC GLUCOMTR-MCNC: 282 MG/DL — HIGH (ref 70–99)
GLUCOSE BLDC GLUCOMTR-MCNC: 292 MG/DL — HIGH (ref 70–99)
GLUCOSE BLDC GLUCOMTR-MCNC: 308 MG/DL — HIGH (ref 70–99)
GLUCOSE BLDC GLUCOMTR-MCNC: 317 MG/DL — HIGH (ref 70–99)
GLUCOSE BLDC GLUCOMTR-MCNC: 339 MG/DL — HIGH (ref 70–99)
GLUCOSE BLDV-MCNC: 240 MG/DL — HIGH (ref 65–95)
GLUCOSE SERPL-MCNC: 143 MG/DL — HIGH (ref 70–99)
GLUCOSE SERPL-MCNC: 152 MG/DL — HIGH (ref 70–99)
GLUCOSE SERPL-MCNC: 199 MG/DL — HIGH (ref 70–99)
GLUCOSE UR QL: 100 MG/DL
HCO3 BLDV-SCNC: 26 MMOL/L — SIGNIFICANT CHANGE UP (ref 22–28)
HCT VFR BLD CALC: 29.1 % — LOW (ref 34.5–45)
HCT VFR BLD CALC: 31.3 % — LOW (ref 34.5–45)
HCT VFR BLD CALC: 33.5 % — LOW (ref 34.5–45)
HCT VFR BLDA CALC: 30 % — LOW (ref 37–47)
HDLC SERPL-MCNC: 63 MG/DL — SIGNIFICANT CHANGE UP
HGB BLD CALC-MCNC: 10.1 G/DL — LOW (ref 11.7–16.1)
HGB BLD-MCNC: 10.5 G/DL — LOW (ref 11.5–15.5)
HGB BLD-MCNC: 9.3 G/DL — LOW (ref 11.5–15.5)
HGB BLD-MCNC: 9.9 G/DL — LOW (ref 11.5–15.5)
HOROWITZ INDEX BLDV+IHG-RTO: 40 — SIGNIFICANT CHANGE UP
IMM GRANULOCYTES NFR BLD AUTO: 0.5 % — SIGNIFICANT CHANGE UP (ref 0–0.9)
KETONES UR-MCNC: NEGATIVE MG/DL — SIGNIFICANT CHANGE UP
LACTATE BLDV-MCNC: 5.5 MMOL/L — CRITICAL HIGH (ref 0.56–1.39)
LACTATE SERPL-SCNC: 3.5 MMOL/L — HIGH (ref 0.7–2)
LACTATE SERPL-SCNC: 4.3 MMOL/L — CRITICAL HIGH (ref 0.7–2)
LEUKOCYTE ESTERASE UR-ACNC: NEGATIVE — SIGNIFICANT CHANGE UP
LIPID PNL WITH DIRECT LDL SERPL: 110 MG/DL — HIGH
LYMPHOCYTES # BLD AUTO: 0.77 K/UL — LOW (ref 1–3.3)
LYMPHOCYTES # BLD AUTO: 4.1 % — LOW (ref 13–44)
MAGNESIUM SERPL-MCNC: 1.5 MG/DL — LOW (ref 1.6–2.6)
MAGNESIUM SERPL-MCNC: 2 MG/DL — SIGNIFICANT CHANGE UP (ref 1.6–2.6)
MCHC RBC-ENTMCNC: 25.7 PG — LOW (ref 27–34)
MCHC RBC-ENTMCNC: 25.8 PG — LOW (ref 27–34)
MCHC RBC-ENTMCNC: 25.8 PG — LOW (ref 27–34)
MCHC RBC-ENTMCNC: 31.3 G/DL — LOW (ref 32–36)
MCHC RBC-ENTMCNC: 31.6 G/DL — LOW (ref 32–36)
MCHC RBC-ENTMCNC: 32 G/DL — SIGNIFICANT CHANGE UP (ref 32–36)
MCV RBC AUTO: 80.8 FL — SIGNIFICANT CHANGE UP (ref 80–100)
MCV RBC AUTO: 81.5 FL — SIGNIFICANT CHANGE UP (ref 80–100)
MCV RBC AUTO: 82.1 FL — SIGNIFICANT CHANGE UP (ref 80–100)
MONOCYTES # BLD AUTO: 0.83 K/UL — SIGNIFICANT CHANGE UP (ref 0–0.9)
MONOCYTES NFR BLD AUTO: 4.4 % — SIGNIFICANT CHANGE UP (ref 2–14)
NEUTROPHILS # BLD AUTO: 17.18 K/UL — HIGH (ref 1.8–7.4)
NEUTROPHILS NFR BLD AUTO: 90.6 % — HIGH (ref 43–77)
NITRITE UR-MCNC: NEGATIVE — SIGNIFICANT CHANGE UP
NON HDL CHOLESTEROL: 125 MG/DL — SIGNIFICANT CHANGE UP
NRBC # BLD: 0 /100 WBCS — SIGNIFICANT CHANGE UP (ref 0–0)
NT-PROBNP SERPL-SCNC: 2076 PG/ML — HIGH (ref 0–450)
PCO2 BLDV: 50 MMHG — SIGNIFICANT CHANGE UP (ref 42–55)
PH BLDV: 7.33 — SIGNIFICANT CHANGE UP (ref 7.32–7.43)
PH UR: 7 — SIGNIFICANT CHANGE UP (ref 5–8)
PHOSPHATE SERPL-MCNC: 3.4 MG/DL — SIGNIFICANT CHANGE UP (ref 2.5–4.5)
PHOSPHATE SERPL-MCNC: 4 MG/DL — SIGNIFICANT CHANGE UP (ref 2.5–4.5)
PLATELET # BLD AUTO: 265 K/UL — SIGNIFICANT CHANGE UP (ref 150–400)
PLATELET # BLD AUTO: 268 K/UL — SIGNIFICANT CHANGE UP (ref 150–400)
PLATELET # BLD AUTO: 302 K/UL — SIGNIFICANT CHANGE UP (ref 150–400)
PO2 BLDV: 38 MMHG — SIGNIFICANT CHANGE UP (ref 25–45)
POTASSIUM BLDV-SCNC: 5 MMOL/L — SIGNIFICANT CHANGE UP (ref 3.5–5.1)
POTASSIUM SERPL-MCNC: 3.5 MMOL/L — SIGNIFICANT CHANGE UP (ref 3.5–5.3)
POTASSIUM SERPL-MCNC: 3.6 MMOL/L — SIGNIFICANT CHANGE UP (ref 3.5–5.3)
POTASSIUM SERPL-MCNC: 4.3 MMOL/L — SIGNIFICANT CHANGE UP (ref 3.5–5.3)
POTASSIUM SERPL-SCNC: 3.5 MMOL/L — SIGNIFICANT CHANGE UP (ref 3.5–5.3)
POTASSIUM SERPL-SCNC: 3.6 MMOL/L — SIGNIFICANT CHANGE UP (ref 3.5–5.3)
POTASSIUM SERPL-SCNC: 4.3 MMOL/L — SIGNIFICANT CHANGE UP (ref 3.5–5.3)
PROT SERPL-MCNC: 6.7 GM/DL — SIGNIFICANT CHANGE UP (ref 6–8.3)
PROT SERPL-MCNC: 7.5 GM/DL — SIGNIFICANT CHANGE UP (ref 6–8.3)
PROT SERPL-MCNC: 8.2 GM/DL — SIGNIFICANT CHANGE UP (ref 6–8.3)
PROT UR-MCNC: 300 MG/DL
RAPID RVP RESULT: SIGNIFICANT CHANGE UP
RBC # BLD: 3.6 M/UL — LOW (ref 3.8–5.2)
RBC # BLD: 3.84 M/UL — SIGNIFICANT CHANGE UP (ref 3.8–5.2)
RBC # BLD: 4.08 M/UL — SIGNIFICANT CHANGE UP (ref 3.8–5.2)
RBC # FLD: 14.6 % — HIGH (ref 10.3–14.5)
RBC # FLD: 14.6 % — HIGH (ref 10.3–14.5)
RBC # FLD: 14.7 % — HIGH (ref 10.3–14.5)
RBC CASTS # UR COMP ASSIST: 1 /HPF — SIGNIFICANT CHANGE UP (ref 0–4)
SAO2 % BLDV: 68 % — LOW (ref 94–98)
SARS-COV-2 RNA SPEC QL NAA+PROBE: SIGNIFICANT CHANGE UP
SARS-COV-2 RNA SPEC QL NAA+PROBE: SIGNIFICANT CHANGE UP
SODIUM SERPL-SCNC: 136 MMOL/L — SIGNIFICANT CHANGE UP (ref 135–145)
SODIUM SERPL-SCNC: 142 MMOL/L — SIGNIFICANT CHANGE UP (ref 135–145)
SODIUM SERPL-SCNC: 143 MMOL/L — SIGNIFICANT CHANGE UP (ref 135–145)
SP GR SPEC: 1.03 — SIGNIFICANT CHANGE UP (ref 1–1.03)
SPECIMEN SOURCE: SIGNIFICANT CHANGE UP
TRIGL SERPL-MCNC: 87 MG/DL — SIGNIFICANT CHANGE UP
TROPONIN I, HIGH SENSITIVITY RESULT: 451.9 NG/L — HIGH
TROPONIN I, HIGH SENSITIVITY RESULT: 795.3 NG/L — HIGH
TROPONIN I, HIGH SENSITIVITY RESULT: 947.1 NG/L — HIGH
TROPONIN I, HIGH SENSITIVITY RESULT: 988.4 NG/L — HIGH
UROBILINOGEN FLD QL: 0.2 MG/DL — SIGNIFICANT CHANGE UP (ref 0.2–1)
WBC # BLD: 11.63 K/UL — HIGH (ref 3.8–10.5)
WBC # BLD: 17.65 K/UL — HIGH (ref 3.8–10.5)
WBC # BLD: 18.94 K/UL — HIGH (ref 3.8–10.5)
WBC # FLD AUTO: 11.63 K/UL — HIGH (ref 3.8–10.5)
WBC # FLD AUTO: 17.65 K/UL — HIGH (ref 3.8–10.5)
WBC # FLD AUTO: 18.94 K/UL — HIGH (ref 3.8–10.5)
WBC UR QL: 0 /HPF — SIGNIFICANT CHANGE UP (ref 0–5)

## 2024-01-01 PROCEDURE — 99232 SBSQ HOSP IP/OBS MODERATE 35: CPT

## 2024-01-01 PROCEDURE — 93010 ELECTROCARDIOGRAM REPORT: CPT

## 2024-01-01 PROCEDURE — 74174 CTA ABD&PLVS W/CONTRAST: CPT | Mod: 26,MC

## 2024-01-01 PROCEDURE — 93306 TTE W/DOPPLER COMPLETE: CPT | Mod: 26

## 2024-01-01 PROCEDURE — 99291 CRITICAL CARE FIRST HOUR: CPT

## 2024-01-01 PROCEDURE — 82962 GLUCOSE BLOOD TEST: CPT

## 2024-01-01 PROCEDURE — 99285 EMERGENCY DEPT VISIT HI MDM: CPT

## 2024-01-01 PROCEDURE — 99223 1ST HOSP IP/OBS HIGH 75: CPT | Mod: FS

## 2024-01-01 PROCEDURE — 71250 CT THORAX DX C-: CPT | Mod: 26,59,MC

## 2024-01-01 PROCEDURE — 99497 ADVNCD CARE PLAN 30 MIN: CPT | Mod: 25

## 2024-01-01 PROCEDURE — 71045 X-RAY EXAM CHEST 1 VIEW: CPT | Mod: 26

## 2024-01-01 PROCEDURE — 99223 1ST HOSP IP/OBS HIGH 75: CPT

## 2024-01-01 PROCEDURE — 99285 EMERGENCY DEPT VISIT HI MDM: CPT | Mod: 25,GC

## 2024-01-01 PROCEDURE — 92950 HEART/LUNG RESUSCITATION CPR: CPT | Mod: GC

## 2024-01-01 PROCEDURE — 71275 CT ANGIOGRAPHY CHEST: CPT | Mod: 26,MC

## 2024-01-01 PROCEDURE — 99239 HOSP IP/OBS DSCHRG MGMT >30: CPT

## 2024-01-01 RX ORDER — ATORVASTATIN CALCIUM 80 MG/1
40 TABLET, FILM COATED ORAL AT BEDTIME
Refills: 0 | Status: DISCONTINUED | OUTPATIENT
Start: 2024-01-01 | End: 2024-01-01

## 2024-01-01 RX ORDER — LANOLIN ALCOHOL/MO/W.PET/CERES
3 CREAM (GRAM) TOPICAL AT BEDTIME
Refills: 0 | Status: DISCONTINUED | OUTPATIENT
Start: 2024-01-01 | End: 2024-01-01

## 2024-01-01 RX ORDER — DEXTROSE 50 % IN WATER 50 %
25 SYRINGE (ML) INTRAVENOUS ONCE
Refills: 0 | Status: DISCONTINUED | OUTPATIENT
Start: 2024-01-01 | End: 2024-01-01

## 2024-01-01 RX ORDER — PREGABALIN 225 MG/1
1000 CAPSULE ORAL DAILY
Refills: 0 | Status: DISCONTINUED | OUTPATIENT
Start: 2024-01-01 | End: 2024-01-01

## 2024-01-01 RX ORDER — LEVOTHYROXINE SODIUM 125 MCG
125 TABLET ORAL DAILY
Refills: 0 | Status: DISCONTINUED | OUTPATIENT
Start: 2024-01-01 | End: 2024-01-01

## 2024-01-01 RX ORDER — FUROSEMIDE 40 MG
40 TABLET ORAL DAILY
Refills: 0 | Status: DISCONTINUED | OUTPATIENT
Start: 2024-01-01 | End: 2024-01-01

## 2024-01-01 RX ORDER — SODIUM CHLORIDE 9 MG/ML
1000 INJECTION, SOLUTION INTRAVENOUS
Refills: 0 | Status: DISCONTINUED | OUTPATIENT
Start: 2024-01-01 | End: 2024-01-01

## 2024-01-01 RX ORDER — IPRATROPIUM/ALBUTEROL SULFATE 18-103MCG
3 AEROSOL WITH ADAPTER (GRAM) INHALATION EVERY 4 HOURS
Refills: 0 | Status: DISCONTINUED | OUTPATIENT
Start: 2024-01-01 | End: 2024-01-01

## 2024-01-01 RX ORDER — ASPIRIN/CALCIUM CARB/MAGNESIUM 324 MG
300 TABLET ORAL ONCE
Refills: 0 | Status: COMPLETED | OUTPATIENT
Start: 2024-01-01 | End: 2024-01-01

## 2024-01-01 RX ORDER — ENOXAPARIN SODIUM 100 MG/ML
30 INJECTION SUBCUTANEOUS EVERY 24 HOURS
Refills: 0 | Status: DISCONTINUED | OUTPATIENT
Start: 2024-01-01 | End: 2024-01-01

## 2024-01-01 RX ORDER — DEXTROSE 50 % IN WATER 50 %
12.5 SYRINGE (ML) INTRAVENOUS ONCE
Refills: 0 | Status: DISCONTINUED | OUTPATIENT
Start: 2024-01-01 | End: 2024-01-01

## 2024-01-01 RX ORDER — INSULIN GLARGINE 100 [IU]/ML
2 INJECTION, SOLUTION SUBCUTANEOUS AT BEDTIME
Refills: 0 | Status: DISCONTINUED | OUTPATIENT
Start: 2024-01-01 | End: 2024-01-01

## 2024-01-01 RX ORDER — ASPIRIN/CALCIUM CARB/MAGNESIUM 324 MG
1 TABLET ORAL
Qty: 0 | Refills: 0 | DISCHARGE
Start: 2024-01-01

## 2024-01-01 RX ORDER — IPRATROPIUM/ALBUTEROL SULFATE 18-103MCG
3 AEROSOL WITH ADAPTER (GRAM) INHALATION EVERY 6 HOURS
Refills: 0 | Status: DISCONTINUED | OUTPATIENT
Start: 2024-01-01 | End: 2024-01-01

## 2024-01-01 RX ORDER — METOPROLOL TARTRATE 50 MG
50 TABLET ORAL DAILY
Refills: 0 | Status: DISCONTINUED | OUTPATIENT
Start: 2024-01-01 | End: 2024-01-01

## 2024-01-01 RX ORDER — PANTOPRAZOLE SODIUM 20 MG/1
40 TABLET, DELAYED RELEASE ORAL DAILY
Refills: 0 | Status: DISCONTINUED | OUTPATIENT
Start: 2024-01-01 | End: 2024-01-01

## 2024-01-01 RX ORDER — FUROSEMIDE 40 MG
1 TABLET ORAL
Qty: 0 | Refills: 0 | DISCHARGE

## 2024-01-01 RX ORDER — DONEPEZIL HYDROCHLORIDE 10 MG/1
1 TABLET, FILM COATED ORAL
Qty: 0 | Refills: 0 | DISCHARGE

## 2024-01-01 RX ORDER — FERROUS SULFATE 325(65) MG
325 TABLET ORAL DAILY
Refills: 0 | Status: DISCONTINUED | OUTPATIENT
Start: 2024-01-01 | End: 2024-01-01

## 2024-01-01 RX ORDER — FUROSEMIDE 40 MG
40 TABLET ORAL ONCE
Refills: 0 | Status: COMPLETED | OUTPATIENT
Start: 2024-01-01 | End: 2024-01-01

## 2024-01-01 RX ORDER — PANTOPRAZOLE SODIUM 20 MG/1
40 TABLET, DELAYED RELEASE ORAL
Refills: 0 | Status: DISCONTINUED | OUTPATIENT
Start: 2024-01-01 | End: 2024-01-01

## 2024-01-01 RX ORDER — CEFTRIAXONE 500 MG/1
1000 INJECTION, POWDER, FOR SOLUTION INTRAMUSCULAR; INTRAVENOUS ONCE
Refills: 0 | Status: COMPLETED | OUTPATIENT
Start: 2024-01-01 | End: 2024-01-01

## 2024-01-01 RX ORDER — OLANZAPINE 15 MG/1
5 TABLET, FILM COATED ORAL ONCE
Refills: 0 | Status: COMPLETED | OUTPATIENT
Start: 2024-01-01 | End: 2024-01-01

## 2024-01-01 RX ORDER — ACETAMINOPHEN 500 MG
650 TABLET ORAL EVERY 6 HOURS
Refills: 0 | Status: DISCONTINUED | OUTPATIENT
Start: 2024-01-01 | End: 2024-01-01

## 2024-01-01 RX ORDER — AZITHROMYCIN 500 MG/1
500 TABLET, FILM COATED ORAL ONCE
Refills: 0 | Status: COMPLETED | OUTPATIENT
Start: 2024-01-01 | End: 2024-01-01

## 2024-01-01 RX ORDER — INSULIN LISPRO 100/ML
4 VIAL (ML) SUBCUTANEOUS ONCE
Refills: 0 | Status: COMPLETED | OUTPATIENT
Start: 2024-01-01 | End: 2024-01-01

## 2024-01-01 RX ORDER — POLYETHYLENE GLYCOL 3350 17 G/17G
17 POWDER, FOR SOLUTION ORAL AT BEDTIME
Refills: 0 | Status: DISCONTINUED | OUTPATIENT
Start: 2024-01-01 | End: 2024-01-01

## 2024-01-01 RX ORDER — DONEPEZIL HYDROCHLORIDE 10 MG/1
5 TABLET, FILM COATED ORAL AT BEDTIME
Refills: 0 | Status: DISCONTINUED | OUTPATIENT
Start: 2024-01-01 | End: 2024-01-01

## 2024-01-01 RX ORDER — LEVOTHYROXINE SODIUM 125 MCG
1 TABLET ORAL
Qty: 0 | Refills: 0 | DISCHARGE
Start: 2024-01-01

## 2024-01-01 RX ORDER — CEFPODOXIME PROXETIL 100 MG
1 TABLET ORAL
Qty: 8 | Refills: 0
Start: 2024-01-01 | End: 2024-01-01

## 2024-01-01 RX ORDER — FUROSEMIDE 40 MG
1 TABLET ORAL
Qty: 0 | Refills: 0 | DISCHARGE
Start: 2024-01-01

## 2024-01-01 RX ORDER — PREGABALIN 225 MG/1
1 CAPSULE ORAL
Qty: 0 | Refills: 0 | DISCHARGE
Start: 2024-01-01

## 2024-01-01 RX ORDER — INSULIN LISPRO 100/ML
VIAL (ML) SUBCUTANEOUS AT BEDTIME
Refills: 0 | Status: DISCONTINUED | OUTPATIENT
Start: 2024-01-01 | End: 2024-01-01

## 2024-01-01 RX ORDER — INSULIN LISPRO 100/ML
VIAL (ML) SUBCUTANEOUS
Refills: 0 | Status: DISCONTINUED | OUTPATIENT
Start: 2024-01-01 | End: 2024-01-01

## 2024-01-01 RX ORDER — LEVALBUTEROL 1.25 MG/.5ML
0.63 SOLUTION, CONCENTRATE RESPIRATORY (INHALATION) EVERY 6 HOURS
Refills: 0 | Status: DISCONTINUED | OUTPATIENT
Start: 2024-01-01 | End: 2024-01-01

## 2024-01-01 RX ORDER — ASPIRIN/CALCIUM CARB/MAGNESIUM 324 MG
162 TABLET ORAL ONCE
Refills: 0 | Status: DISCONTINUED | OUTPATIENT
Start: 2024-01-01 | End: 2024-01-01

## 2024-01-01 RX ORDER — BUDESONIDE AND FORMOTEROL FUMARATE DIHYDRATE 160; 4.5 UG/1; UG/1
2 AEROSOL RESPIRATORY (INHALATION)
Refills: 0 | Status: DISCONTINUED | OUTPATIENT
Start: 2024-01-01 | End: 2024-01-01

## 2024-01-01 RX ORDER — GLUCAGON INJECTION, SOLUTION 0.5 MG/.1ML
1 INJECTION, SOLUTION SUBCUTANEOUS ONCE
Refills: 0 | Status: DISCONTINUED | OUTPATIENT
Start: 2024-01-01 | End: 2024-01-01

## 2024-01-01 RX ORDER — ENOXAPARIN SODIUM 100 MG/ML
40 INJECTION SUBCUTANEOUS EVERY 24 HOURS
Refills: 0 | Status: DISCONTINUED | OUTPATIENT
Start: 2024-01-01 | End: 2024-01-01

## 2024-01-01 RX ORDER — QUETIAPINE FUMARATE 200 MG/1
50 TABLET, FILM COATED ORAL AT BEDTIME
Refills: 0 | Status: DISCONTINUED | OUTPATIENT
Start: 2024-01-01 | End: 2024-01-01

## 2024-01-01 RX ORDER — ACETAMINOPHEN 500 MG
1000 TABLET ORAL ONCE
Refills: 0 | Status: COMPLETED | OUTPATIENT
Start: 2024-01-01 | End: 2024-01-01

## 2024-01-01 RX ORDER — ASPIRIN/CALCIUM CARB/MAGNESIUM 324 MG
81 TABLET ORAL DAILY
Refills: 0 | Status: DISCONTINUED | OUTPATIENT
Start: 2024-01-01 | End: 2024-01-01

## 2024-01-01 RX ORDER — CEFTRIAXONE 500 MG/1
1000 INJECTION, POWDER, FOR SOLUTION INTRAMUSCULAR; INTRAVENOUS EVERY 24 HOURS
Refills: 0 | Status: DISCONTINUED | OUTPATIENT
Start: 2024-01-01 | End: 2024-01-01

## 2024-01-01 RX ORDER — DEXTROSE 50 % IN WATER 50 %
15 SYRINGE (ML) INTRAVENOUS ONCE
Refills: 0 | Status: DISCONTINUED | OUTPATIENT
Start: 2024-01-01 | End: 2024-01-01

## 2024-01-01 RX ADMIN — Medication 3 MILLILITER(S): at 11:20

## 2024-01-01 RX ADMIN — Medication 3 MILLILITER(S): at 17:03

## 2024-01-01 RX ADMIN — DONEPEZIL HYDROCHLORIDE 5 MILLIGRAM(S): 10 TABLET, FILM COATED ORAL at 22:31

## 2024-01-01 RX ADMIN — POLYETHYLENE GLYCOL 3350 17 GRAM(S): 17 POWDER, FOR SOLUTION ORAL at 22:31

## 2024-01-01 RX ADMIN — CEFTRIAXONE 100 MILLIGRAM(S): 500 INJECTION, POWDER, FOR SOLUTION INTRAMUSCULAR; INTRAVENOUS at 11:29

## 2024-01-01 RX ADMIN — Medication 1: at 12:30

## 2024-01-01 RX ADMIN — Medication 50 MILLIGRAM(S): at 05:27

## 2024-01-01 RX ADMIN — Medication 0.1 MILLIGRAM(S): at 06:10

## 2024-01-01 RX ADMIN — Medication 125 MICROGRAM(S): at 05:06

## 2024-01-01 RX ADMIN — Medication 0.1 MILLIGRAM(S): at 05:05

## 2024-01-01 RX ADMIN — Medication 50 MILLIGRAM(S): at 05:05

## 2024-01-01 RX ADMIN — ENOXAPARIN SODIUM 30 MILLIGRAM(S): 100 INJECTION SUBCUTANEOUS at 12:34

## 2024-01-01 RX ADMIN — BUDESONIDE AND FORMOTEROL FUMARATE DIHYDRATE 2 PUFF(S): 160; 4.5 AEROSOL RESPIRATORY (INHALATION) at 17:32

## 2024-01-01 RX ADMIN — Medication 81 MILLIGRAM(S): at 11:20

## 2024-01-01 RX ADMIN — POLYETHYLENE GLYCOL 3350 17 GRAM(S): 17 POWDER, FOR SOLUTION ORAL at 21:28

## 2024-01-01 RX ADMIN — Medication 0.1 MILLIGRAM(S): at 05:18

## 2024-01-01 RX ADMIN — Medication 3 MILLILITER(S): at 17:11

## 2024-01-01 RX ADMIN — QUETIAPINE FUMARATE 50 MILLIGRAM(S): 200 TABLET, FILM COATED ORAL at 22:32

## 2024-01-01 RX ADMIN — BUDESONIDE AND FORMOTEROL FUMARATE DIHYDRATE 2 PUFF(S): 160; 4.5 AEROSOL RESPIRATORY (INHALATION) at 05:05

## 2024-01-01 RX ADMIN — Medication 3 MILLILITER(S): at 12:09

## 2024-01-01 RX ADMIN — Medication 1000 MILLIGRAM(S): at 04:15

## 2024-01-01 RX ADMIN — BUDESONIDE AND FORMOTEROL FUMARATE DIHYDRATE 2 PUFF(S): 160; 4.5 AEROSOL RESPIRATORY (INHALATION) at 05:28

## 2024-01-01 RX ADMIN — Medication 400 MILLIGRAM(S): at 03:56

## 2024-01-01 RX ADMIN — Medication 40 MILLIGRAM(S): at 10:25

## 2024-01-01 RX ADMIN — QUETIAPINE FUMARATE 50 MILLIGRAM(S): 200 TABLET, FILM COATED ORAL at 21:17

## 2024-01-01 RX ADMIN — ENOXAPARIN SODIUM 30 MILLIGRAM(S): 100 INJECTION SUBCUTANEOUS at 12:49

## 2024-01-01 RX ADMIN — PANTOPRAZOLE SODIUM 40 MILLIGRAM(S): 20 TABLET, DELAYED RELEASE ORAL at 06:06

## 2024-01-01 RX ADMIN — Medication 40 MILLIGRAM(S): at 12:34

## 2024-01-01 RX ADMIN — DONEPEZIL HYDROCHLORIDE 5 MILLIGRAM(S): 10 TABLET, FILM COATED ORAL at 21:17

## 2024-01-01 RX ADMIN — Medication 40 MILLIGRAM(S): at 01:54

## 2024-01-01 RX ADMIN — Medication 1: at 08:08

## 2024-01-01 RX ADMIN — Medication 3 MILLILITER(S): at 05:38

## 2024-01-01 RX ADMIN — PANTOPRAZOLE SODIUM 40 MILLIGRAM(S): 20 TABLET, DELAYED RELEASE ORAL at 05:06

## 2024-01-01 RX ADMIN — Medication 40 MILLIGRAM(S): at 12:31

## 2024-01-01 RX ADMIN — PANTOPRAZOLE SODIUM 40 MILLIGRAM(S): 20 TABLET, DELAYED RELEASE ORAL at 05:18

## 2024-01-01 RX ADMIN — Medication 125 MICROGRAM(S): at 05:34

## 2024-01-01 RX ADMIN — CEFTRIAXONE 1000 MILLIGRAM(S): 500 INJECTION, POWDER, FOR SOLUTION INTRAMUSCULAR; INTRAVENOUS at 00:20

## 2024-01-01 RX ADMIN — Medication 40 MILLIGRAM(S): at 06:06

## 2024-01-01 RX ADMIN — Medication 50 MILLIGRAM(S): at 06:02

## 2024-01-01 RX ADMIN — Medication 125 MICROGRAM(S): at 06:05

## 2024-01-01 RX ADMIN — ATORVASTATIN CALCIUM 40 MILLIGRAM(S): 80 TABLET, FILM COATED ORAL at 22:19

## 2024-01-01 RX ADMIN — Medication 40 MILLIGRAM(S): at 05:35

## 2024-01-01 RX ADMIN — Medication 1: at 08:52

## 2024-01-01 RX ADMIN — QUETIAPINE FUMARATE 50 MILLIGRAM(S): 200 TABLET, FILM COATED ORAL at 21:47

## 2024-01-01 RX ADMIN — ENOXAPARIN SODIUM 30 MILLIGRAM(S): 100 INJECTION SUBCUTANEOUS at 12:28

## 2024-01-01 RX ADMIN — AZITHROMYCIN 255 MILLIGRAM(S): 500 TABLET, FILM COATED ORAL at 00:20

## 2024-01-01 RX ADMIN — Medication 40 MILLIGRAM(S): at 05:22

## 2024-01-01 RX ADMIN — Medication 40 MILLIGRAM(S): at 05:05

## 2024-01-01 RX ADMIN — Medication 3 MILLILITER(S): at 05:37

## 2024-01-01 RX ADMIN — CEFTRIAXONE 100 MILLIGRAM(S): 500 INJECTION, POWDER, FOR SOLUTION INTRAMUSCULAR; INTRAVENOUS at 12:50

## 2024-01-01 RX ADMIN — Medication 3: at 17:33

## 2024-01-01 RX ADMIN — Medication 3 MILLILITER(S): at 11:07

## 2024-01-01 RX ADMIN — Medication 40 MILLIGRAM(S): at 11:20

## 2024-01-01 RX ADMIN — PREGABALIN 1000 MICROGRAM(S): 225 CAPSULE ORAL at 12:35

## 2024-01-01 RX ADMIN — Medication 40 MILLIGRAM(S): at 05:18

## 2024-01-01 RX ADMIN — Medication 1000 MILLIGRAM(S): at 04:40

## 2024-01-01 RX ADMIN — OLANZAPINE 5 MILLIGRAM(S): 15 TABLET, FILM COATED ORAL at 07:27

## 2024-01-01 RX ADMIN — PREGABALIN 1000 MICROGRAM(S): 225 CAPSULE ORAL at 12:23

## 2024-01-01 RX ADMIN — Medication 3: at 12:56

## 2024-01-01 RX ADMIN — CEFTRIAXONE 100 MILLIGRAM(S): 500 INJECTION, POWDER, FOR SOLUTION INTRAMUSCULAR; INTRAVENOUS at 12:33

## 2024-01-01 RX ADMIN — Medication 2: at 18:32

## 2024-01-01 RX ADMIN — Medication 3 MILLILITER(S): at 23:24

## 2024-01-01 RX ADMIN — Medication 3 MILLILITER(S): at 23:20

## 2024-01-01 RX ADMIN — Medication 50 MILLIGRAM(S): at 06:06

## 2024-01-01 RX ADMIN — Medication 40 MILLIGRAM(S): at 12:23

## 2024-01-01 RX ADMIN — Medication 325 MILLIGRAM(S): at 12:23

## 2024-01-01 RX ADMIN — CEFTRIAXONE 100 MILLIGRAM(S): 500 INJECTION, POWDER, FOR SOLUTION INTRAMUSCULAR; INTRAVENOUS at 23:49

## 2024-01-01 RX ADMIN — Medication 325 MILLIGRAM(S): at 12:50

## 2024-01-01 RX ADMIN — ENOXAPARIN SODIUM 30 MILLIGRAM(S): 100 INJECTION SUBCUTANEOUS at 12:23

## 2024-01-01 RX ADMIN — Medication 2: at 21:46

## 2024-01-01 RX ADMIN — QUETIAPINE FUMARATE 50 MILLIGRAM(S): 200 TABLET, FILM COATED ORAL at 22:19

## 2024-01-01 RX ADMIN — PANTOPRAZOLE SODIUM 40 MILLIGRAM(S): 20 TABLET, DELAYED RELEASE ORAL at 11:20

## 2024-01-01 RX ADMIN — BUDESONIDE AND FORMOTEROL FUMARATE DIHYDRATE 2 PUFF(S): 160; 4.5 AEROSOL RESPIRATORY (INHALATION) at 05:35

## 2024-01-01 RX ADMIN — PREGABALIN 1000 MICROGRAM(S): 225 CAPSULE ORAL at 12:31

## 2024-01-01 RX ADMIN — Medication 4: at 17:29

## 2024-01-01 RX ADMIN — Medication 325 MILLIGRAM(S): at 12:31

## 2024-01-01 RX ADMIN — Medication 3 MILLILITER(S): at 11:17

## 2024-01-01 RX ADMIN — PREGABALIN 1000 MICROGRAM(S): 225 CAPSULE ORAL at 12:50

## 2024-01-01 RX ADMIN — INSULIN GLARGINE 2 UNIT(S): 100 INJECTION, SOLUTION SUBCUTANEOUS at 21:25

## 2024-01-01 RX ADMIN — CEFTRIAXONE 100 MILLIGRAM(S): 500 INJECTION, POWDER, FOR SOLUTION INTRAMUSCULAR; INTRAVENOUS at 12:24

## 2024-01-01 RX ADMIN — Medication 125 MICROGRAM(S): at 05:18

## 2024-01-01 RX ADMIN — BUDESONIDE AND FORMOTEROL FUMARATE DIHYDRATE 2 PUFF(S): 160; 4.5 AEROSOL RESPIRATORY (INHALATION) at 17:33

## 2024-01-01 RX ADMIN — Medication 81 MILLIGRAM(S): at 12:34

## 2024-01-01 RX ADMIN — Medication 3 MILLILITER(S): at 11:05

## 2024-01-01 RX ADMIN — DONEPEZIL HYDROCHLORIDE 5 MILLIGRAM(S): 10 TABLET, FILM COATED ORAL at 21:47

## 2024-01-01 RX ADMIN — ENOXAPARIN SODIUM 30 MILLIGRAM(S): 100 INJECTION SUBCUTANEOUS at 11:29

## 2024-01-01 RX ADMIN — Medication 3 MILLILITER(S): at 05:22

## 2024-01-01 RX ADMIN — Medication 3 MILLILITER(S): at 23:01

## 2024-01-01 RX ADMIN — Medication 2: at 11:52

## 2024-01-01 RX ADMIN — PANTOPRAZOLE SODIUM 40 MILLIGRAM(S): 20 TABLET, DELAYED RELEASE ORAL at 17:29

## 2024-01-01 RX ADMIN — BUDESONIDE AND FORMOTEROL FUMARATE DIHYDRATE 2 PUFF(S): 160; 4.5 AEROSOL RESPIRATORY (INHALATION) at 06:06

## 2024-01-01 RX ADMIN — Medication 3 MILLILITER(S): at 18:04

## 2024-01-01 RX ADMIN — Medication 300 MILLIGRAM(S): at 05:06

## 2024-01-01 RX ADMIN — ATORVASTATIN CALCIUM 40 MILLIGRAM(S): 80 TABLET, FILM COATED ORAL at 21:47

## 2024-01-01 RX ADMIN — INSULIN GLARGINE 2 UNIT(S): 100 INJECTION, SOLUTION SUBCUTANEOUS at 22:30

## 2024-01-01 RX ADMIN — Medication 3 MILLILITER(S): at 05:36

## 2024-01-01 RX ADMIN — Medication 3 MILLILITER(S): at 23:15

## 2024-01-01 RX ADMIN — ATORVASTATIN CALCIUM 40 MILLIGRAM(S): 80 TABLET, FILM COATED ORAL at 22:31

## 2024-01-01 RX ADMIN — Medication 40 MILLIGRAM(S): at 06:05

## 2024-01-01 RX ADMIN — Medication 40 MILLIGRAM(S): at 05:19

## 2024-01-01 RX ADMIN — Medication 0.1 MILLIGRAM(S): at 05:35

## 2024-01-01 RX ADMIN — CEFTRIAXONE 100 MILLIGRAM(S): 500 INJECTION, POWDER, FOR SOLUTION INTRAMUSCULAR; INTRAVENOUS at 12:28

## 2024-01-01 RX ADMIN — ATORVASTATIN CALCIUM 40 MILLIGRAM(S): 80 TABLET, FILM COATED ORAL at 21:17

## 2024-01-01 RX ADMIN — Medication 4: at 17:32

## 2024-01-01 RX ADMIN — Medication 81 MILLIGRAM(S): at 12:23

## 2024-01-01 RX ADMIN — Medication 325 MILLIGRAM(S): at 12:34

## 2024-01-01 RX ADMIN — Medication 3 MILLILITER(S): at 05:06

## 2024-01-01 RX ADMIN — PREGABALIN 1000 MICROGRAM(S): 225 CAPSULE ORAL at 11:20

## 2024-01-01 RX ADMIN — BUDESONIDE AND FORMOTEROL FUMARATE DIHYDRATE 2 PUFF(S): 160; 4.5 AEROSOL RESPIRATORY (INHALATION) at 18:18

## 2024-01-01 RX ADMIN — Medication 2: at 11:19

## 2024-01-01 RX ADMIN — Medication 81 MILLIGRAM(S): at 12:50

## 2024-01-01 RX ADMIN — Medication 40 MILLIGRAM(S): at 12:50

## 2024-03-05 NOTE — PROGRESS NOTE ADULT - SUBJECTIVE AND OBJECTIVE BOX
Physical Therapy Visit    Visit Type: Daily Treatment Note  Visit: 8  Referring Provider: Jose Grewal MD  Medical Diagnosis (from order): G20.A2 - Parkinson's disease with fluctuating manifestations, unspecified whether dyskinesia present  R26.89 - Primary freezing of gait     Diagnosis Precautions: Fall    SUBJECTIVE                                                                                                               Patient verbally consented to allow observer present during session.    Patient saw MD and did not need an x ray on his hand   He is still having pain in his hand and per patient, he is supposed to wear the brace in the home.       OBJECTIVE                                                                                                                           Standing Balance  (CHRISTINE = base of support)  Tandem / Sharpened Rhomberg     - Left in Front, Eyes Open (sec): 8     - Left in Front, Eyes Open details: supervision     - Right in Front, Eyes Open (sec): 18     - Right in Front, Eyes Open details: supervision  Upper extremity's assist to get into position of sharpened rhomberg     Balance Tests   Timed Up and Go (TUG)      - Total time to complete: 15 sec, stable on turns     - Assistive device: gait belt used    Interpretation: < 10 seconds = normal; > or = 12 seconds is a positive screen for fall risk based on     CDC STEADI tool kit; > or = 13.5 seconds has been shown to indicate high risk for falls     high risk of falls            Treatment     Therapeutic Exercise  LAQ red band in sit figure 8 R and L 3 x 10   Side step red theraband around large mat with contact guard assist and max verbal for patient to take large steps. 15 feet r and 15 feet left     Therapeutic Activity  Balance stepping in place to promote endurance needed for better balance, foot clearance for safety of gait, balance and repositioning in tight areas, posture with repositioning :   Forw R and L over  Subjective: Patient seen and examined. No new events except as noted.     SUBJECTIVE/ROS:  NAD      MEDICATIONS:  MEDICATIONS  (STANDING):  amLODIPine   Tablet 10 milliGRAM(s) Oral daily  atorvastatin 40 milliGRAM(s) Oral at bedtime  budesonide 160 MICROgram(s)/formoterol 4.5 MICROgram(s) Inhaler 2 Puff(s) Inhalation two times a day  cefTRIAXone   IVPB      cefTRIAXone   IVPB 1000 milliGRAM(s) IV Intermittent every 24 hours  cloNIDine 0.1 milliGRAM(s) Oral daily  dextrose 50% Injectable 12.5 Gram(s) IV Push once  dextrose 50% Injectable 25 Gram(s) IV Push once  dextrose 50% Injectable 25 Gram(s) IV Push once  donepezil 5 milliGRAM(s) Oral at bedtime  insulin lispro (HumaLOG) corrective regimen sliding scale   SubCutaneous three times a day before meals  insulin lispro (HumaLOG) corrective regimen sliding scale   SubCutaneous at bedtime  levothyroxine 125 MICROGram(s) Oral daily  metoprolol succinate ER 50 milliGRAM(s) Oral daily  pantoprazole  Injectable 40 milliGRAM(s) IV Push daily  PARoxetine 30 milliGRAM(s) Oral daily  QUEtiapine 100 milliGRAM(s) Oral at bedtime      PHYSICAL EXAM:  T(C): 36.7 (02-22-20 @ 08:14), Max: 36.7 (02-21-20 @ 21:05)  HR: 56 (02-22-20 @ 08:14) (56 - 62)  BP: 147/66 (02-22-20 @ 08:14) (145/67 - 190/77)  RR: 18 (02-22-20 @ 08:14) (18 - 18)  SpO2: 96% (02-22-20 @ 08:14) (96% - 100%)  Wt(kg): --  I&O's Summary          JVP: Normal  Neck: supple  Lung: clear   CV: S1 S2 , Murmur:  Abd: soft  Ext: No edema  neuro: Awake / alert  Psych: flat affect  Skin: normal``    LABS/DATA:    CARDIAC MARKERS:                                8.3    8.93  )-----------( 236      ( 22 Feb 2020 08:21 )             26.9     02-22    137  |  102  |  24<H>  ----------------------------<  164<H>  3.9   |  24  |  1.66<H>    Ca    8.8      22 Feb 2020 08:20  Phos  3.4     02-21  Mg     2.0     02-21      proBNP:   Lipid Profile:   HgA1c:   TSH:     TELE:  EKG: cane x 20 each with upper extremity's reaching max verbal cues'   Side R and L with 2 arms reaching with assist of PT cues to stomp for improved foot clearance contact guard assist x 20 patient is closing eyes and falling asleep   Not done retro due to patient needing to change activity due to somnolence  Retro R and L contact guard assist and cues for bigger steps and arms reaching with min to mod assist / modeling / max verbal cues' loss of balance R and L x 20 - patient needs repeated cueing for performance to step back with modeling.     Upper extremity assist for the following and intermittent min assist   Not done Weight shifting to promote movement of center of gravity  needed for gait safety, reaching,   L to R with cross body reach outside base of support  and rotation x 10 use of cone target stand by assist   R to L with cross body reach outside base of support and rotation x 10 use of cone target stand by assist     Staggered stance weight shifting with contact guard assist and assist for upper extremity reach R and L / loss of balance but better weight shift outside of base of support x 20 each with max assist       Gait Training  TUG repeated from low compliant surface with L and R turns to promote direction following / motor planning for sitting on low surfaces and acceleration, deceleration and gait safety on initiation of gait  x 10 with max verbal and contact guard assist cues to rise verbal     Gait over hurtles x 4 hurtles 10\" with varying r and L foot lead to promote foot clearance and motor planning for stepping over with 4x 90 degree turns to promote spatial awareness of environmental clearance for walls   Up/down 3 stairs with cues for foot clearance up/down x 3 over the gait around department x 4 x 150' /patient demonstrated fatigue at the end of the session.         ASSESSMENT                                                                                                            Patient  returns for PT follow up for gait and balance impairments with falls /Parkinson's Disease moderate stage with festinating gait   Treatment focused on mobility gait and balance as well as direction folllowing with upgrades to patient's cue sequencing and timing of cues with trials of cue withdrawal.   Patient still is having difficulty with motor planning, festination with turning to sit in a chair and with  reliance on cues verbal and tactile. He is imbalanced.  Patient is improving as evidenced by improvement on TUG, improvement in static tandem stance significantly from 0 seconds to 8 seconds L and 18 on the R with assist of upper extremity's to assume positioning. He is better able to line up with the chair prior to sitting without cueing. He is still having somnolence intermittently with treatment    Patient tolerated treatment well.         Education:   - Results of above outlined education: Verbalizes understanding and Needs reinforcement    PLAN                                                                                                                           Suggestions for next session as indicated: Progress per plan of care       Therapy procedure time and total treatment time can be found documented on the Time Entry flowsheet

## 2024-04-01 NOTE — ED ADULT NURSE NOTE - ED STAT RN HANDOFF DETAILS 3
Handoff given to oncoming RN Stephani. Plan of care reviewed, Pt concerns and pending orders endorsed.

## 2024-04-01 NOTE — ED ADULT NURSE NOTE - ED STAT RN HANDOFF DETAILS
Received handoff report from HAYDEE Redd. Pt on stretcher. Continuous cardiac and SpO2 monitoring in progress. IV site & patency inspected and integrity maintained. Universal fall precautions in place, side rails x2 raised,  socks provided, personal belongings within reach, oriented to call bell system. Visitor at bedside, plan of care reviewed and updates given. Pending LabR, RadR

## 2024-04-01 NOTE — ED ADULT NURSE NOTE - ED STAT RN HANDOFF DETAILS 2
Report given to RN Kristen for my break coverage; plan of care, pending labs / rads, and issues brought up by pt endorsed to covering RN.

## 2024-04-01 NOTE — ED ADULT NURSE NOTE - OBJECTIVE STATEMENT
pt alert non verbal daughter at bedside comes from home.  pt has live in aid.  daughter brought pt in via ems due to cold symptoms, productive cough, increased respiratory rate.

## 2024-04-01 NOTE — ED ADULT NURSE NOTE - NSFALLHARMRISKINTERV_ED_ALL_ED

## 2024-04-01 NOTE — ED ADULT TRIAGE NOTE - CHIEF COMPLAINT QUOTE
bibems from home for cough, cold and diff breathing today.  as per EMS, pt sat low 90's on RA, placed on NRB sat 99%.   hx of dementia, HTN, CHF, asthma.  allergy to vasotec

## 2024-04-02 NOTE — ED PROVIDER NOTE - OBJECTIVE STATEMENT
89 F HTN, COPD, DM, dementia presenting to the ED for shortness of breath. Patient's daughter noticed that she experienced a period of shortness of breath with sweating. Pt denies chest pain.

## 2024-04-02 NOTE — H&P ADULT - PROBLEM SELECTOR PLAN 3
hsTnT 451---> 988, CKMB normal  EKG  ( I personally review) with lateral TWI  Likely demand ischemia due to hypoxic/COPD exacerbation  Serial trop/CK/CKMB  aspirin, statin, BB  Check ECHO  Not a candidate for invasive medication intervention due to advanced age, advanced dementia and baseline functional status

## 2024-04-02 NOTE — ED ADULT NURSE REASSESSMENT NOTE - NS ED NURSE REASSESS COMMENT FT1
Patient remains on Bipap, Lasix re dosed. CM intact, labs resent. Daughter at bedside, patient changed and repositioned. Awaiting inpatient bed assignment.

## 2024-04-02 NOTE — H&P ADULT - NSHPPHYSICALEXAM_GEN_ALL_CORE
CONSTITUTIONAL: alert  but lethargy, no acute distress.  ENT: Mucosa moist, tongue normal.   NECK: Neck supple, trachea midline, non-tender  CARDIAC: Normal S1 and S2. Regular rate and rhythms. Trace Pedal edema  LUNGS: Equal air entry both lungs. Coarse breath sound, rales+. Increase respiratory effort.   ABDOMEN: Soft, nondistended, nontender. No guarding or rebound tenderness. No hepatomegaly or splenomegaly. Bowel sound normal.  MUSCULOSKELETAL: Normocephalic, atraumatic. No significant deformity or joint abnormality  NEUROLOGICAL: Move all extremities  SKIN: no lesions or eruptions. Normal turgor  PSYCHIATRIC: A&O x 0, demented

## 2024-04-02 NOTE — H&P ADULT - PROBLEM SELECTOR PLAN 5
A&O x 0 at baseline  continue donepezil  supportive care  Palliative care consult for GOC discussion

## 2024-04-02 NOTE — CONSULT NOTE ADULT - PROBLEM SELECTOR RECOMMENDATION 9
initially on NIV, now on NC   h/o COPD started on ceftriaxone, lactate and WBC elevated  no acute findings on CT although cardiomegaly noted, pending echo  duo nebs and symbicort initially on NIV, now on NC   h/o COPD started on ceftriaxone, lactate and WBC elevated  no acute findings on CT although cardiomegaly and dilated pulmonary artery noted, pending echo, suspect pHTN given COPD history and CT findings  duo nebs and Symbicort

## 2024-04-02 NOTE — ED ADULT NURSE REASSESSMENT NOTE - NS ED NURSE REASSESS COMMENT FT1
Dr mcdonald aware of HR in 120s, pt appears diaphoretic, and moving a lot, pulling on lines. Albuterol tx switched to levalbuterol per dr mcdonald. Will continue to monitor.

## 2024-04-02 NOTE — ED ADULT NURSE REASSESSMENT NOTE - NS ED NURSE REASSESS COMMENT FT1
Patient placed on 3L NC by RT. 12pm meds given and tolerated.   Report called to HAYDEE LOPEZ. Patient will be transferred to 1C on 3L NC, bipap to be set up at bedside by RT.

## 2024-04-02 NOTE — ED ADULT NURSE REASSESSMENT NOTE - NS ED NURSE REASSESS COMMENT FT1
Pt resting on stretcher. Safety maintained. Updates given. Monitoring continued. IV site & patency inspected and integrity maintained. Universal fall precautions in place, side rails x2 raised,  socks provided, personal belongings within reach, oriented to call bell system. Visitor at bedside, plan of care reviewed and updates given. Medications given as ordered, no adverse reactions noted.   pt tolerating bipap. Skin integrity, dry linen and clothing maintained. Hygiene supplies offered and provided. purewick maintained, pt noted to have sacral wound with no active bleeding

## 2024-04-02 NOTE — ED PROVIDER NOTE - CLINICAL SUMMARY MEDICAL DECISION MAKING FREE TEXT BOX
elderly female with multiple comorbidities presenting to the ED for shortness of breath.    concern for ACS, PNA, HF exacerbation  trop I elevated, ckmb and CK wnl/ + leukocytosis  ASA given; abx given for suspected PNA  EKG concerning for ischemia (unchanged from 2015)  pt w/ diffuse rales and increased work of breathing requiring BIPAP  will admit to tele for continued monitoring. holding heparin as we will continue to trend trop

## 2024-04-02 NOTE — CHART NOTE - NSCHARTNOTEFT_GEN_A_CORE
pt w/ acute respiratory failure 2/2 asthma exacerbation    CT PE a/p negative for PE or pna, no pulm edema  no cp, troponin elevation 450-->700s, though CKMB and CK near normal values, no cp likely trop elevation 2/2 work of breathing from asthma  duonebs Q4 standing for now  solumedrol 40 mg iv BID  EKG w/ mild TWI anterior leads NOT concerning for [place], low concern for ACS. No dissection or PE noted on imaging.  ordered for TTE      full H/P to follow after 8 am

## 2024-04-02 NOTE — H&P ADULT - NSHPADDITIONALINFOADULT_GEN_ALL_CORE
Discussed daughter Jessica Qucah regarding GOC. She seems not ready for discussion. She dose not have HCP copy with her. Patient has 7 childrens. Per daughter Jessica Quach, ? she is HCP but daughter Bibi at [place] make decision for patient. Palliative care consulted for GOC discussion.

## 2024-04-02 NOTE — CONSULT NOTE ADULT - PROBLEM SELECTOR RECOMMENDATION 3
mainly in bed, OOB to chair with max assist, full assist with eating  minimally conversant, able to answer simple questions   lives at home with her daughter, Jessica, has some HHA hours?  on seroquel at bedtime, patient lethargic, no behavioral issues noted mainly in bed, OOB to chair with max assist, full assist with eating  minimally conversant, able to answer simple questions   lives at home with her daughter, Jessica, has some HHA hours?  on Aricept and seroquel at bedtime, patient lethargic, no behavioral issues noted

## 2024-04-02 NOTE — CONSULT NOTE ADULT - NS ATTEND AMEND GEN_ALL_CORE FT
89  y F hx advanced dementia, min verbal, mainly BB, dependent for care,  COPD, DM, HTN, adm w acute hypoxic resp failure req BIPAP, currently transitioned to NC. Pt lethargic, NAD. On dysphagia diet. Has 7 children, 2 of the daughters seem to be primary decisionmakers, no HCP on file. Patient is hospice eligible however family hesitant to discuss GOC, wish to continue all measures at this time. Palliative team remains available as needed.

## 2024-04-02 NOTE — H&P ADULT - PROBLEM SELECTOR PROBLEM 1
normal... Well appearing, well nourished, awake, alert, oriented to person, place, time/situation and in no apparent distress. Acute respiratory failure with hypoxia

## 2024-04-02 NOTE — H&P ADULT - PROBLEM SELECTOR PLAN 4
WBC 18.94, patient is not able to provide history  CT chest  ( I personally review) with no focal consolidation  Check UA. urine culture, blood culture  Continue ceftriaxone pending clinical course  elevated lactate likely due to hypoxia/albuterol use/metformin use, need to R/O infectious etiology

## 2024-04-02 NOTE — H&P ADULT - ASSESSMENT
89 years old female with h/o HTN, HLD, COPD, hypothyroidism, DM, advanced dementia ( A&Ox0) was brought in to ED with complain of SOB. Per daughter, patient was noted to have SOB for around 1-2 days. Patient unable to provide history. Per triage note, patient was hypoxic to 90 at RA.   Tachypneic in ED, require BiPAP support. WBC 18.94, plt 302, Cr 1.48--> 1.05, hsTnT 451---> 988, CKMB normal, proBNP 2076, lactate 4.3--> 3.5, CT chest with no focal consolidation. Dilated pul artery suggests pul arterial hypertension. Cardiomegaly. CTA with no aortic dissection. No central PE. No acute findings in abd and pelvis.

## 2024-04-02 NOTE — ED ADULT NURSE REASSESSMENT NOTE - NS ED NURSE REASSESS COMMENT FT1
pt came back from CT scan, pt diaphoretic with increased WOB, tachypneic; lung sounds wet. Dr Steward aware. BiPap ordered and IV lasix ordered. Bladder and bowel elimination facilitated. purewick initiated.

## 2024-04-02 NOTE — CONSULT NOTE ADULT - ASSESSMENT
89 year old female PMH of dementia, COPD, DM, HTN, HLD presented to the ED for shortness of breath, was initially placed on NIV now on NC.  Trops and lactate elevated, started on abx.  Palliative care consulted for assistance with GOC.

## 2024-04-02 NOTE — ED ADULT NURSE REASSESSMENT NOTE - NSFALLHARMRISKINTERV_ED_ALL_ED

## 2024-04-02 NOTE — ED PROVIDER NOTE - PHYSICAL EXAMINATION
General: Well appearing elderly female in no acute distress  HEENT: Normocephalic, atraumatic. Moist mucous membranes. Oropharynx clear. No lymphadenopathy.  Eyes: No scleral icterus. EOMI. CAROLINE.  Neck:. Soft and supple. Full ROM without pain. No midline tenderness  Cardiac: Regular rate and regular rhythm. Peripheral pulses 2+ and symmetric. No LE edema.  Resp: Lungs with b/l rales, R upper lobe rales  Abd: Soft, non-tender, non-distended. No guarding or rebound.  Back: Spine midline and non-tender.   Skin: No rashes, abrasions, or lacerations.  Neuro: AO x 2. Moves all extremities symmetrically. Motor strength and sensation grossly intact.

## 2024-04-02 NOTE — CONSULT NOTE ADULT - CONVERSATION DETAILS
Spoke with patient's daughters, Jessica and her sister, Kate at bedside, patient lethargic, not able to participate in conversation.  Patient lives at home with Jessica, is dependent for help with all ADLs, OOB with max assist and pivot.  Able to answer some simple yes/no questions.  Said she is more lethargic than usual.  Patient has 7 children, typically Bibi and Jessica handle decision making, unclear if they have a formal HCP, asked if they have one, for it to be provided for the chart and explained [facility] surrogacy laws.  Patient had not made her wishes regarding LST such as CPR and intubation known and both daughters were reluctant to discuss that.  Explained that while they do not need to make any decisions, would be important to discuss these things as patient was on NIV prior which is the most amount of non-invasive oxygen one could be on, but if that failed or was contraindicated, the next step would be intubation and that would be important to clarify GOC prior to an emergency.  Patient's daughters did not want to discuss this.  Offered a family meeting as well to try and help with medical decision making.

## 2024-04-02 NOTE — H&P ADULT - PROBLEM SELECTOR PLAN 2
hypoxic to 90 at RA, tachypneic, require BiPAP support  CT chest  ( I personally review) with no focal consolidation. Dilated pul artery suggests pul arterial hypertension. Cardiomegaly. CTA with no aortic dissection. No central PE. No acute findings in abd and pelvis  Flu/COVID negative. Check full RVP  Likely due to COPD exacerbation, possible component of pul HTN  Received IV steroid, continue with prednisone 40mg daily x 5 days  duoneb q6hr  Give IV lasix 40mg x 1 and continue with oral lasix 40mg daily  Check ECHO to evalute for LVEF and pul HTN  Monitor respiratory status, wean off BiPAP as tolerate

## 2024-04-02 NOTE — H&P ADULT - PROBLEM SELECTOR PLAN 1
hypoxic to 90 at RA, tachypneic, require BiPAP support  CT chest  ( I personally review) with no focal consolidation. Dilated pul artery suggests pul arterial hypertension. Cardiomegaly. CTA with no aortic dissection. No central PE. No acute findings in abd and pelvis  Fluo/COVID negative  Likely due to COPD exacerbation, possible component of pul HTN  Received IV steroid, continue with prednisone 40mg daily x 5 days  duoneb q6hr  Give IV lasix 40mg x 1 and continue with oral lasix 40mg daily  Check ECHO to evaluate LVEF and pul HTN  monitor respiratory status, wean off BiPAP as tolerate   Palliative care consult for GOC discussion

## 2024-04-02 NOTE — CONSULT NOTE ADULT - SUBJECTIVE AND OBJECTIVE BOX
HPI:  Talked to daughter Jessica Quach ( [phone]) at bedside  89 years old female with h/o HTN, HLD, COPD, hypothyroidism, DM, advanced dementia ( A&Ox0) was brought in to ED with complain of SOB. Per daughter, patient was noted to have SOB for around 1-2 days. Patient unable to provide history. Per triage note, patient was hypoxic to 90 at RA.   Tachypneic in ED, require BiPAP support. WBC 18.94, plt 302, Cr 1.48--> 1.05, hsTnT 451---> 988, CKMB normal, proBNP 2076, lactate 4.3--> 3.5, CT chest with no focal consolidation. Dilated pul artery suggests pul arterial hypertension. Cardiomegaly. CTA with no aortic dissection. No central PE. No acute findings in abd and pelvis.    SH: no toxic habits (02 Apr 2024 11:52)    PERTINENT PM/SXH:   Hypertension    History of Hypothyroidism    Diabetes Mellitus Type II    Dementia    Depression    Chronic Obstructive Pulmonary Disease (COPD)    Chronic Diastolic Heart Failure      S/P total B/LKnee Replacement      FAMILY HISTORY:    ITEMS NOT CHECKED ARE NOT PRESENT    SOCIAL HISTORY:   Significant other/partner: no [ ]  Children: yes [ ]  Voodoo/Spirituality: Yazidism  Substance hx:  [ ]   Tobacco hx:  [ ]   Alcohol hx: [ ]   Home Opioid hx:  [ ] I-Stop Reference No:  Reference #: [id number]  Living Situation: [x ]Home  [ ]Long term care  [ ]Rehab [ ]Other    ADVANCE DIRECTIVES:    DNR  MOLST  [ ]  Living Will  [ ]   DECISION MAKER(s):  [ ] Health Care Proxy(s)  [ x] Surrogate(s)  [ ] Guardian           Name(s): Phone Number(s): Jessica Quach (daughter) [phone]    BASELINE (I)ADL(s) (prior to admission):  [place]: [ ]Total  [ ] Moderate [x ]Dependent    Allergies    Vasotec (Unknown)    Intolerances    MEDICATIONS  (STANDING):  albuterol/ipratropium for Nebulization 3 milliLiter(s) Nebulizer every 6 hours  atorvastatin 40 milliGRAM(s) Oral at bedtime  budesonide 160 MICROgram(s)/formoterol 4.5 MICROgram(s) Inhaler 2 Puff(s) Inhalation two times a day  cefTRIAXone   IVPB 1000 milliGRAM(s) IV Intermittent every 24 hours  cloNIDine 0.1 milliGRAM(s) Oral daily  cyanocobalamin 1000 MICROGram(s) Oral daily  dextrose 5%. 1000 milliLiter(s) (50 mL/Hr) IV Continuous <Continuous>  dextrose 5%. 1000 milliLiter(s) (100 mL/Hr) IV Continuous <Continuous>  dextrose 50% Injectable 12.5 Gram(s) IV Push once  dextrose 50% Injectable 25 Gram(s) IV Push once  dextrose 50% Injectable 25 Gram(s) IV Push once  donepezil 5 milliGRAM(s) Oral at bedtime  enoxaparin Injectable 30 milliGRAM(s) SubCutaneous every 24 hours  ferrous    sulfate 325 milliGRAM(s) Oral daily  glucagon  Injectable 1 milliGRAM(s) IntraMuscular once  insulin lispro (ADMELOG) corrective regimen sliding scale   SubCutaneous three times a day before meals  insulin lispro (ADMELOG) corrective regimen sliding scale   SubCutaneous at bedtime  levothyroxine 125 MICROGram(s) Oral daily  metoprolol succinate ER 50 milliGRAM(s) Oral daily  pantoprazole    Tablet 40 milliGRAM(s) Oral before breakfast  PARoxetine 40 milliGRAM(s) Oral daily  QUEtiapine 50 milliGRAM(s) Oral at bedtime    MEDICATIONS  (PRN):  acetaminophen     Tablet .. 650 milliGRAM(s) Oral every 6 hours PRN Mild Pain (1 - 3), Moderate Pain (4 - 6)  dextrose Oral Gel 15 Gram(s) Oral once PRN Blood Glucose LESS THAN 70 milliGRAM(s)/deciliter  melatonin 3 milliGRAM(s) Oral at bedtime PRN Insomnia    PRESENT SYMPTOMS: [ x]Unable to obtain due to poor mentation   Source if other than patient:  [ ]Family   [ ]Team     Pain: [ ] yes [ ] no  QOL impact -   Location -                    Aggravating factors -  Quality -  Radiation -  Timing-  Severity (0-10 scale):  Minimal acceptable level (0-10 scale):     PAIN AD Score: 0    [email or web address] (press ctrl +  left click to view)    Dyspnea:                           [ ]Mild [ ]Moderate [ ]Severe  Anxiety:                             [ ]Mild [ ]Moderate [ ]Severe  Fatigue:                             [ ]Mild [ ]Moderate [ ]Severe  Nausea:                             [ ]Mild [ ]Moderate [ ]Severe  Loss of appetite:              [ ]Mild [ ]Moderate [ ]Severe  Constipation:                    [ ]Mild [ ]Moderate [ ]Severe    Other Symptoms:  [ ]All other review of systems negative     Karnofsky Performance Score/Palliative Performance Status Version 2:         30%    http://npcrc.org/files/news/palliative_performance_scale_ppsv2.pdf  PHYSICAL EXAM:  Vital Signs Last 24 Hrs  T(C): 36.7 (02 Apr 2024 12:31), Max: 37.2 (02 Apr 2024 07:38)  T(F): 98 (02 Apr 2024 12:31), Max: 98.9 (02 Apr 2024 07:38)  HR: 78 (02 Apr 2024 12:55) (72 - 121)  BP: 141/67 (02 Apr 2024 12:55) (94/55 - 193/66)  BP(mean): --  RR: 21 (02 Apr 2024 12:55) (18 - 34)  SpO2: 98% (02 Apr 2024 12:55) (97% - 100%)    Parameters below as of 02 Apr 2024 12:55  Patient On (Oxygen Delivery Method): nasal cannula  O2 Flow (L/min): 4   I&O's Summary  GENERAL:  [ ]Alert  [ ]Oriented x   [ x]Lethargic  [ ]Cachexia  [ ]Unarousable  [ ]Verbal  [ ]Non-Verbal  Behavioral:   [ ] Anxiety  [ ] Delirium [ ] Agitation [ ] Other  HEENT:  [ ]Normal   [ ]Dry mouth   [ ]ET Tube/Trach  [ ]Oral lesions  PULMONARY:   [ ]Clear [ ]Tachypnea  [ ]Audible excessive secretions   [ ]Rhonchi        [ ]Right [ ]Left [ ]Bilateral  [ ]Crackles        [ ]Right [ ]Left [ ]Bilateral  [ ]Wheezing     [ ]Right [ ]Left [ ]Bilateral  diminished breath sounds bilaterally   CARDIOVASCULAR:    [ x]Regular [ ]Irregular [ ]Tachy  [ ]Dwain [ ]Murmur [ ]Other  GASTROINTESTINAL:  [x ]Soft  [ ]Distended   [ ]+BS  [ ]Non tender [ ]Tender  [ ]PEG [ ]OGT/ NGT  Last BM: prior to admission GENITOURINARY:  [ ]Normal [x ] Incontinent   [ ]Oliguria/Anuria   [ ]Stephen  MUSCULOSKELETAL:   [ ]Normal   [ ]Weakness  [x ]Bed/Wheelchair bound [ ]Edema  NEUROLOGIC:   [ ]No focal deficits  [ x] Cognitive impairment  [ ] Dysphagia [ ]Dysarthria [ ] Paresis [ ]Other   SKIN:   [ ]Normal   [ ]Pressure ulcer(s)  [ ]Rash    CRITICAL CARE:  [ ] Shock Present  [ ]Septic [ ]Cardiogenic [ ]Neurologic [ ]Hypovolemic  [ ]  Vasopressors [ ]  Inotropes   [ x] Respiratory failure present [ ] mechanical ventilation [x ] non-invasive ventilatory support [ ] High flow  [ ] Acute  [ ] Chronic [ ] Hypoxic  [ ] Hypercarbic [ ] Other  [ ] Other organ failure     LABS:                        9.9    17.65 )-----------( 265      ( 02 Apr 2024 10:03 )             31.3   04-02    143  |  110<H>  |  21  ----------------------------<  152<H>  3.5   |  20<L>  |  1.05    Ca    7.9<L>      02 Apr 2024 10:03  Phos  3.4     04-02  Mg     1.5     04-02    TPro  6.7  /  Alb  2.5<L>  /  TBili  0.1<L>  /  DBili  x   /  AST  24  /  ALT  13  /  AlkPhos  75  04-02    Urinalysis + Microscopic Examination (04.02.24 @ 12:20)    pH Urine: 7.0   Urine Appearance: Clear   Color: Yellow   Specific Gravity: 1.027   Protein, Urine: 300 mg/dL   Glucose Qualitative, Urine: 100 mg/dL   Ketone - Urine: Negative mg/dL   Blood, Urine: Trace   Bilirubin: Negative   Urobilinogen: 0.2 mg/dL   Leukocyte Esterase Concentration: Negative   Nitrite: Negative   White Blood Cell - Urine: 0 /HPF   Red Blood Cell - Urine: 1 /HPF   Bacteria: Few /HPF   Squamous Epithelial Cells: Present    Troponin I, High Sensitivity (04.01.24 @ 22:45)    Troponin I, High Sensitivity Result: 451.9: Serial measurements of high sensitivity troponin I (hs TnI) showing rapid  upward or downward changes suggest acute myocardial injury.  Please note  that a sustained elevation of hsTnI can be caused by renal disease,  chronic heart failure, sepsis, pulmonary embolism and other clinical  conditions. ng/L    Troponin I, High Sensitivity (04.02.24 @ 10:03)    Troponin I, High Sensitivity Result: 988.4: Serial measurements of high sensitivity troponin I (hs TnI) showing rapid  upward or downward changes suggest acute myocardial injury.  Please note  that a sustained elevation of hsTnI can be caused by renal disease,  chronic heart failure, sepsis, pulmonary embolism and other clinical  conditions. ng/L    RADIOLOGY & ADDITIONAL STUDIES:  < from: CT Angio Abdomen and Pelvis w/ IV Cont (04.02.24 @ 01:24) >  IMPRESSION:  No evidence of aortic dissection. No central pulmonary embolism.  Dilated main pulmonary artery suggests pulmonary arterial hypertension.  There is a bilateral streaky atelectasis.  No acute findings in the abdomen or pelvis.  --- End of Report ---  < end of copied text >    < from: CT Chest No Cont (04.02.24 @ 01:14) >  IMPRESSION:  Motion degraded study.  No acute findings in the chest.  Areas of bilateral linear atelectasis.  Dilated main pulmonary artery suggests pulmonary arterial hypertension.  Cardiomegaly.  -- End of Report ---  < end of copied text >    < from: Xray Chest 1 View AP/PA. (04.01.24 @ 22:42) >  IMPRESSION:  Left basilar subsegmental atelectasis. Otherwise clear lungs.  ---End of Report ---  < end of copied text >    PROTEIN CALORIE MALNUTRITION PRESENT: [ ] Yes [ ] No  [ ] PPSV2 < or = to 30% [ ] significant weight loss  [ ] poor nutritional intake [ ] catabolic state [ ] anasarca     Artificial Nutrition [ ]     REFERRALS:   [ ]Chaplaincy  [ ] Hospice  [ ]Child Life  [ ]Social Work  [ ]Case management [ ]Holistic Therapy     Goals of Care Document:

## 2024-04-02 NOTE — PATIENT PROFILE ADULT - FALL HARM RISK - HARM RISK INTERVENTIONS

## 2024-04-02 NOTE — CONSULT NOTE ADULT - PROBLEM SELECTOR RECOMMENDATION 5
See GOC above.  Patient with 7 children, Jessica and Bibi usually the main decision makers, no HCP on file.  Encouraged further discussion regarding GOC and family meeting offered.  Family did not want to make any decisions at present.  Palliative to follow.    Discussed with Dr. Harris

## 2024-04-02 NOTE — H&P ADULT - HISTORY OF PRESENT ILLNESS
Talked to daughter Jessica Quach ( [phone]) at bedside  89 years old female with h/o HTN, HLD, COPD, hypothyroidism, DM, advanced dementia ( A&Ox0) was brought in to ED with complain of SOB. Per daughter, patient was noted to have SOB for around 1-2 days. Patient unable to provide history. Per triage note, patient was hypoxic to 90 at RA.   Tachypneic in ED, require BiPAP support. WBC 18.94, plt 302, Cr 1.48--> 1.05, hsTnT 451---> 988, CKMB normal, proBNP 2076, lactate 4.3--> 3.5, CT chest with no focal consolidation. Dilated pul artery suggests pul arterial hypertension. Cardiomegaly. CTA with no aortic dissection. No central PE. No acute findings in abd and pelvis.    SH: no toxic habits

## 2024-04-02 NOTE — ED ADULT NURSE REASSESSMENT NOTE - NS ED NURSE REASSESS COMMENT FT1
Patient received on stretcher, daughters at bedside. Patient restless and attempting to pull bipap. Zyprexa 5mg IM given in R deltoid. CM intact, bipap intact 30% FiO2. +Course crackles audible, diminished breath sounds at bases. 600cc urine in suction canister. Family updated on plan of care, safety measures rendered. Awaiting inpatient bed assignment. Patient received on stretcher, daughters at bedside. Patient restless and attempting to pull bipap. Zyprexa 5mg IM given in R deltoid. CM intact, bipap intact 30% FiO2. +Course crackles audible, diminished breath sounds at bases. 600cc urine in suction canister. Healed sacral pressure ulcer 8X10cm noted and protective dressing applied. Family updated on plan of care, safety measures rendered. Awaiting inpatient bed assignment.

## 2024-04-02 NOTE — ED PROVIDER NOTE - CRITICAL CARE ATTENDING CONTRIBUTION TO CARE
elderly female with multiple comorbidities presenting to the ED for shortness of breath.    concern for ACS, PNA, HF exacerbation  trop I elevated, ckmb and CK wnl/ + leukocytosis  ASA given; abx given for suspected PNA  pt w/ diffuse rales and increased work of breathing requiring BIPAP (respiratory distress)  will admit to tele for continued monitoring. holding heparin as we will continue to trend trop

## 2024-04-03 NOTE — PROGRESS NOTE ADULT - SUBJECTIVE AND OBJECTIVE BOX
Patient seen and examined  off bipap  on 02  lethargic  Review of Systems:  UNABLE    Objective:  Vitals  reviewed    Physical Exam:  General: ELDERLY LETHARGIC  HEENT: Atraumatic, no LAD, trachea midline, PERRLA  Cardiovascular: normal s1s2, no murmurs, gallops or fricition rubs  Pulmonary: clear to ausculation Bilaterally, no wheezing , rhonchi  Gastrointestinal: soft non tender non distended, no masses felt, no organomegally  Muscloskeletal: no lower extremity edema, intact bilateral lower extremity pulses  Neurological: CN II-12 intact. No focal weakness AX01    Labs:                          9.3    11.63 )-----------( 268      ( 03 Apr 2024 06:05 )             29.1     04-03    142  |  105  |  35<H>  ----------------------------<  143<H>  3.6   |  26  |  2.14<H>    Ca    9.1      03 Apr 2024 06:05  Phos  4.0     04-03  Mg     2.0     04-03    TPro  7.5  /  Alb  2.8<L>  /  TBili  0.2  /  DBili  x   /  AST  25  /  ALT  16  /  AlkPhos  77  04-03    LIVER FUNCTIONS - ( 03 Apr 2024 06:05 )  Alb: 2.8 g/dL / Pro: 7.5 gm/dL / ALK PHOS: 77 U/L / ALT: 16 U/L / AST: 25 U/L / GGT: x                 Active Medications  MEDICATIONS  (STANDING):  albuterol/ipratropium for Nebulization 3 milliLiter(s) Nebulizer every 6 hours  aspirin  chewable 81 milliGRAM(s) Oral daily  atorvastatin 40 milliGRAM(s) Oral at bedtime  budesonide 160 MICROgram(s)/formoterol 4.5 MICROgram(s) Inhaler 2 Puff(s) Inhalation two times a day  cefTRIAXone   IVPB 1000 milliGRAM(s) IV Intermittent every 24 hours  cloNIDine 0.1 milliGRAM(s) Oral daily  cyanocobalamin 1000 MICROGram(s) Oral daily  dextrose 5%. 1000 milliLiter(s) (100 mL/Hr) IV Continuous <Continuous>  dextrose 5%. 1000 milliLiter(s) (50 mL/Hr) IV Continuous <Continuous>  dextrose 50% Injectable 25 Gram(s) IV Push once  dextrose 50% Injectable 12.5 Gram(s) IV Push once  dextrose 50% Injectable 25 Gram(s) IV Push once  donepezil 5 milliGRAM(s) Oral at bedtime  enoxaparin Injectable 30 milliGRAM(s) SubCutaneous every 24 hours  ferrous    sulfate 325 milliGRAM(s) Oral daily  furosemide    Tablet 40 milliGRAM(s) Oral daily  glucagon  Injectable 1 milliGRAM(s) IntraMuscular once  insulin glargine Injectable (LANTUS) 2 Unit(s) SubCutaneous at bedtime  insulin lispro (ADMELOG) corrective regimen sliding scale   SubCutaneous three times a day before meals  insulin lispro (ADMELOG) corrective regimen sliding scale   SubCutaneous at bedtime  levothyroxine 125 MICROGram(s) Oral daily  metoprolol succinate ER 50 milliGRAM(s) Oral daily  pantoprazole    Tablet 40 milliGRAM(s) Oral before breakfast  PARoxetine 40 milliGRAM(s) Oral daily  predniSONE   Tablet 40 milliGRAM(s) Oral daily  QUEtiapine 50 milliGRAM(s) Oral at bedtime    MEDICATIONS  (PRN):  acetaminophen     Tablet .. 650 milliGRAM(s) Oral every 6 hours PRN Mild Pain (1 - 3), Moderate Pain (4 - 6)  dextrose Oral Gel 15 Gram(s) Oral once PRN Blood Glucose LESS THAN 70 milliGRAM(s)/deciliter  melatonin 3 milliGRAM(s) Oral at bedtime PRN Insomnia

## 2024-04-04 NOTE — PROGRESS NOTE ADULT - SUBJECTIVE AND OBJECTIVE BOX
follow up on:  complex medical decision making related to goals of care      OVERNIGHT EVENTS:    Review of systems:     Pain:  [ ] yes [ ] no  QOL impact -   Location -                    Aggravating factors -  Quality -  Radiation -  Timing-  Severity (0-10 scale):  Minimal acceptable level (0-10 scale):     Dyspnea:      denies                        Anxiety:         denies                      Depression:   denies  Fatigue:      denies                         Nausea:      denies                         Loss of appetite:    denies            Constipation:     denies             Diarrhea:     denies    All other systems reviewed and negative  Unable to obtain/Limited due to poor mental status    MEDICATIONS  (STANDING):  albuterol/ipratropium for Nebulization 3 milliLiter(s) Nebulizer every 6 hours  aspirin  chewable 81 milliGRAM(s) Oral daily  atorvastatin 40 milliGRAM(s) Oral at bedtime  budesonide 160 MICROgram(s)/formoterol 4.5 MICROgram(s) Inhaler 2 Puff(s) Inhalation two times a day  cefTRIAXone   IVPB 1000 milliGRAM(s) IV Intermittent every 24 hours  cloNIDine 0.1 milliGRAM(s) Oral daily  cyanocobalamin 1000 MICROGram(s) Oral daily  dextrose 5%. 1000 milliLiter(s) (50 mL/Hr) IV Continuous <Continuous>  dextrose 5%. 1000 milliLiter(s) (100 mL/Hr) IV Continuous <Continuous>  dextrose 50% Injectable 12.5 Gram(s) IV Push once  dextrose 50% Injectable 25 Gram(s) IV Push once  dextrose 50% Injectable 25 Gram(s) IV Push once  donepezil 5 milliGRAM(s) Oral at bedtime  enoxaparin Injectable 30 milliGRAM(s) SubCutaneous every 24 hours  ferrous    sulfate 325 milliGRAM(s) Oral daily  furosemide    Tablet 40 milliGRAM(s) Oral daily  glucagon  Injectable 1 milliGRAM(s) IntraMuscular once  insulin glargine Injectable (LANTUS) 2 Unit(s) SubCutaneous at bedtime  insulin lispro (ADMELOG) corrective regimen sliding scale   SubCutaneous three times a day before meals  insulin lispro (ADMELOG) corrective regimen sliding scale   SubCutaneous at bedtime  levothyroxine 125 MICROGram(s) Oral daily  metoprolol succinate ER 50 milliGRAM(s) Oral daily  pantoprazole    Tablet 40 milliGRAM(s) Oral before breakfast  PARoxetine 40 milliGRAM(s) Oral daily  predniSONE   Tablet 40 milliGRAM(s) Oral daily  QUEtiapine 50 milliGRAM(s) Oral at bedtime    MEDICATIONS  (PRN):  acetaminophen     Tablet .. 650 milliGRAM(s) Oral every 6 hours PRN Mild Pain (1 - 3), Moderate Pain (4 - 6)  dextrose Oral Gel 15 Gram(s) Oral once PRN Blood Glucose LESS THAN 70 milliGRAM(s)/deciliter  melatonin 3 milliGRAM(s) Oral at bedtime PRN Insomnia      PHYSICAL EXAM:  Vital Signs Last 24 Hrs  T(C): 35.6 (04 Apr 2024 10:54), Max: 37 (03 Apr 2024 16:00)  T(F): 96 (04 Apr 2024 10:54), Max: 98.6 (03 Apr 2024 16:00)  HR: 63 (04 Apr 2024 10:54) (55 - 80)  BP: 171/82 (04 Apr 2024 10:54) (132/69 - 171/82)  BP(mean): --  RR: 17 (04 Apr 2024 10:54) (17 - 19)  SpO2: 93% (04 Apr 2024 10:54) (91% - 100%)    Parameters below as of 04 Apr 2024 10:54  Patient On (Oxygen Delivery Method): nasal cannula          Palliative Performance Scale/Karnofsky Score:  ECOG Performance:     GENERAL: alert, NAD  HEENT: Atraumatic, oropharynx clear, neck supple  CHEST/LUNG: unlabored  HEART: Regular rate and rhythm    ABDOMEN: Soft, Nontender, Nondistended, PEG, last BM  MUSCULOSKELETAL:  No  edema, ambulatory/bedbound  NERVOUS SYSTEM:  Alert & Oriented X3,  follows commands  SKIN: No rashes or lesions noted  Oral intake:    LABS:                          9.3    11.63 )-----------( 268      ( 03 Apr 2024 06:05 )             29.1     04-03    142  |  105  |  35<H>  ----------------------------<  143<H>  3.6   |  26  |  2.14<H>    Ca    9.1      03 Apr 2024 06:05  Phos  4.0     04-03  Mg     2.0     04-03    TPro  7.5  /  Alb  2.8<L>  /  TBili  0.2  /  DBili  x   /  AST  25  /  ALT  16  /  AlkPhos  77  04-03    Urinalysis Basic - ( 03 Apr 2024 06:05 )    Color: x / Appearance: x / SG: x / pH: x  Gluc: 143 mg/dL / Ketone: x  / Bili: x / Urobili: x   Blood: x / Protein: x / Nitrite: x   Leuk Esterase: x / RBC: x / WBC x   Sq Epi: x / Non Sq Epi: x / Bacteria: x        RADIOLOGY & ADDITIONAL STUDIES: follow up on:  complex medical decision making related to goals of care    OVERNIGHT EVENTS:  no events overnight   lethargic, in NAD     Review of systems:     Pain:  [ ] yes [x ] no  QOL impact -   Location -                    Aggravating factors -  Quality -  Radiation -  Timing-  Severity (0-10 scale):  Minimal acceptable level (0-10 scale):     Dyspnea:      denies                        Anxiety:         denies                      Depression:   denies  Fatigue:      present                         Nausea:      denies                         Loss of appetite:    denies            Constipation:     denies             Diarrhea:     denies    Unable to obtain/Limited due to poor mental status    MEDICATIONS  (STANDING):  albuterol/ipratropium for Nebulization 3 milliLiter(s) Nebulizer every 6 hours  aspirin  chewable 81 milliGRAM(s) Oral daily  atorvastatin 40 milliGRAM(s) Oral at bedtime  budesonide 160 MICROgram(s)/formoterol 4.5 MICROgram(s) Inhaler 2 Puff(s) Inhalation two times a day  cefTRIAXone   IVPB 1000 milliGRAM(s) IV Intermittent every 24 hours  cloNIDine 0.1 milliGRAM(s) Oral daily  cyanocobalamin 1000 MICROGram(s) Oral daily  dextrose 5%. 1000 milliLiter(s) (50 mL/Hr) IV Continuous <Continuous>  dextrose 5%. 1000 milliLiter(s) (100 mL/Hr) IV Continuous <Continuous>  dextrose 50% Injectable 12.5 Gram(s) IV Push once  dextrose 50% Injectable 25 Gram(s) IV Push once  dextrose 50% Injectable 25 Gram(s) IV Push once  donepezil 5 milliGRAM(s) Oral at bedtime  enoxaparin Injectable 30 milliGRAM(s) SubCutaneous every 24 hours  ferrous    sulfate 325 milliGRAM(s) Oral daily  furosemide    Tablet 40 milliGRAM(s) Oral daily  glucagon  Injectable 1 milliGRAM(s) IntraMuscular once  insulin glargine Injectable (LANTUS) 2 Unit(s) SubCutaneous at bedtime  insulin lispro (ADMELOG) corrective regimen sliding scale   SubCutaneous three times a day before meals  insulin lispro (ADMELOG) corrective regimen sliding scale   SubCutaneous at bedtime  levothyroxine 125 MICROGram(s) Oral daily  metoprolol succinate ER 50 milliGRAM(s) Oral daily  pantoprazole    Tablet 40 milliGRAM(s) Oral before breakfast  PARoxetine 40 milliGRAM(s) Oral daily  predniSONE   Tablet 40 milliGRAM(s) Oral daily  QUEtiapine 50 milliGRAM(s) Oral at bedtime    MEDICATIONS  (PRN):  acetaminophen     Tablet .. 650 milliGRAM(s) Oral every 6 hours PRN Mild Pain (1 - 3), Moderate Pain (4 - 6)  dextrose Oral Gel 15 Gram(s) Oral once PRN Blood Glucose LESS THAN 70 milliGRAM(s)/deciliter  melatonin 3 milliGRAM(s) Oral at bedtime PRN Insomnia      PHYSICAL EXAM:  Vital Signs Last 24 Hrs  T(C): 35.6 (04 Apr 2024 10:54), Max: 37 (03 Apr 2024 16:00)  T(F): 96 (04 Apr 2024 10:54), Max: 98.6 (03 Apr 2024 16:00)  HR: 63 (04 Apr 2024 10:54) (55 - 80)  BP: 171/82 (04 Apr 2024 10:54) (132/69 - 171/82)  BP(mean): --  RR: 17 (04 Apr 2024 10:54) (17 - 19)  SpO2: 93% (04 Apr 2024 10:54) (91% - 100%)    Parameters below as of 04 Apr 2024 10:54  Patient On (Oxygen Delivery Method): nasal cannula      Palliative Performance Scale/Karnofsky Score: 30%    GENERAL: elderly female  in bed,in NAD   HEENT:  Atraumatic, normocephalic, MM dry  NECK: Supple, No JVD  CHEST/LUNG: CTAB, no rhonchi, rales or wheezing   HEART: S1S2 RRR, nomurmur, rub or gallop  ABDOMEN: soft, NT, normoactive BS x 4  EXTREMITIES:  2+ Peripheral Pulses, brisk capillary refill. no edema or clubbing   NEUROLOGICAL: oriented x 0,, cannot  follow simple commands  MSK: Fno clubbing or edema  SKIN: Stage 2 sacral deubitus  PSYCH: calm and appropriate for situation   LYMPH: no lymphadenopathy     Oral intake: fair    LABS:                        9.3    11.63 )-----------( 268      ( 03 Apr 2024 06:05 )             29.1     04-03    142  |  105  |  35<H>  ----------------------------<  143<H>  3.6   |  26  |  2.14<H>    Ca    9.1      03 Apr 2024 06:05  Phos  4.0     04-03  Mg     2.0     04-03    TPro  7.5  /  Alb  2.8<L>  /  TBili  0.2  /  DBili  x   /  AST  25  /  ALT  16  /  AlkPhos  77  04-03    Urinalysis Basic - ( 03 Apr 2024 06:05 )    Color: x / Appearance: x / SG: x / pH: x  Gluc: 143 mg/dL / Ketone: x  / Bili: x / Urobili: x   Blood: x / Protein: x / Nitrite: x   Leuk Esterase: x / RBC: x / WBC x   Sq Epi: x / Non Sq Epi: x / Bacteria: x        RADIOLOGY & ADDITIONAL STUDIES: reviewed follow up on:  complex medical decision making related to goals of care    OVERNIGHT EVENTS:  no events overnight   lethargic, in NAD     Review of systems:     Pain:  [ ] yes [x ] no  QOL impact -   Location -                    Aggravating factors -  Quality -  Radiation -  Timing-  Severity (0-10 scale):  Minimal acceptable level (0-10 scale):     Dyspnea:      denies                        Anxiety:         denies                      Depression:   denies  Fatigue:      present                         Nausea:      denies                         Loss of appetite:    denies            Constipation:     denies             Diarrhea:     denies    Unable to obtain/Limited due to poor mental status    MEDICATIONS  (STANDING):  albuterol/ipratropium for Nebulization 3 milliLiter(s) Nebulizer every 6 hours  aspirin  chewable 81 milliGRAM(s) Oral daily  atorvastatin 40 milliGRAM(s) Oral at bedtime  budesonide 160 MICROgram(s)/formoterol 4.5 MICROgram(s) Inhaler 2 Puff(s) Inhalation two times a day  cefTRIAXone   IVPB 1000 milliGRAM(s) IV Intermittent every 24 hours  cloNIDine 0.1 milliGRAM(s) Oral daily  cyanocobalamin 1000 MICROGram(s) Oral daily  dextrose 5%. 1000 milliLiter(s) (50 mL/Hr) IV Continuous <Continuous>  dextrose 5%. 1000 milliLiter(s) (100 mL/Hr) IV Continuous <Continuous>  dextrose 50% Injectable 12.5 Gram(s) IV Push once  dextrose 50% Injectable 25 Gram(s) IV Push once  dextrose 50% Injectable 25 Gram(s) IV Push once  donepezil 5 milliGRAM(s) Oral at bedtime  enoxaparin Injectable 30 milliGRAM(s) SubCutaneous every 24 hours  ferrous    sulfate 325 milliGRAM(s) Oral daily  furosemide    Tablet 40 milliGRAM(s) Oral daily  glucagon  Injectable 1 milliGRAM(s) IntraMuscular once  insulin glargine Injectable (LANTUS) 2 Unit(s) SubCutaneous at bedtime  insulin lispro (ADMELOG) corrective regimen sliding scale   SubCutaneous three times a day before meals  insulin lispro (ADMELOG) corrective regimen sliding scale   SubCutaneous at bedtime  levothyroxine 125 MICROGram(s) Oral daily  metoprolol succinate ER 50 milliGRAM(s) Oral daily  pantoprazole    Tablet 40 milliGRAM(s) Oral before breakfast  PARoxetine 40 milliGRAM(s) Oral daily  predniSONE   Tablet 40 milliGRAM(s) Oral daily  QUEtiapine 50 milliGRAM(s) Oral at bedtime    MEDICATIONS  (PRN):  acetaminophen     Tablet .. 650 milliGRAM(s) Oral every 6 hours PRN Mild Pain (1 - 3), Moderate Pain (4 - 6)  dextrose Oral Gel 15 Gram(s) Oral once PRN Blood Glucose LESS THAN 70 milliGRAM(s)/deciliter  melatonin 3 milliGRAM(s) Oral at bedtime PRN Insomnia    PHYSICAL EXAM:  Vital Signs Last 24 Hrs  T(C): 35.6 (04 Apr 2024 10:54), Max: 37 (03 Apr 2024 16:00)  T(F): 96 (04 Apr 2024 10:54), Max: 98.6 (03 Apr 2024 16:00)  HR: 63 (04 Apr 2024 10:54) (55 - 80)  BP: 171/82 (04 Apr 2024 10:54) (132/69 - 171/82)  BP(mean): --  RR: 17 (04 Apr 2024 10:54) (17 - 19)  SpO2: 93% (04 Apr 2024 10:54) (91% - 100%)    Parameters below as of 04 Apr 2024 10:54  Patient On (Oxygen Delivery Method): nasal cannula      Palliative Performance Scale/Karnofsky Score: 30%    GENERAL: elderly female  in bed,in NAD   HEENT:  Atraumatic, normocephalic, MM dry  NECK: Supple, No JVD  CHEST/LUNG: CTAB, no rhonchi, rales or wheezing   HEART: S1S2 RRR, nomurmur, rub or gallop  ABDOMEN: soft, NT, normoactive BS x 4  EXTREMITIES:  2+ Peripheral Pulses, brisk capillary refill. no edema or clubbing   NEUROLOGICAL: oriented x 0,, cannot  follow simple commands  MSK: no clubbing or edema  SKIN: Stage 2 sacral decubitus ulcer  PSYCH: calm and appropriate for situation   LYMPH: no lymphadenopathy    Oral intake: fair    LABS:                        9.3    11.63 )-----------( 268      ( 03 Apr 2024 06:05 )             29.1     04-03    142  |  105  |  35<H>  ----------------------------<  143<H>  3.6   |  26  |  2.14<H>    Ca    9.1      03 Apr 2024 06:05  Phos  4.0     04-03  Mg     2.0     04-03    TPro  7.5  /  Alb  2.8<L>  /  TBili  0.2  /  DBili  x   /  AST  25  /  ALT  16  /  AlkPhos  77  04-03    Urinalysis Basic - ( 03 Apr 2024 06:05 )    Color: x / Appearance: x / SG: x / pH: x  Gluc: 143 mg/dL / Ketone: x  / Bili: x / Urobili: x   Blood: x / Protein: x / Nitrite: x   Leuk Esterase: x / RBC: x / WBC x   Sq Epi: x / Non Sq Epi: x / Bacteria: x        RADIOLOGY & ADDITIONAL STUDIES: reviewed

## 2024-04-04 NOTE — DIETITIAN INITIAL EVALUATION ADULT - PERTINENT MEDS FT
MEDICATIONS  (STANDING):  albuterol/ipratropium for Nebulization 3 milliLiter(s) Nebulizer every 6 hours  aspirin  chewable 81 milliGRAM(s) Oral daily  atorvastatin 40 milliGRAM(s) Oral at bedtime  budesonide 160 MICROgram(s)/formoterol 4.5 MICROgram(s) Inhaler 2 Puff(s) Inhalation two times a day  cefTRIAXone   IVPB 1000 milliGRAM(s) IV Intermittent every 24 hours  cloNIDine 0.1 milliGRAM(s) Oral daily  cyanocobalamin 1000 MICROGram(s) Oral daily  dextrose 5%. 1000 milliLiter(s) (100 mL/Hr) IV Continuous <Continuous>  dextrose 5%. 1000 milliLiter(s) (50 mL/Hr) IV Continuous <Continuous>  dextrose 50% Injectable 12.5 Gram(s) IV Push once  dextrose 50% Injectable 25 Gram(s) IV Push once  dextrose 50% Injectable 25 Gram(s) IV Push once  donepezil 5 milliGRAM(s) Oral at bedtime  enoxaparin Injectable 30 milliGRAM(s) SubCutaneous every 24 hours  ferrous    sulfate 325 milliGRAM(s) Oral daily  furosemide    Tablet 40 milliGRAM(s) Oral daily  glucagon  Injectable 1 milliGRAM(s) IntraMuscular once  insulin glargine Injectable (LANTUS) 2 Unit(s) SubCutaneous at bedtime  insulin lispro (ADMELOG) corrective regimen sliding scale   SubCutaneous three times a day before meals  insulin lispro (ADMELOG) corrective regimen sliding scale   SubCutaneous at bedtime  levothyroxine 125 MICROGram(s) Oral daily  metoprolol succinate ER 50 milliGRAM(s) Oral daily  pantoprazole    Tablet 40 milliGRAM(s) Oral before breakfast  PARoxetine 40 milliGRAM(s) Oral daily  predniSONE   Tablet 40 milliGRAM(s) Oral daily  QUEtiapine 50 milliGRAM(s) Oral at bedtime    MEDICATIONS  (PRN):  acetaminophen     Tablet .. 650 milliGRAM(s) Oral every 6 hours PRN Mild Pain (1 - 3), Moderate Pain (4 - 6)  dextrose Oral Gel 15 Gram(s) Oral once PRN Blood Glucose LESS THAN 70 milliGRAM(s)/deciliter  melatonin 3 milliGRAM(s) Oral at bedtime PRN Insomnia

## 2024-04-04 NOTE — DIETITIAN INITIAL EVALUATION ADULT - OTHER INFO
Pt seen on medical floor w/ Dementia ; respiratory failure ; COPD ; Acute respiratory failure w/ hypoxia; COPD exacerbation; leukocytosis; elevated trop; Chronic Heart Failure ; Debility. No family at bedside. Appears WDWN. Sacrum stage 2 ; unable to understand ( not related to language barrier). Left Pressure ulcer nutrition  therapy for pt's daughter Jessica. PTA pt had been living w/ her daughter Jessica.

## 2024-04-04 NOTE — DIETITIAN INITIAL EVALUATION ADULT - NS FNS DIET ORDER
Diet, Pureed:   Consistent Carbohydrate {No Snacks}  Moderately Thick Liquids (MODTHICKLIQS) (04-03-24 @ 21:06)

## 2024-04-04 NOTE — DIETITIAN INITIAL EVALUATION ADULT - PERTINENT LABORATORY DATA
04-03    142  |  105  |  35<H>  ----------------------------<  143<H>  3.6   |  26  |  2.14<H>    Ca    9.1      03 Apr 2024 06:05  Phos  4.0     04-03  Mg     2.0     04-03    TPro  7.5  /  Alb  2.8<L>  /  TBili  0.2  /  DBili  x   /  AST  25  /  ALT  16  /  AlkPhos  77  04-03  POCT Blood Glucose.: 145 mg/dL (04-04-24 @ 07:53)  A1C with Estimated Average Glucose Result: 7.2 % (04-03-24 @ 06:05)

## 2024-04-04 NOTE — PROGRESS NOTE ADULT - CONVERSATION DETAILS
Met at bedside with pt daughter Jessica Quach as follow up to discussion with Margaret Morgan yesterday. Reintroduced service and  Palliative medicines  team's role in assisting w complex decision making, communication and support.     Ms Quach appeared very guarded throughout discussion. She offered limited info despite s writer request to get to know her mother better to see if there are things that might help to enhance her mother's QOL. Pt was born in [place], [place], came to the [place] in her 40's. She has 7 children, lives with daughter Jessica. She is a retired HHA. Daughter again states that she and her sister Bibi  make final decisions about her mothers care but do discuss with the family before reaching these decisions.     Ms Quach states that at baseline,  pt is alert, knows her name and is able to have basic discussions with her family. Pt  has a regular daily routine for the past 10 years since her dementia dx which has changed little over the years. Pt can pivot OOB to chair, needs assist to feed but has a great appetite. Pt gets in the shower daily and assists with her ADL's, takes a short nap before lunch and watches TV all afternoon. She sleeps well throughout the night and is rarely agitated     Attempted to discuss  the risks and benefits of cardiopulmonary resuscitative measures including CPR, shock, pressors and invasive ventilation relating to artificial life support. Daughter acknowledges that such discussion with family have been addressed but is unwilling to share her thoughts surrounding such treatments. She states that she wishes for her mother to receive CPR and ventilatory support is she needed it. Attempted further discussion surrounding same DNI order being known as AND (Allow Natural Death) in other areas in the [place] which does not imply or be interpreted as withholding care that might help.     When asked what family is hoping for, Ms Quach responded that they want their mother to be comfortable. Spoke of trajectory of dementia, expected increase in asp PNA and UTI in last year of life. Frequent hospitalizations and changes in environment can be difficult as dementia progresses. Offered up hospice discussion to include philosophy and services offered. Ms Quach states that her mother does not need end of life services at this time. She states that she feels supported by Dr Allred and his staff and feels that her HHA are all she needs at this time.    Agreeable to further discussion in future if  family feels it is necessary    Discussed with Dr Prado and Eva WORRELL

## 2024-04-04 NOTE — PROGRESS NOTE ADULT - SUBJECTIVE AND OBJECTIVE BOX
Patient seen and examined  off bipap  on 02  lethargic  Review of Systems:  UNABLE    Objective:  Vitals  Vital Signs Last 24 Hrs  T(C): 35.6 (04 Apr 2024 10:54), Max: 37 (03 Apr 2024 16:00)  T(F): 96 (04 Apr 2024 10:54), Max: 98.6 (03 Apr 2024 16:00)  HR: 63 (04 Apr 2024 10:54) (55 - 80)  BP: 171/82 (04 Apr 2024 10:54) (132/69 - 171/82)  BP(mean): --  RR: 17 (04 Apr 2024 10:54) (17 - 19)  SpO2: 93% (04 Apr 2024 10:54) (91% - 100%)    Parameters below as of 04 Apr 2024 10:54  Patient On (Oxygen Delivery Method): nasal cannula        Physical Exam:  General: ELDERLY LETHARGIC  HEENT: Atraumatic, no LAD, trachea midline, PERRLA  Cardiovascular: normal s1s2, no murmurs, gallops or fricition rubs  Pulmonary: clear to ausculation Bilaterally, no wheezing , rhonchi  Gastrointestinal: soft non tender non distended, no masses felt, no organomegally  Muscloskeletal: no lower extremity edema, intact bilateral lower extremity pulses  Neurological: CN II-12 intact. No focal weakness AX01    Labs:                          9.3    11.63 )-----------( 268      ( 03 Apr 2024 06:05 )             29.1     04-03    142  |  105  |  35<H>  ----------------------------<  143<H>  3.6   |  26  |  2.14<H>    Ca    9.1      03 Apr 2024 06:05  Phos  4.0     04-03  Mg     2.0     04-03    TPro  7.5  /  Alb  2.8<L>  /  TBili  0.2  /  DBili  x   /  AST  25  /  ALT  16  /  AlkPhos  77  04-03    LIVER FUNCTIONS - ( 03 Apr 2024 06:05 )  Alb: 2.8 g/dL / Pro: 7.5 gm/dL / ALK PHOS: 77 U/L / ALT: 16 U/L / AST: 25 U/L / GGT: x                 Active Medications  MEDICATIONS  (STANDING):  albuterol/ipratropium for Nebulization 3 milliLiter(s) Nebulizer every 6 hours  aspirin  chewable 81 milliGRAM(s) Oral daily  atorvastatin 40 milliGRAM(s) Oral at bedtime  budesonide 160 MICROgram(s)/formoterol 4.5 MICROgram(s) Inhaler 2 Puff(s) Inhalation two times a day  cefTRIAXone   IVPB 1000 milliGRAM(s) IV Intermittent every 24 hours  cloNIDine 0.1 milliGRAM(s) Oral daily  cyanocobalamin 1000 MICROGram(s) Oral daily  dextrose 5%. 1000 milliLiter(s) (100 mL/Hr) IV Continuous <Continuous>  dextrose 5%. 1000 milliLiter(s) (50 mL/Hr) IV Continuous <Continuous>  dextrose 50% Injectable 25 Gram(s) IV Push once  dextrose 50% Injectable 12.5 Gram(s) IV Push once  dextrose 50% Injectable 25 Gram(s) IV Push once  donepezil 5 milliGRAM(s) Oral at bedtime  enoxaparin Injectable 30 milliGRAM(s) SubCutaneous every 24 hours  ferrous    sulfate 325 milliGRAM(s) Oral daily  furosemide    Tablet 40 milliGRAM(s) Oral daily  glucagon  Injectable 1 milliGRAM(s) IntraMuscular once  insulin glargine Injectable (LANTUS) 2 Unit(s) SubCutaneous at bedtime  insulin lispro (ADMELOG) corrective regimen sliding scale   SubCutaneous three times a day before meals  insulin lispro (ADMELOG) corrective regimen sliding scale   SubCutaneous at bedtime  levothyroxine 125 MICROGram(s) Oral daily  metoprolol succinate ER 50 milliGRAM(s) Oral daily  pantoprazole    Tablet 40 milliGRAM(s) Oral before breakfast  PARoxetine 40 milliGRAM(s) Oral daily  predniSONE   Tablet 40 milliGRAM(s) Oral daily  QUEtiapine 50 milliGRAM(s) Oral at bedtime    MEDICATIONS  (PRN):  acetaminophen     Tablet .. 650 milliGRAM(s) Oral every 6 hours PRN Mild Pain (1 - 3), Moderate Pain (4 - 6)  dextrose Oral Gel 15 Gram(s) Oral once PRN Blood Glucose LESS THAN 70 milliGRAM(s)/deciliter  melatonin 3 milliGRAM(s) Oral at bedtime PRN Insomnia

## 2024-04-05 NOTE — DISCHARGE NOTE PROVIDER - NSDCMRMEDTOKEN_GEN_ALL_CORE_FT
albuterol-ipratropium 2.5 mg-0.5 mg/3 mL inhalation solution: 3 milliliter(s) inhaled every 6 hours, As Needed  albuterol-ipratropium 2.5 mg-0.5 mg/3 mL inhalation solution: 3 milliliter(s) inhaled every 6 hours, As Needed  amLODIPine 5 mg oral tablet: 1 tab(s) orally once a day  aspirin 81 mg oral tablet, chewable: 1 tab(s) orally once a day  cefpodoxime 200 mg oral tablet: 1 tab(s) orally 2 times a day  cloNIDine 0.1 mg oral tablet: 1 tab(s) orally once a day  cyanocobalamin 1000 mcg oral tablet: 1 tab(s) orally once a day  ferrous sulfate 325 mg (65 mg elemental iron) oral tablet: 1 tab(s) orally once a day  fluticasone-salmeterol 250 mcg-50 mcg inhalation powder: 1 puff(s) inhaled 2 times a day  fluticasone-salmeterol 250 mcg-50 mcg inhalation powder: 1 puff(s) inhaled 2 times a day  furosemide 40 mg oral tablet: 1 tab(s) orally once a day  levothyroxine 125 mcg (0.125 mg) oral tablet: 1 tab(s) orally once a day  metFORMIN 500 mg oral tablet: 2 tabs orally in the morning  1 tab orally in the evening  metoprolol succinate 50 mg oral tablet, extended release: 1 tab(s) orally once a day  omeprazole 20 mg oral delayed release capsule: 2 cap(s) orally once a day  PARoxetine 40 mg oral tablet: 1 tab(s) orally once a day  predniSONE 10 mg oral tablet: 1 tab(s) orally once a day Taper dose as follows: 40mg x 2 days, 30mg x 2 days, 20mg x 2 days, 10mg x 2 days  QUEtiapine 100 mg oral tablet: 1 tab(s) orally once a day (at bedtime)  [employer] hospital bed with Group 2 pressure redistruction mattress:   simvastatin 80 mg oral tablet: 1 tab(s) orally once a day (at bedtime)  Transport Wheelchair: [facility]  ICD Z74.0  Ventolin HFA 90 mcg/inh inhalation aerosol: 2 puff(s) inhaled every 6 hours, As Needed  Ventolin HFA 90 mcg/inh inhalation aerosol: 2 puff(s) inhaled every 6 hours, As Needed

## 2024-04-05 NOTE — PROGRESS NOTE ADULT - PROBLEM SELECTOR PLAN 1
hypoxic to 90 at RA, tachypneic, require BiPAP support  CT chest  ( I personally review) with no focal consolidation. Dilated pul artery suggests pul arterial hypertension. Cardiomegaly. CTA with no aortic dissection. No central PE. No acute findings in abd and pelvis  Fluo/COVID negative  Likely due to COPD exacerbation, possible component of pul HTN  Received IV steroid, continue with prednisone 40mg daily x 5 days  duoneb q6hr  Give IV lasix 40mg x 1 and continue with oral lasix 40mg daily  Check ECHO to evaluate LVEF and pul HTN  monitor respiratory status, wean off BiPAP as tolerate   Palliative care consult for GOC discussion
hypoxic to 90 at RA, tachypneic, require BiPAP support  due to COPD and Diastolic Heart Failure  CT chest with no focal consolidation. Dilated pul artery suggests pul arterial hypertension. Cardiomegaly. CTA with no aortic dissection. No central PE. No acute findings in abd and pelvis  Flu/COVID negative  S/P lasix  on steroids  plan  Palliative care consulted for GOC discussion  family not ready to persue hospice
hypoxic to 90 at RA, tachypneic, require BiPAP support  due to COPD and Diastolic Heart Failure  CT chest with no focal consolidation. Dilated pul artery suggests pul arterial hypertension. Cardiomegaly. CTA with no aortic dissection. No central PE. No acute findings in abd and pelvis  Flu/COVID negative  S/P lasix  on steroids  plan  Palliative care consulted for GOC discussion  family not ready to persue hospice
hypoxic to 90 at RA, with tachypnea on admission   2/2  to COPD and A/C Diastolic Heart Failure  initially  requiring BiPAP support, now tolerating O2 NC @ 2 L with saturation of 93%  CT chest with no focal consolidation. Dilated pulmonary  artery. suspect pHTN  Cardiomegaly. CTA with no aortic dissection. No central PE. No acute findings in abd and pelvis  Flu/COVID negative  continue duo nebs and Symbicort.

## 2024-04-05 NOTE — PROGRESS NOTE ADULT - PROBLEM SELECTOR PLAN 4
daily wound care:     on puree with moderately thickened liquids  Per PCA on floor, pt tolerated ~ 50% of breakfast and lunch   Albumin 2.8, at high risk for protein malnutrition I setting of advanced dementia
WBC 18.94, patient is not able to provide history  CT chest  ( I personally review) with no focal consolidation  Check UA. urine culture, blood culture  Continue ceftriaxone pending clinical course  elevated lactate likely due to hypoxia/albuterol use/metformin use, need to R/O infectious etiology

## 2024-04-05 NOTE — PROGRESS NOTE ADULT - REASON FOR ADMISSION
acute respiratory failure with hypoxia, COPD exacerbation, leukocytosis, elevated trop

## 2024-04-05 NOTE — PHARMACOTHERAPY INTERVENTION NOTE - COMMENTS
Recommended switching protonix formulation to suspension based on pt's pureed diet. 
Recommended initiating insulin glargine based on pt's BG (Received ISS 6 units yesterday)

## 2024-04-05 NOTE — PROGRESS NOTE ADULT - PROBLEM SELECTOR PROBLEM 2
COPD exacerbation
Advanced dementia
COPD exacerbation
[Alert and Oriented x3] : oriented to person, place, and time
COPD exacerbation

## 2024-04-05 NOTE — DISCHARGE NOTE PROVIDER - HOSPITAL COURSE
89 years old female with h/o HTN, HLD, COPD, hypothyroidism, DM, advanced dementia ( A&Ox0) was brought in to ED with complain of SOB. Per daughter, patient was noted to have SOB for around 1-2 days. Patient unable to provide history. Per triage note, patient was hypoxic to 90 at RA.   Tachypneic in ED, require BiPAP support. WBC 18.94, plt 302, Cr 1.48--> 1.05, hsTnT 451---> 988, CKMB normal, proBNP 2076, lactate 4.3--> 3.5, CT chest with no focal consolidation. Dilated pul artery suggests pul arterial hypertension. Cardiomegaly. CTA with no aortic dissection. No central PE. No acute findings in abd and pelvis.      Acute respiratory failure with hypoxia.   ·  Plan: hypoxic to 90 at RA, tachypneic, require BiPAP support  due to COPD and Diastolic Heart Failure  CT chest with no focal consolidation. Dilated pul artery suggests pul arterial hypertension. Cardiomegaly. CTA with no aortic dissection. No central PE. No acute findings in abd and pelvis  Flu/COVID negative  S/P lasix  on steroids  plan  Palliative care consulted for GOC discussion  family not ready to persue hospice.  plan:  Discharge patient with lasix and steroid taper        Type II NStEMI  Likely demand ischemia due to hypoxic/COPD exacerbation  Serial trop/CK/CKMB  aspirin, statin, BB  Not a candidate for invasive medication intervention due to advanced age, advanced dementia and baseline functional status.    Leukocytosis due to UTI and chronic aspiration  CT chest  ( I personally review) with no focal consolidation  Ua and cultures are negative  Patient given 3 day course of antbiotics  Plan;  c/w vantin for 4 more days     Advanced dementia.   ·  Plan: A&O x 0 at baseline  continue donepezil  supportive care  Palliative care consult for GOC discussion.     Problem/Plan - 6:  ·  Problem: Benign essential HTN.   ·  Plan: monitor BP and titrate BP med.     Problem/Plan - 7:  ·  Problem: Hyperlipidemia, unspecified.   ·  Plan: on statin.     Problem/Plan - 8:  ·  Problem: Type 2 diabetes mellitus with unspecified complications.   ·  Plan: ISS, hypoglycemia protocol.     Problem/Plan - 9:  ·  Problem: Hypothyroidism, unspecified.   ·  Plan: continue synthroid.      Critical points on discharge:  Patient tapered to room air. Lethargic. end stage demented. Daughter refusing hospice, not ready to sign DNR/DNI.  extensive discussions had with patients daughter.    Will send patient home today. Patient is HIGH risk of rehospitalization.    Prognosis very poor

## 2024-04-05 NOTE — PROGRESS NOTE ADULT - PROBLEM SELECTOR PLAN 3
hsTnT 451---> 988, CKMB normal  EKG  ( I personally review) with lateral TWI  Likely demand ischemia due to hypoxic/COPD exacerbation  Serial trop/CK/CKMB  aspirin, statin, BB  Check ECHO  Not a candidate for invasive medication intervention due to advanced age, advanced dementia and baseline functional status
has 24/7 HA, requires   OOB to chair with max assist  requires full care with ADL's
hsTnT 451---> 988, CKMB normal  EKG  ( I personally review) with lateral TWI  Likely demand ischemia due to hypoxic/COPD exacerbation  Serial trop/CK/CKMB  aspirin, statin, BB  Check ECHO  Not a candidate for invasive medication intervention due to advanced age, advanced dementia and baseline functional status
hsTnT 451---> 988, CKMB normal  EKG  ( I personally review) with lateral TWI  Likely demand ischemia due to hypoxic/COPD exacerbation  Serial trop/CK/CKMB  aspirin, statin, BB  Check ECHO  Not a candidate for invasive medication intervention due to advanced age, advanced dementia and baseline functional status

## 2024-04-05 NOTE — PROGRESS NOTE ADULT - PROBLEM SELECTOR PLAN 2
as above
FAST Score 7C, PPSV2 30%  mainly bedbound,  OOB to chair with max assist, requires full assist to feed  minimally conversant, able to answer simple questions   lives at home with her daughter, Jessica  on Aricept and Seroquel at bedtime,  may consider D/C of Aricept as not shown to have effect in late statge dementia patients   High risk for delirium given advanced dementia     Delirium precautions: bedside window with blinds open; frequent re-orientation; pictures of family if possible; minimize lines, physical restraints, nighttime awakenings as medically feasible; ensure bowel movements daily or every other day, monitor for urinary retention; avoid anticholinergic medications or benzodiazepines as clinically feasible.
as above
hypoxic to 90 at RA, tachypneic, require BiPAP support  CT chest  ( I personally review) with no focal consolidation. Dilated pul artery suggests pul arterial hypertension. Cardiomegaly. CTA with no aortic dissection. No central PE. No acute findings in abd and pelvis  Flu/COVID negative. Check full RVP  Likely due to COPD exacerbation, possible component of pul HTN  Received IV steroid, continue with prednisone 40mg daily x 5 days  duoneb q6hr  Give IV lasix 40mg x 1 and continue with oral lasix 40mg daily  Check ECHO to evalute for LVEF and pul HTN  Monitor respiratory status, wean off BiPAP as tolerate

## 2024-04-05 NOTE — PROGRESS NOTE ADULT - PROBLEM SELECTOR PROBLEM 8
Type 2 diabetes mellitus with unspecified complications

## 2024-04-05 NOTE — DISCHARGE NOTE PROVIDER - NSDCDCMDCOMP_GEN_ALL_CORE
Chief Complaint   Patient presents with    6 Month Follow-Up     History of presenting illness:  Jadiel Mcfarland is a61 y.o. female who presents today for follow up on her chronic medical conditions as noted below. 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    Elevated transaminase level 11/07/2017    PFO (patent foramen ovale) 11/07/2017     Past Medical History:   Diagnosis Date    Acid reflux     Allergic rhinitis     Asthma     Asthma exacerbation 8/19/2017    Bronchitis     Chronic back pain     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  RINSE MOUTH AFTER USE      atorvastatin (LIPITOR) 10 MG tablet Take 1 tablet by mouth every day 90 tablet 1    metaxalone (SKELAXIN) 800 MG tablet TK 1/2 T PO Q 12 H PRN 30 tablet 2    acyclovir (ZOVIRAX) 800 MG tablet TK 1 T PO D 90 tablet 2    lisinopril-hydroCHLOROthiazide (PRINZIDE;ZESTORETIC) 10-12.5 MG per tablet Take 1 tablet by mouth daily 90 tablet 1    estradiol (ESTRACE) 1 MG tablet TAKE 1 TABLET BY MOUTH DAILY 90 tablet 3    cyclobenzaprine (FLEXERIL) 10 MG tablet Take 1 tablet by mouth nightly as needed for Muscle spasms 90 tablet 1    Cyanocobalamin (B-12) 5000 MCG SUBL Place under the tongue      aspirin 81 MG tablet Take 81 mg by mouth daily      Cetirizine HCl (ZYRTEC PO) Take 10 mg by mouth daily       Multiple Vitamins-Minerals (CERTA-MARY WITH MINERALS ORAL) solution Take 15 mLs by mouth daily      albuterol (PROVENTIL) (2.5 MG/3ML) 0.083% nebulizer solution Take 3 mLs by nebulization every 6 hours as needed for Wheezing (Patient not taking: Reported on 12/27/2021) 120 each 1    albuterol sulfate HFA (PROAIR HFA) 108 (90 Base) MCG/ACT inhaler Inhale 2 puffs into the lungs every 6 hours as needed for Wheezing (Patient not taking: Reported on 12/27/2021) 1 Inhaler 3    vitamin D (CHOLECALCIFEROL) 1000 UNIT TABS tablet Take 2,000 Units by mouth daily        No current facility-administered medications for this visit. Allergies   Allergen Reactions    Azithromycin Other (See Comments)     Chest pain/irruglar heat beat        Lortab [Hydrocodone-Acetaminophen]     Meperidine Other (See Comments)     unsure    Morphine Hives     Social History     Tobacco Use    Smoking status: Never Smoker    Smokeless tobacco: Never Used   Substance Use Topics    Alcohol use: No     Alcohol/week: 0.0 standard drinks      Family History   Problem Relation Age of Onset    Coronary Art Dis Mother     Diabetes Mother     High Blood Pressure Mother     Other Mother         thalassemia minor       Review of Systems   Constitutional: Positive for fatigue. Negative for chills and fever. HENT: Negative for congestion, ear pain, nosebleeds, postnasal drip and sore throat. Respiratory: Negative for cough, chest tightness and wheezing.     Cardiovascular: Negative for chest pain, palpitations and leg swelling. Gastrointestinal: Negative for abdominal pain and constipation. Genitourinary: Negative for dysuria and urgency. Musculoskeletal: Positive for arthralgias and back pain. Skin: Negative for rash. Neurological: Negative for dizziness and headaches. Psychiatric/Behavioral: Negative. Vitals:    06/20/22 0859   BP: 108/72   Site: Left Upper Arm   Position: Sitting   Cuff Size: Large Adult   Pulse: 78   Resp: 18   SpO2: 98%   Weight: 169 lb (76.7 kg)   Height: 5' 4.5\" (1.638 m)     Body mass index is 28.56 kg/m². Physical Exam  Constitutional:       Appearance: She is well-developed. HENT:      Right Ear: External ear normal.      Left Ear: External ear normal.      Mouth/Throat:      Pharynx: No oropharyngeal exudate. Eyes:      Conjunctiva/sclera: Conjunctivae normal.      Pupils: Pupils are equal, round, and reactive to light. Neck:      Thyroid: No thyromegaly. Vascular: No JVD. Cardiovascular:      Rate and Rhythm: Normal rate. Heart sounds: Normal heart sounds. No murmur heard. Pulmonary:      Effort: No respiratory distress. Breath sounds: Normal breath sounds. No wheezing or rales. Chest:      Chest wall: No tenderness. Abdominal:      General: Bowel sounds are normal.      Palpations: Abdomen is soft. Musculoskeletal:      Cervical back: Neck supple. Lymphadenopathy:      Cervical: No cervical adenopathy. Skin:     Findings: No rash.          Lab Review   Orders Only on 06/16/2022   Component Date Value    Vit D, 25-Hydroxy 06/16/2022 73.6     Sodium 06/16/2022 138     Potassium 06/16/2022 4.0     Chloride 06/16/2022 102     CO2 06/16/2022 27     Anion Gap 06/16/2022 9     Glucose 06/16/2022 82     BUN 06/16/2022 22*    CREATININE 06/16/2022 0.6     GFR Non- 06/16/2022 >60     GFR  06/16/2022 >59     Calcium 06/16/2022 9.5     Total Protein 06/16/2022 7.0     Albumin 06/16/2022 4.2     Total Bilirubin 06/16/2022 0.5     Alkaline Phosphatase 06/16/2022 75     ALT 06/16/2022 27     AST 06/16/2022 22     Cholesterol, Total 06/16/2022 194     Triglycerides 06/16/2022 80     HDL 06/16/2022 72     LDL Calculated 06/16/2022 106            ASSESSMENT/PLAN:      Essential hypertension-  recently gyn office started lisinopril/ hctz  Cont 10/12.5     IFG (impaired fasting glucose)  Continue good diet efforts,   A1c is now normal at 5.2 in 12/2021   FBS 81     Pure hypercholesterolemia-  I have personally reviewed and interpreted these lab results and thoroughly discussed with patient  LDL is 106 in 6/2022  Was [phone] in 12/2021 (88 in 6/2021)  RX Lipitor 10 mg daily  Healthy, mostly fiber rich nonstarchy plant-based diet recommended  Recommend to decrease intake of processed foods, simple carbohydrates and animal-based products that high in saturated fats        Vit D level 73 in 6/2022  Takes 2000 iu daily/ 4000 for wintertime     Previously E\elevated transaminase levels=  NOW NL      PFO/prior history of TIA. Pt follows dr Giovani Richards This Encounter   Procedures    Comprehensive Metabolic Panel    CBC with Auto Differential    Lipid Panel    Urinalysis    TSH    T4, Free    Vitamin D 25 Hydroxy     New Prescriptions    No medications on file         Return in about 6 months (around 12/20/2022) for Annual Physical.   There are no Patient Instructions on file for this visit. EMR Dragon/transcription disclaimer:Significant part of this  encounter note is electronic transcription/translationof spoken language to printed text. The electronic translation of spoken language may be erroneous, or at times, nonsensical words or phrases may be inadvertently transcribed.  Although I have reviewed the note for sucherrors, some may still exist. This document is complete and the patient is ready for discharge.

## 2024-04-05 NOTE — PROGRESS NOTE ADULT - SUBJECTIVE AND OBJECTIVE BOX
patient seen and examined  no events  tapered off 02  comfortable  Review of Systems:  unable  although alert today.     Objective:  Vitals  T(C): 36.4 (04-05-24 @ 10:30), Max: 36.6 (04-05-24 @ 00:08)  HR: 63 (04-05-24 @ 10:30) (63 - 75)  BP: 155/75 (04-05-24 @ 10:30) (155/75 - 174/74)  RR: 17 (04-05-24 @ 10:30) (17 - 18)  SpO2: 98% (04-05-24 @ 10:30) (94% - 100%)    Physical Exam:  General: comfortable, no acute distress  HEENT: Atraumatic, no LAD, trachea midline, PERRLA  Cardiovascular: normal s1s2, no murmurs, gallops or fricition rubs  Pulmonary: clear to ausculation Bilaterally, no wheezing , rhonchi  Gastrointestinal: soft non tender non distended, no masses felt, no organomegally  Muscloskeletal: no lower extremity edema, intact bilateral lower extremity pulses  Neurological: CN II-12 intact. No focal weakness  Psychiatrical: normal mood, cooperative  SKIN: no rash, lesions or ulcers    Labs:                    Active Medications  MEDICATIONS  (STANDING):  albuterol/ipratropium for Nebulization 3 milliLiter(s) Nebulizer every 6 hours  aspirin  chewable 81 milliGRAM(s) Oral daily  atorvastatin 40 milliGRAM(s) Oral at bedtime  budesonide 160 MICROgram(s)/formoterol 4.5 MICROgram(s) Inhaler 2 Puff(s) Inhalation two times a day  cefTRIAXone   IVPB 1000 milliGRAM(s) IV Intermittent every 24 hours  cloNIDine 0.1 milliGRAM(s) Oral daily  cyanocobalamin 1000 MICROGram(s) Oral daily  dextrose 5%. 1000 milliLiter(s) (100 mL/Hr) IV Continuous <Continuous>  dextrose 5%. 1000 milliLiter(s) (50 mL/Hr) IV Continuous <Continuous>  dextrose 50% Injectable 12.5 Gram(s) IV Push once  dextrose 50% Injectable 25 Gram(s) IV Push once  dextrose 50% Injectable 25 Gram(s) IV Push once  donepezil 5 milliGRAM(s) Oral at bedtime  enoxaparin Injectable 30 milliGRAM(s) SubCutaneous every 24 hours  ferrous    sulfate 325 milliGRAM(s) Oral daily  furosemide    Tablet 40 milliGRAM(s) Oral daily  glucagon  Injectable 1 milliGRAM(s) IntraMuscular once  insulin glargine Injectable (LANTUS) 2 Unit(s) SubCutaneous at bedtime  insulin lispro (ADMELOG) corrective regimen sliding scale   SubCutaneous three times a day before meals  insulin lispro (ADMELOG) corrective regimen sliding scale   SubCutaneous at bedtime  levothyroxine 125 MICROGram(s) Oral daily  metoprolol succinate ER 50 milliGRAM(s) Oral daily  PARoxetine 40 milliGRAM(s) Oral daily  polyethylene glycol 3350 17 Gram(s) Oral at bedtime  predniSONE   Tablet 40 milliGRAM(s) Oral daily  QUEtiapine 50 milliGRAM(s) Oral at bedtime    MEDICATIONS  (PRN):  acetaminophen     Tablet .. 650 milliGRAM(s) Oral every 6 hours PRN Mild Pain (1 - 3), Moderate Pain (4 - 6)  dextrose Oral Gel 15 Gram(s) Oral once PRN Blood Glucose LESS THAN 70 milliGRAM(s)/deciliter  melatonin 3 milliGRAM(s) Oral at bedtime PRN Insomnia

## 2024-04-06 NOTE — DISCHARGE NOTE NURSING/CASE MANAGEMENT/SOCIAL WORK - PATIENT PORTAL LINK FT
You can access the FollowMyHealth Patient Portal offered by [facility] by registering at the following website: [email or web address]. By joining [facility]’s FollowMyHealth portal, you will also be able to view your health information using other applications (apps) compatible with our system.

## 2024-05-03 NOTE — ED PROVIDER NOTE - CLINICAL SUMMARY MEDICAL DECISION MAKING FREE TEXT BOX
Patient is a 89-year-old female with history of COPD, DM, HTN, HLD and hypothyroidism and advanced dementia (A&Ox0) presents with shortness of breath and unresponsiveness. Brought in by EMS and accompanied by daughter Jessica. Daughter reports that patient was doing okay this morning, had breakfast and then had lunch, then found by daughter to have heavy breathing which progressively got worse. Daughter subsequently called EMS. Per EMS, patient was found to be minimally responsive. I evaluated patient in the ambulance bay and found patient to have decreasing HR and unable to detect a pulse or obtain a BP reading. Patient was immediately moved to Trauma Room C with initiation of ACLS. Total time of ACLS was 34 minutes, with 9x epi, 3x bicarb and 2x calcium given, FS was 246 at initial assessment. Patient was in PEA/asystole the whole time with no pulses detected. TOD was called at 1248. Updated patient's daughter Jessica and Kristine on the phone. ME (Cheryle Godinez) called and did not accept the case. Patient is a 89-year-old female with history of COPD, DM, HTN, HLD and hypothyroidism and advanced dementia (A&Ox0) presents with shortness of breath and unresponsiveness. Brought in by EMS and accompanied by daughter Jessica. Daughter reports that patient was doing okay this morning, had breakfast at 9 am, symptoms of heavy breathing around 11:30 am, which progressively got worse. Daughter subsequently called EMS. Per EMS, patient was found to be minimally responsive. I evaluated patient in the ambulance bay and found patient to have decreasing HR and unable to detect a pulse or obtain a BP reading. Patient was immediately moved to Trauma Room C with initiation of ACLS. Total time of ACLS was 36 minutes, with 8x epi, 3x bicarb and 2x calcium given, FS was 246 at initial assessment. Patient was in PEA/asystole the whole time with no pulses detected. TOD was called at 1248. Updated patient's daughter Jessica and Kristine on the phone. ME (Cheryle Godinez) called and did not accept the case.

## 2024-05-03 NOTE — ED PROVIDER NOTE - ATTENDING CONTRIBUTION TO CARE
I, Hipolito Maher MD, Emergency Medicine Attending Physician, personally saw and examined the patient and I personally made/approve the management plan and take responsibility for the patient management.    HPI: 89 female with history of HTN, HLD, COPD, hypothyroidism, DM, advanced dementia ( A&Ox0), who was brought in by EMS for shortness of breath and unresponsiveness.  Family states that patient was eating at 9 am, then 11:30 am the pt started to have difficulty breathing which progressively worsened. EMS states that the patient had a pulse but was minimally responsive which progressively worsened while en route and with bradycardia.  In ambulance triage, patient was noted to have progressive worsening bradycardia and unable to obtain BP, patient found to be pulseless and was brought to trauma room and ACLS initiated.     ROS: Unavailable    Exam:   GENERAL: pulseless and apneic, receiving assisted ventilation via LMA and chest compressions under my supervision.  HEENT: Pupils fixed and dilated.  LUNGS: Equal breath sounds bilaterally with assisted ventilation.  HEART: No audible heart sounds.  ABD: No obvious signs of trauma.  EXT: No palpable pulse.    MDM: Patient cardiac arrest, concern for respiratory arrest, may have had aspiration event.  Patient confirmed full code by EMS.  See ACLS flowsheet for full details.  Onset of code at 1212.  Patient received compressions manually and converted to [place].  FS 240s. Patient received medications by IO bilateral tibial initially, converted to right femoral central line.  Patient was given epinephrine x 8, bicarb x 3, calcium x 2.  L femoral arterial line placed, which showed no pulsatile flow or measurable blood pressure during 2 pulse checks.  Throughout cardiac arrest, patient was in PEA arrest and upon last pulse check, patient asystolic.  Patient pronounced dead at 12:48 PM.  Despite all therapy, the patient failed to develop a sustained perfusing rhythm. The patient was unresponsive, no spontaneous movement was observed. The patient did not respond to verbal or noxious stimuli. Physical exam reveals absent heart and breath sounds for more than one minute. The pupils are fixed and dilated, and corneal reflex was absent.   Discussed with ME office, Cheryle Godinez (forensic medical investigator), who also discussed w/ the daughter who witnessed the event, and deemed not accepted as ME case.   Discussed w/ family.     Critical Care Billing: cardiac arrest  Upon my evaluation, this patient had a high probability of imminent or life-threatening deterioration, which required my direct attention, intervention, and personal management.  The patient has a  medical condition that impairs one or more vital organ systems.  Frequent personal assessment and adjustment of medical interventions was performed.      I have personally provided 36 minutes of critical care time exclusive of time spent on separately billable procedures. Time includes review of laboratory data, radiology results, discussion with consultants, patient and family; monitoring for potential decompensation, as well as time spent retrieving data and reviewing the chart and documenting the visit. Interventions were performed as documented above.

## 2024-06-07 NOTE — DIETITIAN INITIAL EVALUATION ADULT. - NSPROEDAREADYLEARNOTH_GEN_A_NUR
Patient is a 89 year old female with PMH of asthma, HTN, pAfib, GERD, pulmonary and abdominal TB S/P resection with past chronic partial SBO, bronchiectasis, hypothyroidism, pancytopenia who presents with productive cough and fatigue for 1 week.   Rule out pneumonia     CT chest with chronic lung findings relatively unchanged, small pulmonary nodules, b/l atelectasis, bronchiectasis  afebrile, Tm 99.6F last night, pancytopenia noted  saturating well on RA, nontoxic appearing   PCT negative -- goes against bacterial process  COVID/RSV/Flu negative   started on ceftriaxone   low suspicion for bacterial pneumonia  Heme/Onc following for pancytopenia, patient prefers conservative treatment     Recommendations:   Follow up full RVP -- added on  Discontinued ceftriaxone   Supportive care   Stable from ID standpoint at this time.     Over the weekend Dr. Tiffanie Amador will be covering for our group. If you have any questions, concerns or new micro data, please reach out to them at [phone]     D/w Dr. Khris Polanco M.D.  [facility], Division of Infectious Diseases  [phone]  After 5pm on weekdays and all day on weekends - please call [phone] none

## 2024-10-29 NOTE — PROGRESS NOTE ADULT - SUBJECTIVE AND OBJECTIVE BOX
36.7 No pain or SOB. H Pleasantly confused.     Review of systems: Unable to obtain as pt is confused.       Review of systems: All 10 points ROS was obtained except as above.     ALBUTerol    90 MICROgram(s) HFA Inhaler 2 Puff(s) Inhalation every 6 hours PRN  amLODIPine   Tablet 10 milliGRAM(s) Oral daily  atorvastatin 40 milliGRAM(s) Oral at bedtime  budesonide 160 MICROgram(s)/formoterol 4.5 MICROgram(s) Inhaler 2 Puff(s) Inhalation two times a day  cefTRIAXone   IVPB      cefTRIAXone   IVPB 1000 milliGRAM(s) IV Intermittent every 24 hours  cloNIDine 0.1 milliGRAM(s) Oral daily  dextrose 40% Gel 15 Gram(s) Oral once PRN  dextrose 50% Injectable 12.5 Gram(s) IV Push once  dextrose 50% Injectable 25 Gram(s) IV Push once  dextrose 50% Injectable 25 Gram(s) IV Push once  donepezil 5 milliGRAM(s) Oral at bedtime  glucagon  Injectable 1 milliGRAM(s) IntraMuscular once PRN  insulin lispro (HumaLOG) corrective regimen sliding scale   SubCutaneous three times a day before meals  insulin lispro (HumaLOG) corrective regimen sliding scale   SubCutaneous at bedtime  levothyroxine 125 MICROGram(s) Oral daily  metoprolol succinate ER 50 milliGRAM(s) Oral daily  pantoprazole  Injectable 40 milliGRAM(s) IV Push daily  PARoxetine 30 milliGRAM(s) Oral daily  QUEtiapine 100 milliGRAM(s) Oral at bedtime      VITAL:  T(C): , Max: 36.7 (02-21-20 @ 04:30)  T(F): , Max: 98.1 (02-21-20 @ 04:30)  HR: 61 (02-22-20 @ 00:18)  BP: 163/84 (02-22-20 @ 00:18)  BP(mean): --  RR: 18 (02-22-20 @ 00:18)  SpO2: 99% (02-22-20 @ 00:18)  Wt(kg): --    02-20-20 @ 07:01  -  02-21-20 @ 07:00  --------------------------------------------------------  IN: 480 mL / OUT: 0 mL / NET: 480 mL        PHYSICAL EXAM:  General: NAD; Alert  HEENT:  NCAT; PERRLA  Neck: No JVD; supple  Respiratory: CTA-b/l  Cardiac: RRR s1s2  Gastrointestinal: BS+, soft, NT/ND  Urologic: No kim  Extremities: No peripheral edema      LABS:                          8.7    9.12  )-----------( 271      ( 21 Feb 2020 21:23 )             27.2     Na(139)/K(3.7)/Cl(103)/HCO3(24)/BUN(26)/Cr(1.36)Glu(160)/Ca(9.1)/Mg(2.0)/PO4(3.4)    02-21 @ 06:47  Na(138)/K(4.5)/Cl(105)/HCO3(20)/BUN(25)/Cr(1.44)Glu(149)/Ca(9.2)/Mg(--)/PO4(--)    02-20 @ 07:09  Na(140)/K(4.8)/Cl(105)/HCO3(22)/BUN(30)/Cr(1.65)Glu(169)/Ca(8.9)/Mg(2.0)/PO4(4.2)    02-19 @ 07:02    02-21    139  |  103  |  26<H>  ----------------------------<  160<H>  3.7   |  24  |  1.36<H>    Ca    9.1      21 Feb 2020 06:47  Phos  3.4     02-21  Mg     2.0     02-21

## 2024-10-30 NOTE — ED ADULT TRIAGE NOTE - HEIGHT IN CM
154.94 Significant Other Topical Metronidazole Counseling: Metronidazole is a topical antibiotic medication. You may experience burning, stinging, redness, or allergic reactions.  Please call our office if you develop any problems from using this medication.